# Patient Record
Sex: FEMALE | Race: WHITE | NOT HISPANIC OR LATINO | Employment: OTHER | URBAN - METROPOLITAN AREA
[De-identification: names, ages, dates, MRNs, and addresses within clinical notes are randomized per-mention and may not be internally consistent; named-entity substitution may affect disease eponyms.]

---

## 2017-01-09 RX ORDER — ROSUVASTATIN CALCIUM 20 MG/1
TABLET, COATED ORAL
Qty: 90 TABLET | Refills: 0 | Status: SHIPPED | OUTPATIENT
Start: 2017-01-09 | End: 2017-04-24

## 2017-01-14 DIAGNOSIS — Z12.39 BREAST CANCER SCREENING: ICD-10-CM

## 2017-03-20 ENCOUNTER — OFFICE VISIT (OUTPATIENT)
Dept: FAMILY MEDICINE CLINIC | Facility: CLINIC | Age: 72
End: 2017-03-20

## 2017-03-20 VITALS
DIASTOLIC BLOOD PRESSURE: 82 MMHG | OXYGEN SATURATION: 96 % | BODY MASS INDEX: 23.99 KG/M2 | HEIGHT: 65 IN | TEMPERATURE: 98.3 F | WEIGHT: 144 LBS | SYSTOLIC BLOOD PRESSURE: 138 MMHG | HEART RATE: 110 BPM

## 2017-03-20 DIAGNOSIS — N64.52 DISCHARGE FROM LEFT NIPPLE: Primary | ICD-10-CM

## 2017-03-20 PROCEDURE — 99213 OFFICE O/P EST LOW 20 MIN: CPT | Performed by: NURSE PRACTITIONER

## 2017-03-20 NOTE — PROGRESS NOTES
"Subjective   Gita Avelar is a 72 y.o. female who presents c/o blood draining from left breast x 3 days.   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 , as well as other biopsy. No breast cancer. Recent screening mammogram in January normal.   The following portions of the patient's history were reviewed and updated as appropriate: allergies, current medications, past family history, past medical history, past social history, past surgical history and problem list.    Review of Systems   Constitutional: Negative for activity change, chills, fever and unexpected weight change.   Skin:        Nipple retraction   Hematological: Negative for adenopathy.     Visit Vitals   • /82   • Pulse 110   • Temp 98.3 °F (36.8 °C) (Oral)   • Ht 65\" (165.1 cm)   • Wt 144 lb (65.3 kg)   • SpO2 96%   • BMI 23.96 kg/m2       Objective   Physical Exam   Pulmonary/Chest: She exhibits no mass and no tenderness. Left breast exhibits nipple discharge (serous). Left breast exhibits no inverted nipple, no mass, no skin change and no tenderness. Breasts are symmetrical.   Skin: Skin is warm and dry. No rash noted. No erythema. No pallor.   Psychiatric: She has a normal mood and affect. Her speech is normal and behavior is normal.     Assessment/Plan   Problems Addressed this Visit     None      Visit Diagnoses     Discharge from left nipple    -  Primary    Relevant Orders    US Breast Left Limited    Mammo diagnostic digital tomosynthesis left w CAD        Treatment pending results, consider breast surgery referral with history of papillomas.          "

## 2017-03-28 DIAGNOSIS — N64.52 DISCHARGE FROM LEFT NIPPLE: ICD-10-CM

## 2017-03-28 PROCEDURE — 76642 ULTRASOUND BREAST LIMITED: CPT | Performed by: NURSE PRACTITIONER

## 2017-03-28 PROCEDURE — 77061 BREAST TOMOSYNTHESIS UNI: CPT | Performed by: NURSE PRACTITIONER

## 2017-03-29 ENCOUNTER — TELEPHONE (OUTPATIENT)
Dept: FAMILY MEDICINE CLINIC | Facility: CLINIC | Age: 72
End: 2017-03-29

## 2017-03-29 DIAGNOSIS — D36.9 INTRADUCTAL PAPILLOMA: Primary | ICD-10-CM

## 2017-03-29 NOTE — TELEPHONE ENCOUNTER
----- Message from DARLENE Arriaga sent at 3/28/2017 11:32 AM EDT -----  Breast surgery referral for intraductal papilloma, can we set this up?

## 2017-04-04 ENCOUNTER — TELEPHONE (OUTPATIENT)
Dept: SURGERY | Facility: CLINIC | Age: 72
End: 2017-04-04

## 2017-04-04 NOTE — TELEPHONE ENCOUNTER
New patient appt to see Dr. Lieberman,   Possible bx, possible intraductal papilloma.   Scheduled 4/21/17 arrive 7;30am confirmed w/patient.     Pre-screened for possible bx-pt taking ASA and Aleve, will hold x 1 week prior to appt.   Patient taking Crestor, Vit C, Vit D3, Lexapro and Multivitamin. NKA.     lml

## 2017-04-06 ENCOUNTER — TELEPHONE (OUTPATIENT)
Dept: SURGERY | Facility: CLINIC | Age: 72
End: 2017-04-06

## 2017-04-06 NOTE — TELEPHONE ENCOUNTER
[facility], November 17, 2015: Bilateral screening mammogram.  Heterogenous a dense tissue.  Benign oval nodule upper outer right breast unchanged.  BI-RADS 2.

## 2017-04-10 RX ORDER — ESCITALOPRAM OXALATE 20 MG/1
TABLET ORAL
Qty: 90 TABLET | Refills: 0 | Status: SHIPPED | OUTPATIENT
Start: 2017-04-10 | End: 2017-04-24

## 2017-04-13 ENCOUNTER — PREP FOR SURGERY (OUTPATIENT)
Dept: SURGERY | Facility: CLINIC | Age: 72
End: 2017-04-13

## 2017-04-13 ENCOUNTER — OFFICE VISIT (OUTPATIENT)
Dept: SURGERY | Facility: CLINIC | Age: 72
End: 2017-04-13

## 2017-04-13 VITALS
DIASTOLIC BLOOD PRESSURE: 83 MMHG | SYSTOLIC BLOOD PRESSURE: 122 MMHG | TEMPERATURE: 97.9 F | HEART RATE: 80 BPM | OXYGEN SATURATION: 96 % | BODY MASS INDEX: 23.99 KG/M2 | HEIGHT: 65 IN | WEIGHT: 144 LBS

## 2017-04-13 DIAGNOSIS — Z80.0 FAMILY HISTORY OF MALIGNANT NEOPLASM OF PANCREAS: ICD-10-CM

## 2017-04-13 DIAGNOSIS — N63.0 LUMP OR MASS IN BREAST: ICD-10-CM

## 2017-04-13 DIAGNOSIS — R92.8 ABNORMAL ULTRASOUND OF BREAST: ICD-10-CM

## 2017-04-13 DIAGNOSIS — N64.52 NIPPLE DISCHARGE: Primary | ICD-10-CM

## 2017-04-13 PROCEDURE — 99203 OFFICE O/P NEW LOW 30 MIN: CPT | Performed by: SURGERY

## 2017-04-13 RX ORDER — SODIUM CHLORIDE, SODIUM LACTATE, POTASSIUM CHLORIDE, CALCIUM CHLORIDE 600; 310; 30; 20 MG/100ML; MG/100ML; MG/100ML; MG/100ML
100 INJECTION, SOLUTION INTRAVENOUS CONTINUOUS
Status: CANCELLED | OUTPATIENT
Start: 2017-04-13

## 2017-04-13 RX ORDER — CEFAZOLIN SODIUM 2 G/100ML
2 INJECTION, SOLUTION INTRAVENOUS ONCE
Status: CANCELLED | OUTPATIENT
Start: 2017-04-13 | End: 2017-04-13

## 2017-04-13 NOTE — PROGRESS NOTES
Chief Complaint: iGta Avelar is a 72 y.o. female who was seen in consultation at the request of DARLENE Arriaga  for {ARHCHIEFCOMPLAINT:21209}    History of Present Illness:  Patient presents with {ARHCHIEFCOMPLAINT:21209}. She noted no new masses, skin changes, nipple discharge, nipple changes prior to her most recent imaging.  Her most recent imaging includes the following: ***  She had a biopsy on the following day that showed: ***  She has not had a breast biopsy in the past.  She has her uterus and ovaries, is postmenopausal, and takes nor hormones.  Her family history includes the following: ***  She is here for evaluation.    Review of Systems:  Review of Systems - Oncology     Past Medical and Surgical History:  Breast Biopsy History:  {ARHPMHBREASTBIOPSY:21210}  Breast Cancer HIstory:  {ARHPMHBREAST CANCER:21211}  Breast Operations, and year:  ***  Obstetric/Gynecologic History:  Age menstrual periods began:***  {ARHPMHMENOPAUSALSTATUS:21212}   Number of pregnancies:***  Number of live births: ***  Number of abortions or miscarriages: ***  Age of delivery of first child: ***  {ARHPMHBREASTFEED:21213}  Length of time taking birth control pills:***  {ARHPMHHRT:21214}    ***  Past Surgical History:   Procedure Laterality Date   • BREAST BIOPSY     • HYSTERECTOMY         ***  Past Medical History:   Diagnosis Date   • Abnormal weight gain    • B12 deficiency    • Depression    • Diverticulitis of colon    • Elevated blood pressure    • History of Guillain-Luther syndrome    • History of subdural hematoma    • Hyperlipidemia    • Osteoporosis    • Postmenopausal atrophic vaginitis    • Vitamin D deficiency        Prior Hospitalizations, other than for surgery or childbirth, and year:  ***    ***  Social History     Social History   • Marital status:      Spouse name: N/A   • Number of children: N/A   • Years of education: N/A     Occupational History   • Not on file.     Social History Main  "Topics   • Smoking status: Current Every Day Smoker     Packs/day: 0.50     Years: 25.00     Types: Cigarettes     Start date: 1992   • Smokeless tobacco: Not on file   • Alcohol use Yes      Comment: occasionally    • Drug use: No   • Sexual activity: Not on file     Other Topics Concern   • Not on file     Social History Narrative     {ARHSOCHXMARITAL:21215}  {ARHSOCHXOCCUPATION:21217}  {ARHSOCHXCAFFEINE:21221}    ***  Family History:  Family History   Problem Relation Age of Onset   • Hypertension Mother    • Pancreatic cancer Mother    • Hypertension Father    • Deep vein thrombosis Father    • Hyperlipidemia Father    • Pancreatic cancer Sister    • Arthritis Sister    • Liver cancer Sister    • Rectal cancer Sister    • Colon cancer Sister    • Pancreatic cancer Brother    • Liver cancer Brother    • Prostate cancer Brother        Vital Signs:  /83 (BP Location: Left arm, Patient Position: Sitting, Cuff Size: Adult)  Pulse 80  Temp 97.9 °F (36.6 °C) (Temporal Artery )   Ht 65\" (165.1 cm)  Wt 144 lb (65.3 kg)  SpO2 96%  BMI 23.96 kg/m2     Medications:    Current Outpatient Prescriptions:   •  cholecalciferol (VITAMIN D3) 1000 UNITS tablet, Take 1,000 Units by mouth Daily., Disp: , Rfl:   •  escitalopram (LEXAPRO) 20 MG tablet, TAKE ONE TABLET BY MOUTH ONCE DAILY, Disp: 90 tablet, Rfl: 0  •  Multiple Vitamin (MULTIVITAMINS PO), Take 1 tablet by mouth daily., Disp: , Rfl:   •  naproxen sodium (ALEVE) 220 MG tablet, Take 220 mg by mouth every 12 (twelve) hours as needed., Disp: , Rfl:   •  Probiotic Product (PROBIOTIC DAILY PO), Take 1 tablet by mouth daily., Disp: , Rfl:   •  rosuvastatin (CRESTOR) 20 MG tablet, TAKE ONE TABLET BY MOUTH AT BEDTIME, Disp: 90 tablet, Rfl: 0  •  aspirin 81 MG tablet, Take 81 mg by mouth daily., Disp: , Rfl:      Allergies:  No Known Allergies    Physical Examination:  /83 (BP Location: Left arm, Patient Position: Sitting, Cuff Size: Adult)  Pulse 80  Temp 97.9 " "°F (36.6 °C) (Temporal Artery )   Ht 65\" (165.1 cm)  Wt 144 lb (65.3 kg)  SpO2 96%  BMI 23.96 kg/m2  General Appearance:  Patient is in no distress.  She is well kept and has an {ARHPEBUILD:72109} build.   Psychiatric:  Patient with appropriate mood and affect. Alert and oriented to self, time, and place.    Breast, RIGHT:  {ARHPEBREASTSIZE:46762} sized, symmetric with the contralateral side.  Breast skin is without erythema, edema, rashes.  There are no visible abnormalities upon inspection during the arm-raising maneuver or with hands on hips in the sitting position. There is no nipple retraction, discharge or nipple/areolar skin changes.There are no masses palpable in the sitting or supine positions.    Breast, LEFT:  {ARHPEBREASTSIZE:23438} sized, symmetric with the contralateral side.  Breast skin is without erythema, edema, rashes.  There are no visible abnormalities upon inspection during the arm-raising maneuver or with hands on hips in the sitting position. There is no nipple retraction, discharge or nipple/areolar skin changes.There are no masses palpable in the sitting or supine positions.    Lymphatic:  There is no axillary, cervical, infraclavicular, or supraclavicular adenopathy bilaterally.  Eyes:  Pupils are round and reactive to light.  Cardiovascular:  Heart rate and rhythm are regular.  Respiratory:  Lungs are clear bilaterally with no crackles or wheezes in any lung field.  Gastrointestinal:  Abdomen is soft, nondistended, and nontender. ***There are no scars from previous surgery.    Musculoskeletal:  Good strength in all 4 extremities.   {ARHPE range motion shoulder:30166}    Skin:  No new skin lesions or rashes on the skin excluding the breast (see breast exam above).        Imaging:  ***    Pathology:  ***    Procedures:      Assessment:  No diagnosis found.    Plan:  ***      Sarah Becerril MA        We have spent *** minutes in visit today, *** in face to face .      Next " Appointment:  No Follow-up on file.      EMR Dragon/transcription disclaimer:    Much of this encounter note is an electronic transcription/translocation of spoken language to printed text.  The electronic translation of spoken language may permit erroneous, or at times, nonsensical words or phrases to be inadvertently transcribed.  Although I have reviewed the note from such areas, some may still exist.

## 2017-04-13 NOTE — PROGRESS NOTES
Chief Complaint: Gita Avelar is a 72 y.o. female who was seen in consultation at the request of DARLENE Arriaga  for breast mass    History of Present Illness:  Patient presents with abnormal breast imaging and nipple discharge.     She noted in March spontaneous bloody nipple drainage from the LEFT breast on her shirt.    She noted no new masses, skin changes, other nipple changes prior to her most recent imaging.  Her most recent imaging includes the followin17 [facility] screening mammogram scattered fiber glandular densities.  BI-RADS 1.  3- [facility] diagnostic mammogram and ultrasound for new left bloody nipple discharge showed scattered fiber glandular densities.  Ultrasound showed a small apparently intraductal nodule 4 mm likely intraductal papilloma.  BI-RADS 4 surgical consultation is recommended for possible excision.    She has had 2 excisional biopsies on each breast for done in the .  She tells me that the excisions near the nipple in the past were also for her nipple drainage.  She has her ovaries, is postmenopausal, and takes no hormones.  Her family history includes the following: No daughters, 2 sisters, 5 maternal aunts, 6 paternal aunts.  No breast or ovarian cancer in her family.  She is here for evaluation.    Review of Systems:  Review of Systems   Gastrointestinal: Positive for constipation.   All other systems reviewed and are negative.       Past Medical and Surgical History:  Breast Biopsy History:  Patient has had the following breast biopsies: & , right and left breast biopsies x 4, all benign   Breast Cancer HIstory:  Patient does not have a past medical history of breast cancer.  Breast Operations, and year:  None   Obstetric/Gynecologic History:  Age menstrual periods began: 12 yrs old   Patient is postmenopausal due to removal of her uterus in the following year:    Number of pregnancies: 4  Number of live births:  3  Number of abortions or miscarriages: 1  Age of delivery of first child: 16  Patient did not breast feed.  Length of time taking birth control pills: 6 yrs   Patient has never taken hormone replacement  Patient has uterus removed in 1972, still has both ovaries.     Past Surgical History:   Procedure Laterality Date   • BREAST BIOPSY     • HYSTERECTOMY         Past Medical History:   Diagnosis Date   • Abnormal weight gain    • B12 deficiency    • Depression    • Diverticulitis of colon    • Elevated blood pressure    • History of Guillain-Durham syndrome    • History of subdural hematoma    • Hyperlipidemia    • Osteoporosis    • Postmenopausal atrophic vaginitis    • Vitamin D deficiency        Prior Hospitalizations, other than for surgery or childbirth, and year:  Diverticulitis & Subdural hematoma    Social History     Social History   • Marital status:      Spouse name: N/A   • Number of children: N/A   • Years of education: N/A     Occupational History   • Not on file.     Social History Main Topics   • Smoking status: Current Every Day Smoker     Packs/day: 0.50     Years: 25.00     Types: Cigarettes     Start date: 1992   • Smokeless tobacco: Not on file   • Alcohol use Yes      Comment: occasionally    • Drug use: No   • Sexual activity: Not on file     Other Topics Concern   • Not on file     Social History Narrative     Patient is .  Patient is retired.  Patient drinks 4 servings of caffeine per day.    Family History:  Family History   Problem Relation Age of Onset   • Hypertension Mother    • Pancreatic cancer Mother    • Hypertension Father    • Deep vein thrombosis Father    • Hyperlipidemia Father    • Pancreatic cancer Sister    • Arthritis Sister    • Liver cancer Sister    • Rectal cancer Sister    • Colon cancer Sister    • Pancreatic cancer Brother    • Liver cancer Brother    • Prostate cancer Brother        Vital Signs:  /83 (BP Location: Left arm, Patient Position:  "Sitting, Cuff Size: Adult)  Pulse 80  Temp 97.9 °F (36.6 °C) (Temporal Artery )   Ht 65\" (165.1 cm)  Wt 144 lb (65.3 kg)  SpO2 96%  BMI 23.96 kg/m2     Medications:    Current Outpatient Prescriptions:   •  cholecalciferol (VITAMIN D3) 1000 UNITS tablet, Take 1,000 Units by mouth Daily., Disp: , Rfl:   •  escitalopram (LEXAPRO) 20 MG tablet, TAKE ONE TABLET BY MOUTH ONCE DAILY, Disp: 90 tablet, Rfl: 0  •  Multiple Vitamin (MULTIVITAMINS PO), Take 1 tablet by mouth daily., Disp: , Rfl:   •  naproxen sodium (ALEVE) 220 MG tablet, Take 220 mg by mouth every 12 (twelve) hours as needed., Disp: , Rfl:   •  Probiotic Product (PROBIOTIC DAILY PO), Take 1 tablet by mouth daily., Disp: , Rfl:   •  rosuvastatin (CRESTOR) 20 MG tablet, TAKE ONE TABLET BY MOUTH AT BEDTIME, Disp: 90 tablet, Rfl: 0  •  aspirin 81 MG tablet, Take 81 mg by mouth daily., Disp: , Rfl:      Allergies:  No Known Allergies    Physical Examination:  /83 (BP Location: Left arm, Patient Position: Sitting, Cuff Size: Adult)  Pulse 80  Temp 97.9 °F (36.6 °C) (Temporal Artery )   Ht 65\" (165.1 cm)  Wt 144 lb (65.3 kg)  SpO2 96%  BMI 23.96 kg/m2  General Appearance:  Patient is in no distress.  She is well kept and has an average build.   Psychiatric:  Patient with appropriate mood and affect. Alert and oriented to self, time, and place.    Breast, RIGHT:  medium sized, symmetric with the contralateral side. There are well healed periareolar and upper outer quadrant lateral incisions on each breast from excisional biopsies in the 1970s.     Breast skin is without erythema, edema, rashes.  There are no visible abnormalities upon inspection during the arm-raising maneuver or with hands on hips in the sitting position. There is no nipple retraction, discharge or nipple/areolar skin changes.There are no  masses palpable in the sitting or supine positions.    Breast, LEFT:  medium sized, symmetric with the contralateral side.  There are well " healed periareolar and upper outer quadrant lateral incisions on each breast from excisional biopsies in the 1970s.  There is a palpable approximately 1.5 centimeter mass immediately adjacent to the skin at the left breast 9:30 to 10:00 retroareolar location just outside of the nipple papule.  There is clear nipple drainage from a single duct with much effort.  The patient is able to express this relatively easily by squeezing the nipple alone.  Breast skin is without erythema, edema, rashes.  There are no visible abnormalities upon inspection during the arm-raising maneuver or with hands on hips in the sitting position. There is no nipple retraction, or nipple/areolar skin changes.There are no other  masses palpable in the sitting or supine positions.    Lymphatic:  There is no axillary, cervical, infraclavicular, or supraclavicular adenopathy bilaterally.  Eyes:  Pupils are round and reactive to light.  Cardiovascular:  Heart rate and rhythm are regular.  Respiratory:  Lungs are clear bilaterally with no crackles or wheezes in any lung field.  Gastrointestinal:  Abdomen is soft, nondistended, and nontender.     Musculoskeletal:  Good strength in all 4 extremities.   There is good range of motion in both shoulders.    Skin:  No new skin lesions or rashes on the skin excluding the breast (see breast exam above).        Imagin/13/17 [facility] BILATERAL SCREENING MAMMO [facility]ON SUNG  Scattered fibroglandular tissues.  BIRADS: 1 Negative    3/27/17     [facility] LEFT MAMMO  [facility]  New left bloody nipple discharge.  Scattered fibroglandular densities.  Ultrasound subareolar right breast. Small apparent intraductal nodule measuring about 4 mm. Likely an intraductal papilloma.  BIRADS: 4    Diagnostic Ultrasound Breast, Targetted    Procedure: Diagnostic ultrasound LEFT breast.  Indication: preoperative identification for targetting  Description of procedure: Using a 10MHz  linear transducer, I scanned the breast at the area of interest.  Findings: At the area of palpable finding, LEFT breast 9:30-10:00 RA location, there is a hypoechoic mass measuring in the ARAD dimension 7x3.5mm and in the RAD dimension 1.75 x 0.5cm. The lesion is intimately involved with the undersurface of the skin.  Impression: Solid lesion possible papilloma, in very close proximity to the skin.    Plan:  excision, in operating room, as below    Procedures:      Assessment:   Diagnosis Plan   1. Nipple discharge  Case Request    lactated ringers infusion    ceFAZolin (ANCEF) 2 g in sodium chloride 0.9 % 100 mL IVPB    Case Request   2. Abnormal ultrasound of breast  Case Request    lactated ringers infusion    ceFAZolin (ANCEF) 2 g in sodium chloride 0.9 % 100 mL IVPB    Case Request   3. Lump or mass in breast  Case Request    lactated ringers infusion    ceFAZolin (ANCEF) 2 g in sodium chloride 0.9 % 100 mL IVPB    Case Request   4. Family history of malignant neoplasm of pancreas  Case Request    lactated ringers infusion    ceFAZolin (ANCEF) 2 g in sodium chloride 0.9 % 100 mL IVPB    Case Request   1-3  LEFT breast 9:30-10:00 retroareolar location, just outside of the nipple papule. 1.5 cm on exam and immediately beneath the skin surface. Single duct clear heme negative drainage since March 2017. On US a 4mm nodule, 7x17mm on my office US    4-   mother 80, sister 68, brother 72      Plan:  Mrs. Avelar and I discussed that for BI-RADS 4 lesions that we typically prefer to do an ultrasound-guided biopsy in the office.  However due to the proximity of the lesion to the skin and then need to remove the entirety of the lesion with the suspicion that this is a papilloma, I have recommended that we do a left breast excisional biopsy using a lacrimal duct probe and the intraoperative ultrasound.    I will plan to do a radial incision including the ellipse of skin overlying the palpable lesion and also the probe to  ensure we remove the cannulated duct.    We discussed the risks including bleeding, infection, alterations in nipple sensation.  She wished to proceed.    We will see her back for surgery.  Her next bilateral screening mammogram will be due January 14, 2018.    Note her family history of pancreas cancer.  We will discuss her possibly talking with a genetic counselor at her postoperative visit.      Mallory Lieberman MD        .      Next Appointment:  Return for surgery.      EMR Dragon/transcription disclaimer:    Much of this encounter note is an electronic transcription/translocation of spoken language to printed text.  The electronic translation of spoken language may permit erroneous, or at times, nonsensical words or phrases to be inadvertently transcribed.  Although I have reviewed the note from such areas, some may still exist.

## 2017-04-14 ENCOUNTER — TELEPHONE (OUTPATIENT)
Dept: SURGERY | Facility: CLINIC | Age: 72
End: 2017-04-14

## 2017-04-14 NOTE — TELEPHONE ENCOUNTER
[place] [facility] [place]    Left Breast Excisional Biopsy, using Lacrimal Duct Probe    Arrive      @ 5:45  Surgery  @ 7:00am    PAT  4-19-17 @ 11:30  Return to see Dr PATTERSON 5-10-17 @ arrive 12:45    Pt is aware of appointments  drew

## 2017-04-14 NOTE — TELEPHONE ENCOUNTER
Ms. Avelar rescheduled her surgery to    5-4-17 [facility]   Left Breast Excisional Biopsy Using Lacrimal Duct Probe (Scheduled with Garry)    Arrive       @ 6:00  Surgery   @ 8:00    PAT 4-24-17 @ 8:00  Return to see Dr PATTERSON 5-16-17 @ arrive 12:45    kdl    (this is an 8:00 start day for OR)

## 2017-04-24 ENCOUNTER — APPOINTMENT (OUTPATIENT)
Dept: PREADMISSION TESTING | Facility: HOSPITAL | Age: 72
End: 2017-04-24

## 2017-04-24 VITALS
DIASTOLIC BLOOD PRESSURE: 90 MMHG | TEMPERATURE: 98.2 F | SYSTOLIC BLOOD PRESSURE: 124 MMHG | WEIGHT: 145.5 LBS | HEART RATE: 82 BPM | HEIGHT: 64 IN | BODY MASS INDEX: 24.84 KG/M2 | OXYGEN SATURATION: 96 % | RESPIRATION RATE: 16 BRPM

## 2017-04-24 LAB
DEPRECATED RDW RBC AUTO: 45.1 FL (ref 37–54)
ERYTHROCYTE [DISTWIDTH] IN BLOOD BY AUTOMATED COUNT: 13 % (ref 11.7–13)
HCT VFR BLD AUTO: 43.1 % (ref 35.6–45.5)
HGB BLD-MCNC: 14.1 G/DL (ref 11.9–15.5)
MCH RBC QN AUTO: 31 PG (ref 26.9–32)
MCHC RBC AUTO-ENTMCNC: 32.7 G/DL (ref 32.4–36.3)
MCV RBC AUTO: 94.7 FL (ref 80.5–98.2)
PLATELET # BLD AUTO: 169 10*3/MM3 (ref 140–500)
PMV BLD AUTO: 10.1 FL (ref 6–12)
RBC # BLD AUTO: 4.55 10*6/MM3 (ref 3.9–5.2)
WBC NRBC COR # BLD: 6.16 10*3/MM3 (ref 4.5–10.7)

## 2017-04-24 PROCEDURE — 36415 COLL VENOUS BLD VENIPUNCTURE: CPT

## 2017-04-24 PROCEDURE — 93005 ELECTROCARDIOGRAM TRACING: CPT

## 2017-04-24 PROCEDURE — 93010 ELECTROCARDIOGRAM REPORT: CPT | Performed by: INTERNAL MEDICINE

## 2017-04-24 PROCEDURE — 85027 COMPLETE CBC AUTOMATED: CPT

## 2017-04-24 RX ORDER — ROSUVASTATIN CALCIUM 20 MG/1
20 TABLET, COATED ORAL DAILY
COMMUNITY
End: 2018-12-03 | Stop reason: HOSPADM

## 2017-04-24 RX ORDER — ESCITALOPRAM OXALATE 20 MG/1
20 TABLET ORAL DAILY
COMMUNITY
End: 2018-07-19 | Stop reason: ALTCHOICE

## 2017-04-24 NOTE — DISCHARGE INSTRUCTIONS
ARRIVAL TIME 05:45        General Instructions:  • Do not eat solid food after midnight the night before surgery.  • You may drink clear liquids day of surgery but must stop at least one hour before your hospital arrival time.  • It is beneficial for you to have a clear drink that contains carbohydrates the day of surgery.  We suggest a 20 ounce bottle of Gatorade or Powerade for non-diabetic patients or a 20 ounce bottle of G2 or Powerade Zero for diabetic patients. (Pediatric patients, are not advised to drink a 20 ounce carbohydrate drink)    Clear liquids are liquids you can see through.  Nothing red in color.     Plain water                               Sports drinks  Sodas                                   Gelatin (Jell-O)  Fruit juices without pulp such as white grape juice and apple juice  Popsicles that contain no fruit or yogurt  Tea or coffee (no cream or milk added)  Gatorade / Powerade  G2 / Powerade Zero    • Infants may have breast milk up to four hours before surgery.  • Infants drinking formula may drink formula up to six hours before surgery.   • Patients who avoid smoking, chewing tobacco and alcohol for 4 weeks prior to surgery have a reduced risk of post-operative complications.  Quit smoking as many days before surgery as you can.  • Do not smoke, use chewing tobacco or drink alcohol the day of surgery.   • If applicable bring your C-PAP/ BI-PAP machine.  • Bring any papers given to you in the doctor’s office.  • Wear clean comfortable clothes and socks.  • Do not wear contact lenses or make-up.  Bring a case for your glasses.   • Bring crutches or walker if applicable.  • Leave all other valuables and jewelry at home.  • The Pre-Admission Testing nurse will instruct you to bring medications if unable to obtain an accurate list in Pre-Admission Testing.            Preventing a Surgical Site Infection:  • For 2 to 3 days before surgery, avoid shaving with a razor because the razor can irritate  skin and make it easier to develop an infection.  • The night prior to surgery sleep in a clean bed with clean clothing.  Do not allow pets to sleep with you.  • Shower on the morning of surgery using a fresh bar of anti-bacterial soap (such as Dial) and clean washcloth.  Dry with a clean towel and dress in clean clothing.  • Ask your surgeon if you will be receiving antibiotics prior to surgery.  • Make sure you, your family, and all healthcare providers clean their hands with soap and water or an alcohol based hand  before caring for you or your wound.    Day of surgery:  Upon arrival, a Pre-op nurse and Anesthesiologist will review your health history, obtain vital signs, and answer questions you may have.  The only belongings needed at this time will be your home medications and if applicable your C-PAP/BI-PAP machine.  If you are staying overnight your family can leave the rest of your belongings in the car and bring them to your room later.  A Pre-op nurse will start an IV and you may receive medication in preparation for surgery, including something to help you relax.  Your family will be able to see you in the Pre-op area.  While you are in surgery your family should notify the waiting room  if they leave the waiting room area and provide a contact phone number.    Please be aware that surgery does come with discomfort.  We want to make every effort to control your discomfort so please discuss any uncontrolled symptoms with your nurse.   Your doctor will most likely have prescribed pain medications.      If you are going home after surgery you will receive individualized written care instructions before being discharged.  A responsible adult must drive you to and from the hospital on the day of your surgery and stay with you for 24 hours.    If you are staying overnight following surgery, you will be transported to your hospital room following the recovery period.  [facility] has all private rooms.    If you have any questions please call Pre-Admission Testing at [phone].  Deductibles and co-payments are collected on the day of service. Please be prepared to pay the required co-pay, deductible or deposit on the day of service as defined by your plan.

## 2017-04-25 ENCOUNTER — TELEPHONE (OUTPATIENT)
Dept: SURGERY | Facility: CLINIC | Age: 72
End: 2017-04-25

## 2017-05-04 ENCOUNTER — ANESTHESIA (OUTPATIENT)
Dept: PERIOP | Facility: HOSPITAL | Age: 72
End: 2017-05-04

## 2017-05-04 ENCOUNTER — HOSPITAL ENCOUNTER (OUTPATIENT)
Facility: HOSPITAL | Age: 72
Setting detail: HOSPITAL OUTPATIENT SURGERY
Discharge: HOME | End: 2017-05-04
Attending: SURGERY | Admitting: SURGERY

## 2017-05-04 ENCOUNTER — ANESTHESIA EVENT (OUTPATIENT)
Dept: PERIOP | Facility: HOSPITAL | Age: 72
End: 2017-05-04

## 2017-05-04 VITALS
TEMPERATURE: 98.2 F | DIASTOLIC BLOOD PRESSURE: 96 MMHG | SYSTOLIC BLOOD PRESSURE: 174 MMHG | OXYGEN SATURATION: 96 % | RESPIRATION RATE: 16 BRPM | HEART RATE: 105 BPM

## 2017-05-04 DIAGNOSIS — N63.0 LUMP OR MASS IN BREAST: ICD-10-CM

## 2017-05-04 DIAGNOSIS — N64.52 NIPPLE DISCHARGE: ICD-10-CM

## 2017-05-04 DIAGNOSIS — R92.8 ABNORMAL ULTRASOUND OF BREAST: ICD-10-CM

## 2017-05-04 DIAGNOSIS — Z80.0 FAMILY HISTORY OF MALIGNANT NEOPLASM OF PANCREAS: ICD-10-CM

## 2017-05-04 PROCEDURE — 88307 TISSUE EXAM BY PATHOLOGIST: CPT | Performed by: SURGERY

## 2017-05-04 PROCEDURE — 76998 US GUIDE INTRAOP: CPT | Performed by: SURGERY

## 2017-05-04 PROCEDURE — 25010000002 MIDAZOLAM PER 1 MG: Performed by: ANESTHESIOLOGY

## 2017-05-04 PROCEDURE — 25010000002 PROPOFOL 10 MG/ML EMULSION: Performed by: NURSE ANESTHETIST, CERTIFIED REGISTERED

## 2017-05-04 PROCEDURE — 25010000003 CEFAZOLIN IN DEXTROSE 2-4 GM/100ML-% SOLUTION: Performed by: SURGERY

## 2017-05-04 PROCEDURE — 25010000002 FENTANYL CITRATE (PF) 100 MCG/2ML SOLUTION: Performed by: NURSE ANESTHETIST, CERTIFIED REGISTERED

## 2017-05-04 PROCEDURE — 19120 REMOVAL OF BREAST LESION: CPT | Performed by: SURGERY

## 2017-05-04 RX ORDER — PROPOFOL 10 MG/ML
VIAL (ML) INTRAVENOUS AS NEEDED
Status: DISCONTINUED | OUTPATIENT
Start: 2017-05-04 | End: 2017-05-04 | Stop reason: SURG

## 2017-05-04 RX ORDER — LIDOCAINE HYDROCHLORIDE 20 MG/ML
INJECTION, SOLUTION INFILTRATION; PERINEURAL AS NEEDED
Status: DISCONTINUED | OUTPATIENT
Start: 2017-05-04 | End: 2017-05-04 | Stop reason: SURG

## 2017-05-04 RX ORDER — PROMETHAZINE HYDROCHLORIDE 25 MG/ML
12.5 INJECTION, SOLUTION INTRAMUSCULAR; INTRAVENOUS ONCE AS NEEDED
Status: DISCONTINUED | OUTPATIENT
Start: 2017-05-04 | End: 2017-05-04 | Stop reason: HOSPADM

## 2017-05-04 RX ORDER — HYDROCODONE BITARTRATE AND ACETAMINOPHEN 7.5; 325 MG/1; MG/1
1 TABLET ORAL ONCE AS NEEDED
Status: COMPLETED | OUTPATIENT
Start: 2017-05-04 | End: 2017-05-04

## 2017-05-04 RX ORDER — MIDAZOLAM HYDROCHLORIDE 1 MG/ML
1 INJECTION INTRAMUSCULAR; INTRAVENOUS
Status: DISCONTINUED | OUTPATIENT
Start: 2017-05-04 | End: 2017-05-04 | Stop reason: HOSPADM

## 2017-05-04 RX ORDER — DIPHENHYDRAMINE HYDROCHLORIDE 50 MG/ML
12.5 INJECTION INTRAMUSCULAR; INTRAVENOUS
Status: DISCONTINUED | OUTPATIENT
Start: 2017-05-04 | End: 2017-05-04 | Stop reason: HOSPADM

## 2017-05-04 RX ORDER — ONDANSETRON 2 MG/ML
4 INJECTION INTRAMUSCULAR; INTRAVENOUS ONCE AS NEEDED
Status: DISCONTINUED | OUTPATIENT
Start: 2017-05-04 | End: 2017-05-04 | Stop reason: HOSPADM

## 2017-05-04 RX ORDER — HYDRALAZINE HYDROCHLORIDE 20 MG/ML
5 INJECTION INTRAMUSCULAR; INTRAVENOUS
Status: DISCONTINUED | OUTPATIENT
Start: 2017-05-04 | End: 2017-05-04 | Stop reason: HOSPADM

## 2017-05-04 RX ORDER — FAMOTIDINE 10 MG/ML
20 INJECTION, SOLUTION INTRAVENOUS ONCE
Status: COMPLETED | OUTPATIENT
Start: 2017-05-04 | End: 2017-05-04

## 2017-05-04 RX ORDER — LABETALOL HYDROCHLORIDE 5 MG/ML
5 INJECTION, SOLUTION INTRAVENOUS
Status: DISCONTINUED | OUTPATIENT
Start: 2017-05-04 | End: 2017-05-04 | Stop reason: HOSPADM

## 2017-05-04 RX ORDER — MIDAZOLAM HYDROCHLORIDE 1 MG/ML
2 INJECTION INTRAMUSCULAR; INTRAVENOUS
Status: DISCONTINUED | OUTPATIENT
Start: 2017-05-04 | End: 2017-05-04 | Stop reason: HOSPADM

## 2017-05-04 RX ORDER — SODIUM CHLORIDE, SODIUM LACTATE, POTASSIUM CHLORIDE, CALCIUM CHLORIDE 600; 310; 30; 20 MG/100ML; MG/100ML; MG/100ML; MG/100ML
9 INJECTION, SOLUTION INTRAVENOUS CONTINUOUS
Status: DISCONTINUED | OUTPATIENT
Start: 2017-05-04 | End: 2017-05-04 | Stop reason: HOSPADM

## 2017-05-04 RX ORDER — PROMETHAZINE HYDROCHLORIDE 25 MG/1
25 TABLET ORAL ONCE AS NEEDED
Status: DISCONTINUED | OUTPATIENT
Start: 2017-05-04 | End: 2017-05-04 | Stop reason: HOSPADM

## 2017-05-04 RX ORDER — OXYCODONE AND ACETAMINOPHEN 7.5; 325 MG/1; MG/1
1 TABLET ORAL ONCE AS NEEDED
Status: DISCONTINUED | OUTPATIENT
Start: 2017-05-04 | End: 2017-05-04 | Stop reason: HOSPADM

## 2017-05-04 RX ORDER — NALOXONE HCL 0.4 MG/ML
0.2 VIAL (ML) INJECTION AS NEEDED
Status: DISCONTINUED | OUTPATIENT
Start: 2017-05-04 | End: 2017-05-04 | Stop reason: HOSPADM

## 2017-05-04 RX ORDER — PROMETHAZINE HYDROCHLORIDE 25 MG/1
12.5 TABLET ORAL ONCE AS NEEDED
Status: DISCONTINUED | OUTPATIENT
Start: 2017-05-04 | End: 2017-05-04 | Stop reason: HOSPADM

## 2017-05-04 RX ORDER — SODIUM CHLORIDE, SODIUM LACTATE, POTASSIUM CHLORIDE, CALCIUM CHLORIDE 600; 310; 30; 20 MG/100ML; MG/100ML; MG/100ML; MG/100ML
100 INJECTION, SOLUTION INTRAVENOUS CONTINUOUS
Status: DISCONTINUED | OUTPATIENT
Start: 2017-05-04 | End: 2017-05-04 | Stop reason: HOSPADM

## 2017-05-04 RX ORDER — FLUMAZENIL 0.1 MG/ML
0.2 INJECTION INTRAVENOUS AS NEEDED
Status: DISCONTINUED | OUTPATIENT
Start: 2017-05-04 | End: 2017-05-04 | Stop reason: HOSPADM

## 2017-05-04 RX ORDER — FENTANYL CITRATE 50 UG/ML
50 INJECTION, SOLUTION INTRAMUSCULAR; INTRAVENOUS
Status: DISCONTINUED | OUTPATIENT
Start: 2017-05-04 | End: 2017-05-04 | Stop reason: HOSPADM

## 2017-05-04 RX ORDER — HYDROMORPHONE HYDROCHLORIDE 1 MG/ML
0.5 INJECTION, SOLUTION INTRAMUSCULAR; INTRAVENOUS; SUBCUTANEOUS
Status: DISCONTINUED | OUTPATIENT
Start: 2017-05-04 | End: 2017-05-04 | Stop reason: HOSPADM

## 2017-05-04 RX ORDER — PROMETHAZINE HYDROCHLORIDE 25 MG/1
25 SUPPOSITORY RECTAL ONCE AS NEEDED
Status: DISCONTINUED | OUTPATIENT
Start: 2017-05-04 | End: 2017-05-04 | Stop reason: HOSPADM

## 2017-05-04 RX ORDER — FENTANYL CITRATE 50 UG/ML
INJECTION, SOLUTION INTRAMUSCULAR; INTRAVENOUS AS NEEDED
Status: DISCONTINUED | OUTPATIENT
Start: 2017-05-04 | End: 2017-05-04 | Stop reason: SURG

## 2017-05-04 RX ORDER — CEFAZOLIN SODIUM 2 G/100ML
2 INJECTION, SOLUTION INTRAVENOUS ONCE
Status: COMPLETED | OUTPATIENT
Start: 2017-05-04 | End: 2017-05-04

## 2017-05-04 RX ORDER — SODIUM CHLORIDE 0.9 % (FLUSH) 0.9 %
1-10 SYRINGE (ML) INJECTION AS NEEDED
Status: DISCONTINUED | OUTPATIENT
Start: 2017-05-04 | End: 2017-05-04 | Stop reason: HOSPADM

## 2017-05-04 RX ADMIN — FENTANYL CITRATE 50 MCG: 50 INJECTION INTRAMUSCULAR; INTRAVENOUS at 08:00

## 2017-05-04 RX ADMIN — FAMOTIDINE 20 MG: 10 INJECTION, SOLUTION INTRAVENOUS at 07:16

## 2017-05-04 RX ADMIN — PROPOFOL 200 MG: 10 INJECTION, EMULSION INTRAVENOUS at 08:00

## 2017-05-04 RX ADMIN — HYDROCODONE BITARTRATE AND ACETAMINOPHEN 1 TABLET: 7.5; 325 TABLET ORAL at 09:11

## 2017-05-04 RX ADMIN — LIDOCAINE HYDROCHLORIDE 100 MG: 20 INJECTION, SOLUTION INFILTRATION; PERINEURAL at 08:00

## 2017-05-04 RX ADMIN — SODIUM CHLORIDE, POTASSIUM CHLORIDE, SODIUM LACTATE AND CALCIUM CHLORIDE 100 ML/HR: 600; 310; 30; 20 INJECTION, SOLUTION INTRAVENOUS at 07:16

## 2017-05-04 RX ADMIN — EPHEDRINE SULFATE 5 MG: 50 INJECTION INTRAMUSCULAR; INTRAVENOUS; SUBCUTANEOUS at 08:05

## 2017-05-04 RX ADMIN — CEFAZOLIN SODIUM 2 G: 2 INJECTION, SOLUTION INTRAVENOUS at 07:59

## 2017-05-04 RX ADMIN — MIDAZOLAM 2 MG: 1 INJECTION INTRAMUSCULAR; INTRAVENOUS at 07:16

## 2017-05-05 LAB
CYTO UR: NORMAL
LAB AP CASE REPORT: NORMAL
LAB AP CLINICAL INFORMATION: NORMAL
Lab: NORMAL
PATH REPORT.FINAL DX SPEC: NORMAL
PATH REPORT.GROSS SPEC: NORMAL

## 2017-05-08 ENCOUNTER — TELEPHONE (OUTPATIENT)
Dept: SURGERY | Facility: CLINIC | Age: 72
End: 2017-05-08

## 2017-05-16 ENCOUNTER — OFFICE VISIT (OUTPATIENT)
Dept: SURGERY | Facility: CLINIC | Age: 72
End: 2017-05-16

## 2017-05-16 VITALS
HEIGHT: 65 IN | TEMPERATURE: 98 F | OXYGEN SATURATION: 97 % | WEIGHT: 142.8 LBS | DIASTOLIC BLOOD PRESSURE: 78 MMHG | SYSTOLIC BLOOD PRESSURE: 138 MMHG | BODY MASS INDEX: 23.79 KG/M2 | HEART RATE: 92 BPM

## 2017-05-16 DIAGNOSIS — D24.2 PAPILLOMA OF BREAST, LEFT: Primary | ICD-10-CM

## 2017-05-16 DIAGNOSIS — Z12.31 ENCOUNTER FOR SCREENING MAMMOGRAM FOR MALIGNANT NEOPLASM OF BREAST: ICD-10-CM

## 2017-05-16 DIAGNOSIS — D24.2 PAPILLOMA OF LEFT BREAST: ICD-10-CM

## 2017-05-16 DIAGNOSIS — Z80.0 FAMILY HISTORY OF MALIGNANT NEOPLASM OF PANCREAS: ICD-10-CM

## 2017-05-16 PROCEDURE — 99024 POSTOP FOLLOW-UP VISIT: CPT | Performed by: SURGERY

## 2017-05-16 RX ORDER — HYDROCODONE BITARTRATE AND ACETAMINOPHEN 5; 325 MG/1; MG/1
1 TABLET ORAL EVERY 4 HOURS PRN
COMMUNITY
Start: 2017-05-04 | End: 2018-04-12

## 2017-05-16 RX ORDER — ONDANSETRON 4 MG/1
4 TABLET, FILM COATED ORAL EVERY 8 HOURS PRN
COMMUNITY
Start: 2017-05-04 | End: 2018-04-12 | Stop reason: HOSPADM

## 2017-06-12 RX ORDER — ROSUVASTATIN CALCIUM 20 MG/1
TABLET, COATED ORAL
Qty: 90 TABLET | Refills: 0 | Status: SHIPPED | OUTPATIENT
Start: 2017-06-12 | End: 2017-12-10 | Stop reason: SDUPTHER

## 2017-06-22 ENCOUNTER — OFFICE VISIT (OUTPATIENT)
Dept: FAMILY MEDICINE CLINIC | Facility: CLINIC | Age: 72
End: 2017-06-22

## 2017-06-22 VITALS
HEIGHT: 65 IN | BODY MASS INDEX: 23.82 KG/M2 | OXYGEN SATURATION: 94 % | WEIGHT: 143 LBS | HEART RATE: 80 BPM | TEMPERATURE: 97.9 F | DIASTOLIC BLOOD PRESSURE: 68 MMHG | SYSTOLIC BLOOD PRESSURE: 114 MMHG

## 2017-06-22 DIAGNOSIS — R10.9 ABDOMINAL PAIN, UNSPECIFIED LOCATION: ICD-10-CM

## 2017-06-22 DIAGNOSIS — K57.32 DIVERTICULITIS OF LARGE INTESTINE WITHOUT PERFORATION OR ABSCESS WITHOUT BLEEDING: Primary | ICD-10-CM

## 2017-06-22 PROCEDURE — 85027 COMPLETE CBC AUTOMATED: CPT | Performed by: NURSE PRACTITIONER

## 2017-06-22 PROCEDURE — 99213 OFFICE O/P EST LOW 20 MIN: CPT | Performed by: NURSE PRACTITIONER

## 2017-06-22 RX ORDER — METRONIDAZOLE 500 MG/1
500 TABLET ORAL 3 TIMES DAILY
Qty: 30 TABLET | Refills: 0 | Status: SHIPPED | OUTPATIENT
Start: 2017-06-22 | End: 2018-08-06

## 2017-06-22 RX ORDER — LEVOFLOXACIN 500 MG/1
500 TABLET, FILM COATED ORAL DAILY
Qty: 5 TABLET | Refills: 0 | Status: SHIPPED | OUTPATIENT
Start: 2017-06-22 | End: 2017-11-27

## 2017-06-22 NOTE — PROGRESS NOTES
"Subjective   Gita Avelar is a 72 y.o. female. Here today for complaint of abdominal pain. Onset about 2 days ago with some nausea. No vomiting or diarrhea. She thinks she may have eaten some lettuce in a taco from [employer] and didn't tolerate it well. She has had no fever, or vomiting but some nausea. The pain is located in the lower abdomen on both sides. Its a 3/10 most of the time. It's aggravated by movement. She had a normal BM on Monday but none since but has not eaten much either.     Abdominal Pain   This is a new problem. The current episode started in the past 7 days. The onset quality is sudden. The problem occurs constantly. The pain is located in the RLQ and LUQ. The pain is at a severity of 3/10. Associated symptoms include nausea. Pertinent negatives include no constipation, diarrhea or vomiting. The pain is aggravated by movement.        The following portions of the patient's history were reviewed and updated as appropriate: allergies, current medications, past medical history, past social history, past surgical history and problem list.    Review of Systems   Constitutional: Negative.    Respiratory: Negative.    Cardiovascular: Negative.    Gastrointestinal: Positive for abdominal pain and nausea. Negative for constipation, diarrhea and vomiting.       Objective   Physical Exam   Constitutional: Vital signs are normal. She appears well-developed and well-nourished. She is cooperative.   Cardiovascular: Normal rate, regular rhythm and normal heart sounds.    Pulmonary/Chest: Effort normal and breath sounds normal.   Abdominal: Soft. Bowel sounds are normal. There is tenderness.       Neurological: She is alert.   Nursing note and vitals reviewed.    Vitals:    06/22/17 0958   BP: 114/68   Pulse: 80   Temp: 97.9 °F (36.6 °C)   TempSrc: Oral   SpO2: 94%   Weight: 143 lb (64.9 kg)   Height: 65\" (165.1 cm)     CBC with WBC 6.5, h/h 14/44, plt 191    Assessment/Plan   Diagnoses and all orders for " this visit:    Abdominal pain, unspecified location  -     POC CBC    Diverticulitis of large intestine without perforation or abscess without bleeding  -     metroNIDAZOLE (FLAGYL) 500 MG tablet; Take 1 tablet by mouth 3 (Three) Times a Day.  -     levoFLOXacin (LEVAQUIN) 500 MG tablet; Take 1 tablet by mouth Daily.      Continue fluids.  Medication as directed  Advised ED if not improving, starts running fever, no BM, persistent n/v.

## 2017-07-28 ENCOUNTER — TELEPHONE (OUTPATIENT)
Dept: SURGERY | Facility: CLINIC | Age: 72
End: 2017-07-28

## 2017-07-28 NOTE — TELEPHONE ENCOUNTER
Left msg to inform pt of appts:  [facility] 1-15-18 10:00 Mammo  Dr. Lieberman 1-22-18 11:45 arrival

## 2017-08-22 RX ORDER — ESCITALOPRAM OXALATE 20 MG/1
TABLET ORAL
Qty: 90 TABLET | Refills: 0 | Status: SHIPPED | OUTPATIENT
Start: 2017-08-22 | End: 2018-02-09 | Stop reason: SDUPTHER

## 2017-11-27 ENCOUNTER — OFFICE VISIT (OUTPATIENT)
Dept: FAMILY MEDICINE CLINIC | Facility: CLINIC | Age: 72
End: 2017-11-27

## 2017-11-27 VITALS
HEIGHT: 65 IN | HEART RATE: 90 BPM | BODY MASS INDEX: 24.49 KG/M2 | WEIGHT: 147 LBS | TEMPERATURE: 98 F | DIASTOLIC BLOOD PRESSURE: 76 MMHG | SYSTOLIC BLOOD PRESSURE: 136 MMHG | OXYGEN SATURATION: 93 %

## 2017-11-27 DIAGNOSIS — N39.0 URINARY TRACT INFECTION WITHOUT HEMATURIA, SITE UNSPECIFIED: ICD-10-CM

## 2017-11-27 DIAGNOSIS — R06.2 WHEEZING: Primary | ICD-10-CM

## 2017-11-27 DIAGNOSIS — R30.0 DYSURIA: ICD-10-CM

## 2017-11-27 DIAGNOSIS — J40 WHEEZY BRONCHITIS: ICD-10-CM

## 2017-11-27 LAB
BILIRUB BLD-MCNC: NEGATIVE MG/DL
CLARITY, POC: CLEAR
COLOR UR: YELLOW
GLUCOSE UR STRIP-MCNC: NEGATIVE MG/DL
KETONES UR QL: NEGATIVE
LEUKOCYTE EST, POC: ABNORMAL
NITRITE UR-MCNC: NEGATIVE MG/ML
PH UR: 6.5 [PH] (ref 5–8)
PROT UR STRIP-MCNC: ABNORMAL MG/DL
RBC # UR STRIP: ABNORMAL /UL
SP GR UR: 1.02 (ref 1–1.03)
UROBILINOGEN UR QL: NORMAL

## 2017-11-27 PROCEDURE — 71020 XR CHEST PA AND LATERAL: CPT | Performed by: NURSE PRACTITIONER

## 2017-11-27 PROCEDURE — 94640 AIRWAY INHALATION TREATMENT: CPT | Performed by: NURSE PRACTITIONER

## 2017-11-27 PROCEDURE — 99213 OFFICE O/P EST LOW 20 MIN: CPT | Performed by: NURSE PRACTITIONER

## 2017-11-27 PROCEDURE — 81003 URINALYSIS AUTO W/O SCOPE: CPT | Performed by: NURSE PRACTITIONER

## 2017-11-27 RX ORDER — ALBUTEROL SULFATE 90 UG/1
2 AEROSOL, METERED RESPIRATORY (INHALATION) EVERY 4 HOURS PRN
Qty: 18 G | Refills: 0 | Status: SHIPPED | OUTPATIENT
Start: 2017-11-27 | End: 2021-08-23

## 2017-11-27 RX ORDER — LEVOFLOXACIN 500 MG/1
500 TABLET, FILM COATED ORAL DAILY
Qty: 7 TABLET | Refills: 0 | Status: SHIPPED | OUTPATIENT
Start: 2017-11-27 | End: 2018-04-12

## 2017-11-27 RX ORDER — ALBUTEROL SULFATE 1.25 MG/3ML
2.5 SOLUTION RESPIRATORY (INHALATION) ONCE
Status: COMPLETED | OUTPATIENT
Start: 2017-11-27 | End: 2017-11-27

## 2017-11-27 RX ADMIN — ALBUTEROL SULFATE 2.5 MG: 1.25 SOLUTION RESPIRATORY (INHALATION) at 11:55

## 2017-11-27 NOTE — PROGRESS NOTES
Subjective   Gita Avelar is a 72 y.o. female. She states she aspirated a piece of food last Friday and thinks this went into her lung. States she coughed and coughed until she got the food came out but still feels like it is stuck in her right lung. C/o right sided throat pain, SOB, coughing, chest pain, wheezing. Cough is non productive. Took Tylenol but not really helping much.   She is also complaining of some dysuria and bladder pain that started about 2 weeks ago. She thinks she might have a UTI. She denies frequency, just the dysuria.    Wheezing    This is a new problem. The current episode started in the past 7 days. The problem occurs constantly. The problem has been unchanged. Associated symptoms include coughing and shortness of breath. Pertinent negatives include no chills or fever. Sore throat: right sided. Nothing aggravates the symptoms. She has tried nothing for the symptoms. The treatment provided no relief.   Cough   This is a new problem. The current episode started in the past 7 days. The problem has been unchanged. The problem occurs constantly. The cough is non-productive. Associated symptoms include shortness of breath and wheezing. Pertinent negatives include no chills or fever. Sore throat: right sided. Nothing aggravates the symptoms. She has tried rest for the symptoms. The treatment provided no relief.   Difficulty Urinating   This is a new problem. The current episode started 1 to 4 weeks ago. The problem occurs intermittently. The problem has been gradually worsening. Associated symptoms include coughing and fatigue. Pertinent negatives include no chills or fever. Sore throat: right sided. Nothing aggravates the symptoms. She has tried nothing for the symptoms.        The following portions of the patient's history were reviewed and updated as appropriate: allergies, current medications, past medical history, past social history, past surgical history and problem list.    Review of  "Systems   Constitutional: Positive for fatigue. Negative for chills and fever.   HENT: Sore throat: right sided.    Respiratory: Positive for cough, shortness of breath and wheezing.    Genitourinary: Positive for difficulty urinating and dysuria. Negative for flank pain, frequency and urgency.   Psychiatric/Behavioral: Negative.        Objective   Physical Exam   Constitutional: She is oriented to person, place, and time. Vital signs are normal. She appears well-developed and well-nourished. She is cooperative.   HENT:   Nose: Mucosal edema and rhinorrhea present.   Mouth/Throat: Uvula is midline and mucous membranes are normal. Posterior oropharyngeal erythema present.   Cardiovascular: Normal rate, regular rhythm and normal heart sounds.    Pulmonary/Chest: She has wheezes in the right upper field, the right middle field and the left upper field. She has rhonchi in the right upper field.   Abdominal: Soft. Normal appearance, normal aorta and bowel sounds are normal.   Neurological: She is alert and oriented to person, place, and time.   Psychiatric: She has a normal mood and affect. Her speech is normal and behavior is normal. Thought content normal. Cognition and memory are normal.   Nursing note and vitals reviewed.    Vitals:    11/27/17 1059   BP: 136/76   Pulse: 90   Temp: 98 °F (36.7 °C)   TempSrc: Oral   SpO2: 93%   Weight: 147 lb (66.7 kg)   Height: 65\" (165.1 cm)     Xray of chests ordered for possible aspiration and reviewed by me as suspicious for right sided pneumonia. No previous films available for comparison. Will await radiology opinion.     Assessment/Plan   Diagnoses and all orders for this visit:    Wheezing  -     XR Chest PA & Lateral  -     albuterol (PROVENTIL) nebulizer solution 0.042% 1.25 mg/3mL; Take 6 mL by nebulization 1 (One) Time.    Dysuria  -     POC Urinalysis Dipstick, Automated    Wheezy bronchitis  -     levoFLOXacin (LEVAQUIN) 500 MG tablet; Take 1 tablet by mouth " Daily.    Urinary tract infection without hematuria, site unspecified  -     levoFLOXacin (LEVAQUIN) 500 MG tablet; Take 1 tablet by mouth Daily.    Bronchitis, possible pneumonia. Will go ahead and treat as she is still wheezing although afebrile.   Use albuterol inhaler for the wheezing every 6 hours for next 2 days.  RTC for followup and recheck on Thurs or Fri. To ER if starts running fever or shortness of breath gets worse.    UTI: Will treat with the Levaquin as using that to cover for possible pneumonia

## 2017-12-11 RX ORDER — ROSUVASTATIN CALCIUM 20 MG/1
TABLET, COATED ORAL
Qty: 90 TABLET | Refills: 0 | Status: SHIPPED | OUTPATIENT
Start: 2017-12-11 | End: 2018-04-12 | Stop reason: SDUPTHER

## 2018-01-17 ENCOUNTER — TELEPHONE (OUTPATIENT)
Dept: SURGERY | Facility: CLINIC | Age: 73
End: 2018-01-17

## 2018-01-17 NOTE — TELEPHONE ENCOUNTER
[facility] 2/15/2018 bilateral screening mammogram: Heterogenous a dense tissue in each breast greatest in the anterior two thirds.  Circumscribed nodule upper outer right breast is stable.  BI-RADS 2.

## 2018-01-22 ENCOUNTER — TELEPHONE (OUTPATIENT)
Dept: SURGERY | Facility: CLINIC | Age: 73
End: 2018-01-22

## 2018-01-22 NOTE — TELEPHONE ENCOUNTER
Ms. Avelar called canceled her appt this morning,  She is sick,  Rescheduled to 2/19/18 at 12:45    kdl

## 2018-02-09 RX ORDER — ESCITALOPRAM OXALATE 20 MG/1
TABLET ORAL
Qty: 90 TABLET | Refills: 0 | Status: SHIPPED | OUTPATIENT
Start: 2018-02-09 | End: 2018-04-12 | Stop reason: SDUPTHER

## 2018-02-19 ENCOUNTER — OFFICE VISIT (OUTPATIENT)
Dept: SURGERY | Facility: CLINIC | Age: 73
End: 2018-02-19

## 2018-02-19 VITALS
BODY MASS INDEX: 24.49 KG/M2 | HEIGHT: 65 IN | WEIGHT: 147 LBS | SYSTOLIC BLOOD PRESSURE: 150 MMHG | HEART RATE: 79 BPM | DIASTOLIC BLOOD PRESSURE: 84 MMHG | OXYGEN SATURATION: 95 %

## 2018-02-19 DIAGNOSIS — Z80.0 FAMILY HISTORY OF MALIGNANT NEOPLASM OF PANCREAS: ICD-10-CM

## 2018-02-19 DIAGNOSIS — D24.2 PAPILLOMA OF LEFT BREAST: Primary | ICD-10-CM

## 2018-02-19 PROCEDURE — 99212 OFFICE O/P EST SF 10 MIN: CPT | Performed by: SURGERY

## 2018-02-19 NOTE — PROGRESS NOTES
Chief Complaint: Gita Avelar is a 73 y.o. female who was seen in consultation at the request of DARLENE Arriaga  for breast mass and a followup visit    History of Present Illness:  Patient presents with abnormal breast imaging and nipple discharge.     She noted in March spontaneous bloody nipple drainage from the LEFT breast on her shirt.    She noted no new masses, skin changes, other nipple changes prior to her most recent imaging.  Her most recent imaging includes the followin17 [facility] screening mammogram scattered fiber glandular densities.  BI-RADS 1.  3- [facility] - left diagnostic mammogram and ultrasound for new left bloody nipple discharge showed scattered fiber glandular densities.  Ultrasound showed a small apparently intraductal nodule 4 mm likely intraductal papilloma.  BI-RADS 4 surgical consultation is recommended for possible excision.    She has had 2 excisional biopsies on each breast for done in the 1970s.  She tells me that the excisions near the nipple in the past were also for her nipple drainage.  She has her ovaries, is postmenopausal, and takes no hormones.  Her family history includes the following: No daughters, 2 sisters, 5 maternal aunts, 6 paternal aunts.  No breast or ovarian cancer in her family.  She is here for evaluation.      Pathology from 17 excisional biopsy returne d as intraductal papilloma without atypia.    SHe denies any redness warmth or drainage from the incision.  She denies any nipple discharge since surgery.    Interval history:  In the interim,  Gita Avelar  has done well.  She has noted no changes in her breast exam. No new masses, skin changes, nipple changes, nipple discharge either breast.   She denies headache, bone pain, belly pain, cough, changes in vision or gait.  Her most recent imaging includes the following:  [facility] 2/15/2018 bilateral screening mammogram: Heterogenous a dense tissue in each  breast greatest in the anterior two thirds.  Circumscribed nodule upper outer right breast is stable.  BI-RADS 2.    Review of Systems:  Review of Systems   Constitutional: Positive for unexpected weight change (5 lb wt gain).   Gastrointestinal: Positive for constipation.   All other systems reviewed and are negative.       Past Medical and Surgical History:  Breast Biopsy History:  Patient has had the following breast biopsies:1970 & 1990s, right and left breast biopsies x 4, all benign   Breast Cancer HIstory:  Patient does not have a past medical history of breast cancer.  Breast Operations, and year:  None   Obstetric/Gynecologic History:  Age menstrual periods began: 12 yrs old   Patient is postmenopausal due to removal of her uterus in the following year: 1972   Number of pregnancies: 4  Number of live births: 3  Number of abortions or miscarriages: 1  Age of delivery of first child: 16  Patient did not breast feed.  Length of time taking birth control pills: 6 yrs   Patient has never taken hormone replacement  Patient has uterus removed in 1972, still has both ovaries.     Past Surgical History:   Procedure Laterality Date   • BREAST BIOPSY     • BREAST BIOPSY Left 5/4/2017    Procedure: left breast excisional biopsy using a lacrimal duct probe and the intraoperative ultrasound. ;  Surgeon: Mallory Lieberman MD;  [place] [facility] [place] [facility] [place]  Service:    • HYSTERECTOMY         Past Medical History:   Diagnosis Date   • Arthritis     HANDS   • B12 deficiency    • Bladder prolapse, female, acquired    • Depression    • Diverticulitis of colon    • History of Guillain-Melbourne syndrome    • History of subdural hematoma    • Hyperlipidemia    • Macular degeneration of left eye    • Osteoporosis    • Stress incontinence    • Vitamin D deficiency        Prior Hospitalizations, other than for surgery or childbirth, and year:  Diverticulitis & Subdural hematoma    Social History     Social History   • Marital status:  "     Spouse name: N/A   • Number of children: N/A   • Years of education: N/A     Occupational History   • Not on file.     Social History Main Topics   • Smoking status: Former Smoker     Packs/day: 0.50     Years: 25.00     Types: Cigarettes     Start date: 1992     Quit date: 10/27/2017   • Smokeless tobacco: Never Used   • Alcohol use Yes      Comment: occasionally    • Drug use: No   • Sexual activity: Not on file     Other Topics Concern   • Not on file     Social History Narrative     Patient is .  Patient is retired.  Patient drinks 4 servings of caffeine per day.    Family History:  Family History   Problem Relation Age of Onset   • Hypertension Mother    • Pancreatic cancer Mother    • Hypertension Father    • Deep vein thrombosis Father    • Hyperlipidemia Father    • Pancreatic cancer Sister    • Arthritis Sister    • Liver cancer Sister    • Rectal cancer Sister    • Colon cancer Sister    • Pancreatic cancer Brother    • Liver cancer Brother    • Prostate cancer Brother        Vital Signs:  /84  Pulse 79  Ht 165.1 cm (65\")  Wt 66.7 kg (147 lb)  SpO2 95%  BMI 24.46 kg/m2     Medications:    Current Outpatient Prescriptions:   •  albuterol (PROVENTIL HFA;VENTOLIN HFA) 108 (90 Base) MCG/ACT inhaler, Inhale 2 puffs Every 4 (Four) Hours As Needed for Wheezing., Disp: 18 g, Rfl: 0  •  aspirin 81 MG tablet, Take 81 mg by mouth Daily., Disp: , Rfl:   •  cholecalciferol (VITAMIN D3) 1000 UNITS tablet, Take 1,000 Units by mouth Daily., Disp: , Rfl:   •  escitalopram (LEXAPRO) 20 MG tablet, Take 20 mg by mouth Daily., Disp: , Rfl:   •  escitalopram (LEXAPRO) 20 MG tablet, TAKE ONE TABLET BY MOUTH ONCE DAILY, Disp: 90 tablet, Rfl: 0  •  HYDROcodone-acetaminophen (NORCO) 5-325 MG per tablet, Take 1 tablet by mouth Every 4 (Four) Hours As Needed., Disp: , Rfl:   •  levoFLOXacin (LEVAQUIN) 500 MG tablet, Take 1 tablet by mouth Daily., Disp: 7 tablet, Rfl: 0  •  metroNIDAZOLE (FLAGYL) 500 MG " "tablet, Take 1 tablet by mouth 3 (Three) Times a Day., Disp: 30 tablet, Rfl: 0  •  ondansetron (ZOFRAN) 4 MG tablet, Take 4 mg by mouth Every 8 (Eight) Hours As Needed., Disp: , Rfl:   •  Probiotic Product (PROBIOTIC DAILY PO), Take 1 tablet by mouth Daily., Disp: , Rfl:   •  rosuvastatin (CRESTOR) 20 MG tablet, Take 20 mg by mouth Daily., Disp: , Rfl:   •  rosuvastatin (CRESTOR) 20 MG tablet, TAKE ONE TABLET BY MOUTH AT BEDTIME, Disp: 90 tablet, Rfl: 0     Allergies:  No Known Allergies    Physical Examination:  /84  Pulse 79  Ht 165.1 cm (65\")  Wt 66.7 kg (147 lb)  SpO2 95%  BMI 24.46 kg/m2  General Appearance:  Patient is in no distress.  She is well kept and has an average build.   Psychiatric:  Patient with appropriate mood and affect. Alert and oriented to self, time, and place.    Breast, RIGHT:  medium sized, symmetric with the contralateral side. There are well healed periareolar and upper outer quadrant lateral incisions on each breast from excisional biopsies in the 1970s.     Breast skin is without erythema, edema, rashes.  There are no visible abnormalities upon inspection during the arm-raising maneuver or with hands on hips in the sitting position. There is no nipple retraction, discharge or nipple/areolar skin changes.There are no  masses palpable in the sitting or supine positions.    Breast, LEFT:  medium sized, symmetric with the contralateral side.  There are well healed periareolar and upper outer quadrant lateral incisions on each breast from excisional biopsies in the 1970s.  There is a faint radial incision LEFT medial areola from her 2017 excision papilloma. Breast skin is without erythema, edema, rashes.  There are no visible abnormalities upon inspection during the arm-raising maneuver or with hands on hips in the sitting position. There is no nipple retraction, or nipple/areolar skin changes.There are no other  masses palpable in the sitting or supine " positions.    Lymphatic:  There is no axillary, cervical, infraclavicular, or supraclavicular adenopathy bilaterally.  Eyes:  Pupils are round and reactive to light.  Cardiovascular:  Heart rate and rhythm are regular.  Respiratory:  Lungs are clear bilaterally with no crackles or wheezes in any lung field.  Gastrointestinal:  Abdomen is soft, nondistended, and nontender.     Musculoskeletal:  Good strength in all 4 extremities.   There is good range of motion in both shoulders.    Skin:  No new skin lesions or rashes on the skin excluding the breast (see breast exam above).        Imagin/13/17 [facility] BILATERAL SCREENING MAMMO JOON SUNG  Scattered fibroglandular tissues.  BIRADS: 1 Negative    3/27/17     [facility] LEFT MAMMO  PINEDAPATRICIOJOON  New left bloody nipple discharge.  Scattered fibroglandular densities.  Ultrasound subareolar right breast. Small apparent intraductal nodule measuring about 4 mm. Likely an intraductal papilloma.  BIRADS: 4    [facility] 2/15/2018 bilateral screening mammogram: Heterogenous a dense tissue in each breast greatest in the anterior two thirds.  Circumscribed nodule upper outer right breast is stable.  BI-RADS 2.    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   FOCAL DUCTAL HYPERPLASIA OF THE USUAL TYPE.   FIBROCYSTIC CHANGES WITH DUCT DILATATION AND STASIS, MICROCYST FORMATION, AND  APOCRINE METAPLASIA.   SMALL ARTERIES WITH MEDIAL CALCIFIC SCLEROSIS.   SURGICAL MARGINS FREE OF ATYPIA AND PAPILLOMA.   NO EVIDENCE OF MALIGNANCY.         TDJ/hmf IHC/a/CMK       Procedures:      Assessment:   Diagnosis Plan   1. Papilloma of left breast     2. Family history of malignant neoplasm of pancreas          1-  LEFT breast 9:30-10:00 retroareolar location, just  outside of the nipple papule. 1.5 cm on exam and immediately beneath the skin surface. Single duct clear heme negative drainage since March 2017. On US a 4mm nodule, 7x17mm on my office US    5-4-17 excisional biopsy (palp and US visible) - intraductal papilloma without atypia    2-   mother 80, sister 68, brother 72      Plan:  Mrs. Avelar is doing well At her interval follow-up visit from excision of papilloma left breast.  Her examination and her follow-up mammogram are in good order.  At this juncture she can return to once a year routine annual screening mammography.  I encouraged her to continue her self breast exam in addition to a once year clinical breast exam.  Her next regular mammogram will be due January 16, 2019 at [facility].  I have not given her a routine follow-up in our office but asked her to call us in the future with any concerns about imaging her examination the be happy see her back.         Note her family history of pancreas cancer.  She and I discussed the option of talking with genetics counseling about this and she was not interested in that at this time but will let us know if she were to change her mind.    Mallory Lieberman MD        .      Next Appointment:  Return for any future concerns.      EMR Dragon/transcription disclaimer:    Much of this encounter note is an electronic transcription/translocation of spoken language to printed text.  The electronic translation of spoken language may permit erroneous, or at times, nonsensical words or phrases to be inadvertently transcribed.  Although I have reviewed the note from such areas, some may still exist.

## 2018-04-12 ENCOUNTER — OFFICE VISIT (OUTPATIENT)
Dept: FAMILY MEDICINE CLINIC | Facility: CLINIC | Age: 73
End: 2018-04-12

## 2018-04-12 ENCOUNTER — TELEPHONE (OUTPATIENT)
Dept: FAMILY MEDICINE CLINIC | Facility: CLINIC | Age: 73
End: 2018-04-12

## 2018-04-12 VITALS
HEIGHT: 65 IN | OXYGEN SATURATION: 96 % | TEMPERATURE: 98.9 F | WEIGHT: 147 LBS | HEART RATE: 84 BPM | SYSTOLIC BLOOD PRESSURE: 132 MMHG | DIASTOLIC BLOOD PRESSURE: 74 MMHG | BODY MASS INDEX: 24.49 KG/M2

## 2018-04-12 DIAGNOSIS — K57.92 DIVERTICULITIS OF INTESTINE WITHOUT PERFORATION OR ABSCESS WITHOUT BLEEDING, UNSPECIFIED PART OF INTESTINAL TRACT: Primary | ICD-10-CM

## 2018-04-12 PROCEDURE — 85027 COMPLETE CBC AUTOMATED: CPT | Performed by: NURSE PRACTITIONER

## 2018-04-12 PROCEDURE — 99213 OFFICE O/P EST LOW 20 MIN: CPT | Performed by: NURSE PRACTITIONER

## 2018-04-12 PROCEDURE — 36415 COLL VENOUS BLD VENIPUNCTURE: CPT | Performed by: NURSE PRACTITIONER

## 2018-04-12 RX ORDER — LEVOFLOXACIN 500 MG/1
500 TABLET, FILM COATED ORAL DAILY
Qty: 7 TABLET | Refills: 0 | Status: SHIPPED | OUTPATIENT
Start: 2018-04-12 | End: 2018-08-06

## 2018-04-12 NOTE — TELEPHONE ENCOUNTER
Patient called c/o LLQ pain since this morning. Had fever and chills last night. She has h/o diverticulitis and wants an antibiotic called in today. She does not think she can drive for appt.     [phone]

## 2018-04-12 NOTE — PROGRESS NOTES
Subjective   Gita Avelar is a 73 y.o. female who presents to us today with diverticulitis. She has had multiple episodes of the same and pain today feels the same as before. She ate pimento cheese last week and thinks that made her diverticulitis act up again. She was constipated and took some stuff and finally went and then the abdominal pain started. Did have temp today to 100 but has since resolved. No nausea or vomiting.  She has flagyl at home already, needs antibiotic.    Abdominal Pain   This is a new problem. The current episode started yesterday. The onset quality is sudden. The problem occurs constantly. The problem has been gradually worsening. The pain is located in the LLQ. The pain is at a severity of 5/10. The quality of the pain is sharp. The abdominal pain does not radiate. Associated symptoms include constipation, a fever, nausea and vomiting. The pain is aggravated by movement. The pain is relieved by nothing. She has tried nothing for the symptoms.        The following portions of the patient's history were reviewed and updated as appropriate: allergies, current medications, past family history, past medical history, past social history, past surgical history and problem list.    Review of Systems   Constitutional: Positive for fever.   Respiratory: Negative.    Cardiovascular: Negative.    Gastrointestinal: Positive for abdominal pain, constipation, nausea and vomiting.       Objective   Physical Exam   Constitutional: Vital signs are normal. She appears well-developed and well-nourished. She is cooperative.   Cardiovascular: Normal rate and regular rhythm.    Pulmonary/Chest: Effort normal and breath sounds normal.   Abdominal: Soft. Normal appearance and bowel sounds are normal. There is tenderness in the right lower quadrant and left lower quadrant.   Neurological: She is alert.   Nursing note and vitals reviewed.    Vitals:    04/12/18 1429   BP: 132/74   BP Location: Right arm   Patient  "Position: Sitting   Cuff Size: Adult   Pulse: 84   Temp: 98.9 °F (37.2 °C)   TempSrc: Oral   SpO2: 96%   Weight: 66.7 kg (147 lb)   Height: 165.1 cm (65\")     WBC 7.7,   H/H 14.3/43.1    Assessment/Plan   Gita was seen today for diverticulitis.    Diagnoses and all orders for this visit:    Diverticulitis of intestine without perforation or abscess without bleeding, unspecified part of intestinal tract  -     levoFLOXacin (LEVAQUIN) 500 MG tablet; Take 1 tablet by mouth Daily.  -     POC CBC    Advised if fever returns, abdominal pain increased, vomiting, she should go to the ED.  She understands             "

## 2018-07-19 ENCOUNTER — OFFICE VISIT (OUTPATIENT)
Dept: FAMILY MEDICINE CLINIC | Facility: CLINIC | Age: 73
End: 2018-07-19

## 2018-07-19 VITALS
WEIGHT: 150 LBS | HEIGHT: 65 IN | DIASTOLIC BLOOD PRESSURE: 82 MMHG | TEMPERATURE: 98.2 F | OXYGEN SATURATION: 96 % | HEART RATE: 82 BPM | BODY MASS INDEX: 24.99 KG/M2 | SYSTOLIC BLOOD PRESSURE: 126 MMHG

## 2018-07-19 DIAGNOSIS — R53.83 FATIGUE, UNSPECIFIED TYPE: ICD-10-CM

## 2018-07-19 DIAGNOSIS — R53.1 WEAKNESS: ICD-10-CM

## 2018-07-19 DIAGNOSIS — R30.0 DYSURIA: ICD-10-CM

## 2018-07-19 DIAGNOSIS — E55.9 VITAMIN D DEFICIENCY: ICD-10-CM

## 2018-07-19 DIAGNOSIS — H65.92 OME (OTITIS MEDIA WITH EFFUSION), LEFT: ICD-10-CM

## 2018-07-19 DIAGNOSIS — E78.2 MIXED HYPERLIPIDEMIA: ICD-10-CM

## 2018-07-19 DIAGNOSIS — F33.2 SEVERE EPISODE OF RECURRENT MAJOR DEPRESSIVE DISORDER, WITHOUT PSYCHOTIC FEATURES (HCC): Primary | ICD-10-CM

## 2018-07-19 PROBLEM — F32.9 MAJOR DEPRESSIVE DISORDER: Status: ACTIVE | Noted: 2018-07-19

## 2018-07-19 LAB
BILIRUB BLD-MCNC: NEGATIVE MG/DL
CLARITY, POC: CLEAR
COLOR UR: YELLOW
GLUCOSE UR STRIP-MCNC: NEGATIVE MG/DL
KETONES UR QL: NEGATIVE
LEUKOCYTE EST, POC: ABNORMAL
NITRITE UR-MCNC: NEGATIVE MG/ML
PH UR: 5.5 [PH] (ref 5–8)
PROT UR STRIP-MCNC: NEGATIVE MG/DL
RBC # UR STRIP: ABNORMAL /UL
SP GR UR: 1.02 (ref 1–1.03)
UROBILINOGEN UR QL: NORMAL

## 2018-07-19 PROCEDURE — 81003 URINALYSIS AUTO W/O SCOPE: CPT | Performed by: NURSE PRACTITIONER

## 2018-07-19 PROCEDURE — 99214 OFFICE O/P EST MOD 30 MIN: CPT | Performed by: NURSE PRACTITIONER

## 2018-07-19 RX ORDER — CEPHALEXIN 500 MG/1
500 CAPSULE ORAL 3 TIMES DAILY
Qty: 30 CAPSULE | Refills: 0 | Status: SHIPPED | OUTPATIENT
Start: 2018-07-19 | End: 2018-08-06

## 2018-07-19 RX ORDER — ESCITALOPRAM OXALATE 5 MG/1
5 TABLET ORAL DAILY
Qty: 14 TABLET | Refills: 0 | Status: SHIPPED | OUTPATIENT
Start: 2018-07-19 | End: 2018-12-03 | Stop reason: ALTCHOICE

## 2018-07-19 NOTE — PROGRESS NOTES
Subjective   Gita Avelar is a 73 y.o. female. Has numerous complaints today:  Ear pressure in her L ear. Leg weakness, thinks has UTI and her depression medication is not working.   Pressure in the left ear which started about one week ago. Some pain, mostly feels like pressure. No fever or chills. She feels like there is fluid in the ear and sloshes around when she moves head around.  UTI: Last week she had a sharp pain in the left flank, now urine is cloudy and has an odor to it. Chills last few days. No dysuria, frequency. Some urgency. She wonders if has UTI.   Depression: Does not feel well. Appetite is ok, but eats poorly. On lexapro for long time but doesn't think it's working for her any more. First started on Sinequan a long long time ago, then was changed to Paxil, which didn't really work. She has no real reason for unhappiness. She worries a lot about her money and outliving what she has saved. Thinks her medication needs to be changed.   Having some weakness in both of her legs. This started sometime last year but she can't really pinpoint a cause for this. Has hx of back problems and has been trying to exercise but this aggravates her back issues.     Depression   Visit Type: follow-up  Patient presents with the following symptoms: depressed mood, excessive worry, insomnia and nervousness/anxiety.  Patient is not experiencing: suicidal ideas and suicidal planning.  Frequency of symptoms: constantly   Severity: moderate   Sleep per night: 4 hours  Sleep quality: poor  Nighttime awakenings: none  Compliance with medications:  %        Urinary Tract Infection    This is a new problem. The current episode started in the past 7 days. The problem occurs intermittently. The problem has been unchanged. The quality of the pain is described as burning. There has been no fever. She is not sexually active. There is no history of pyelonephritis. Associated symptoms include chills, flank pain (left) and  urgency. Pertinent negatives include no hematuria. She has tried nothing for the symptoms.   Earache    There is pain in the left ear. This is a new problem. The current episode started in the past 7 days. The problem occurs constantly. The problem has been gradually worsening. There has been no fever. Associated symptoms comments: Fluid sloshing in ear. She has tried nothing for the symptoms. The treatment provided no relief.        The following portions of the patient's history were reviewed and updated as appropriate: allergies, current medications, past family history, past medical history, past social history, past surgical history and problem list.    Review of Systems   Constitutional: Positive for chills and fatigue.   HENT: Positive for ear pain (left). Negative for congestion.    Respiratory: Negative.    Cardiovascular: Negative.    Genitourinary: Positive for flank pain (left) and urgency. Negative for hematuria.   Psychiatric/Behavioral: Positive for dysphoric mood, sleep disturbance and depressed mood. Negative for suicidal ideas. The patient is nervous/anxious and has insomnia.        Objective   Physical Exam   Constitutional: She is oriented to person, place, and time. Vital signs are normal. She appears well-developed and well-nourished. She is cooperative.   HENT:   Left Ear: Hearing normal. Tympanic membrane is injected. A middle ear effusion is present.   Nose: Nose normal.   Cardiovascular: Normal rate, regular rhythm and normal heart sounds.    Pulmonary/Chest: Effort normal and breath sounds normal.   Abdominal: Soft. Normal appearance and bowel sounds are normal. There is CVA tenderness (left).   Neurological: She is alert and oriented to person, place, and time.   Psychiatric: She has a normal mood and affect. Her behavior is normal. Judgment and thought content normal.   Nursing note and vitals reviewed.    Vitals:    07/19/18 1332   BP: 126/82   Pulse: 82   Temp: 98.2 °F (36.8 °C)  "  TempSrc: Oral   SpO2: 96%   Weight: 68 kg (150 lb)   Height: 165.1 cm (65\")       Assessment/Plan   Diagnoses and all orders for this visit:    Severe episode of recurrent major depressive disorder, 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)  -     sertraline (ZOLOFT) 50 MG tablet; Take 1 tablet by mouth Daily.  -     escitalopram (LEXAPRO) 5 MG tablet; Take 1 tablet by mouth Daily.    Fatigue, unspecified type  -     TSH  -     Magnesium    OME (otitis media with effusion), left  -     cephalexin (KEFLEX) 500 MG capsule; Take 1 capsule by mouth 3 (Three) Times a Day.    Weakness  -     TSH  -     Magnesium  -     Vitamin D 25 Hydroxy  -     Vitamin B12    Dysuria  -     POC Urinalysis Dipstick, Automated  -     Urine Culture - Urine, Urine, Clean Catch    Mixed hyperlipidemia  -     Comprehensive Metabolic Panel  -     Lipid Panel    Vitamin D deficiency   -     Vitamin D 25 Hydroxy    Fatigue/Weakness: Check labs. Has been on Crestor? Affecting her legs??  Possibly thyroid.    UTI: Urine with some bacteria. Will culture.    OM: Treat with Keflex so can cover for UTI as well.     Depression: Change antidepressant. Wean Lexapro by taking 10 mg nightly x 2 weeks, then Lexapro 5 mg daily x 2 weeks, then d/c.  Start Zoloft 50 mg daily today. Will likely need to titrate up. Follow up in one month for recheck.            "

## 2018-07-20 LAB
25(OH)D3+25(OH)D2 SERPL-MCNC: 34.1 NG/ML (ref 30–100)
ALBUMIN SERPL-MCNC: 4 G/DL (ref 3.5–4.8)
ALBUMIN/GLOB SERPL: 1.5 {RATIO} (ref 1.2–2.2)
ALP SERPL-CCNC: 100 IU/L (ref 39–117)
ALT SERPL-CCNC: 7 IU/L (ref 0–32)
AST SERPL-CCNC: 19 IU/L (ref 0–40)
BILIRUB SERPL-MCNC: 0.6 MG/DL (ref 0–1.2)
BUN SERPL-MCNC: 17 MG/DL (ref 8–27)
BUN/CREAT SERPL: 18 (ref 12–28)
CALCIUM SERPL-MCNC: 9.3 MG/DL (ref 8.7–10.3)
CHLORIDE SERPL-SCNC: 103 MMOL/L (ref 96–106)
CHOLEST SERPL-MCNC: 229 MG/DL (ref 100–199)
CO2 SERPL-SCNC: 24 MMOL/L (ref 20–29)
CREAT SERPL-MCNC: 0.93 MG/DL (ref 0.57–1)
GLOBULIN SER CALC-MCNC: 2.6 G/DL (ref 1.5–4.5)
GLUCOSE SERPL-MCNC: 88 MG/DL (ref 65–99)
HDLC SERPL-MCNC: 56 MG/DL
LDLC SERPL CALC-MCNC: 152 MG/DL (ref 0–99)
MAGNESIUM SERPL-MCNC: 2.1 MG/DL (ref 1.6–2.3)
POTASSIUM SERPL-SCNC: 4 MMOL/L (ref 3.5–5.2)
PROT SERPL-MCNC: 6.6 G/DL (ref 6–8.5)
SODIUM SERPL-SCNC: 141 MMOL/L (ref 134–144)
TRIGL SERPL-MCNC: 105 MG/DL (ref 0–149)
TSH SERPL DL<=0.005 MIU/L-ACNC: 3.28 UIU/ML (ref 0.45–4.5)
VIT B12 SERPL-MCNC: 482 PG/ML (ref 232–1245)
VLDLC SERPL CALC-MCNC: 21 MG/DL (ref 5–40)

## 2018-07-21 LAB
BACTERIA UR CULT: NORMAL
BACTERIA UR CULT: NORMAL

## 2018-07-25 ENCOUNTER — TELEPHONE (OUTPATIENT)
Dept: FAMILY MEDICINE CLINIC | Facility: CLINIC | Age: 73
End: 2018-07-25

## 2018-07-25 NOTE — TELEPHONE ENCOUNTER
----- Message from DARLENE Espinosa sent at 7/20/2018 10:50 AM EDT -----  Call the patient on the lab results. Call the patient on the lab results. Thyroid, B12, Vitamin D level and magnesium were all normal range. Kidney function and liver function look fine too. Cholesterol levels are about the same, still a little bit elevated. Continue medication. Follow up on the depression like we discussed. Thanks

## 2018-08-06 ENCOUNTER — OFFICE VISIT (OUTPATIENT)
Dept: FAMILY MEDICINE CLINIC | Facility: CLINIC | Age: 73
End: 2018-08-06

## 2018-08-06 VITALS
OXYGEN SATURATION: 96 % | SYSTOLIC BLOOD PRESSURE: 118 MMHG | TEMPERATURE: 98 F | DIASTOLIC BLOOD PRESSURE: 78 MMHG | WEIGHT: 148 LBS | HEART RATE: 65 BPM | HEIGHT: 65 IN | BODY MASS INDEX: 24.66 KG/M2

## 2018-08-06 DIAGNOSIS — H65.192 ACUTE MIDDLE EAR EFFUSION, LEFT: Primary | ICD-10-CM

## 2018-08-06 DIAGNOSIS — F33.2 SEVERE EPISODE OF RECURRENT MAJOR DEPRESSIVE DISORDER, WITHOUT PSYCHOTIC FEATURES (HCC): ICD-10-CM

## 2018-08-06 DIAGNOSIS — H93.12 TINNITUS OF LEFT EAR: ICD-10-CM

## 2018-08-06 PROCEDURE — 99213 OFFICE O/P EST LOW 20 MIN: CPT | Performed by: NURSE PRACTITIONER

## 2018-08-06 RX ORDER — CETIRIZINE HYDROCHLORIDE 10 MG/1
10 TABLET ORAL DAILY
Qty: 30 TABLET | Refills: 2 | Status: SHIPPED | OUTPATIENT
Start: 2018-08-06 | End: 2018-12-03 | Stop reason: HOSPADM

## 2018-08-06 NOTE — PROGRESS NOTES
Subjective   Gita Avelar is a 73 y.o. female who presents to us today with bilateral ringing in ears but has been doing this for a long time.  Patient stated her left ear has a clicking sound in it. Started about month ago, but started again yesterday.   She was supposed to take the Zoloft starting last month but didn't start yet. Was worried it might make her too sleep so weaned off the Lexapro already and will start the Zoloft today.     Depression   Visit Type: follow-up  Patient presents with the following symptoms: depressed mood and excessive worry.  Frequency of symptoms: constantly   Severity: moderate     Earache    There is pain in the left ear. This is a recurrent problem. The current episode started 1 to 4 weeks ago. The problem has been unchanged. There has been no fever. Associated symptoms include ear discharge. She has tried nothing for the symptoms. The treatment provided no relief.        The following portions of the patient's history were reviewed and updated as appropriate: allergies, current medications, past family history, past medical history, past social history, past surgical history and problem list.    Review of Systems   Constitutional: Negative.    HENT: Positive for ear discharge, ear pain and tinnitus.    Respiratory: Negative.    Cardiovascular: Negative.    Psychiatric/Behavioral: Positive for depressed mood.       Objective   Physical Exam   Constitutional: Vital signs are normal. She appears well-developed and well-nourished. She is cooperative.   HENT:   Right Ear: Hearing and tympanic membrane normal.   Left Ear: Hearing normal. No drainage. Tympanic membrane is not erythematous and not bulging. A middle ear effusion (cloudy) is present.   Cardiovascular: Normal rate, regular rhythm and normal heart sounds.    Pulmonary/Chest: Effort normal and breath sounds normal.   Neurological: She is alert.   Psychiatric: She has a normal mood and affect. Her speech is normal and  "behavior is normal. Judgment and thought content normal. Cognition and memory are normal.   Nursing note and vitals reviewed.    Vitals:    08/06/18 1323   BP: 118/78   BP Location: Right arm   Patient Position: Sitting   Cuff Size: Adult   Pulse: 65   Temp: 98 °F (36.7 °C)   TempSrc: Oral   SpO2: 96%   Weight: 67.1 kg (148 lb)   Height: 165.1 cm (65\")       Assessment/Plan   Gita was seen today for depression and ear problem.    Diagnoses and all orders for this visit:    Acute middle ear effusion, left  -     cetirizine (zyrTEC) 10 MG tablet; Take 1 tablet by mouth Daily.    Tinnitus of left ear    Severe episode of recurrent major depressive disorder, 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)    Start the Zoloft. Follow up in one month    Effusion left ear: Antihistamine daily. If worsens rtc for recheck.             "

## 2018-09-21 RX ORDER — ROSUVASTATIN CALCIUM 20 MG/1
TABLET, COATED ORAL
Qty: 90 TABLET | Refills: 0 | Status: SHIPPED | OUTPATIENT
Start: 2018-09-21 | End: 2018-12-03 | Stop reason: HOSPADM

## 2018-12-03 ENCOUNTER — OFFICE VISIT (OUTPATIENT)
Dept: FAMILY MEDICINE CLINIC | Facility: CLINIC | Age: 73
End: 2018-12-03

## 2018-12-03 VITALS
HEART RATE: 73 BPM | BODY MASS INDEX: 24.99 KG/M2 | HEIGHT: 65 IN | OXYGEN SATURATION: 97 % | SYSTOLIC BLOOD PRESSURE: 130 MMHG | WEIGHT: 150 LBS | DIASTOLIC BLOOD PRESSURE: 72 MMHG

## 2018-12-03 DIAGNOSIS — M81.0 AGE-RELATED OSTEOPOROSIS WITHOUT CURRENT PATHOLOGICAL FRACTURE: ICD-10-CM

## 2018-12-03 DIAGNOSIS — F33.2 SEVERE EPISODE OF RECURRENT MAJOR DEPRESSIVE DISORDER, WITHOUT PSYCHOTIC FEATURES (HCC): ICD-10-CM

## 2018-12-03 DIAGNOSIS — R03.0 BLOOD PRESSURE ELEVATED WITHOUT HISTORY OF HTN: Primary | ICD-10-CM

## 2018-12-03 PROBLEM — F32.9 MAJOR DEPRESSIVE DISORDER: Status: RESOLVED | Noted: 2018-07-19 | Resolved: 2018-12-03

## 2018-12-03 PROCEDURE — G0438 PPPS, INITIAL VISIT: HCPCS | Performed by: NURSE PRACTITIONER

## 2018-12-03 PROCEDURE — 99214 OFFICE O/P EST MOD 30 MIN: CPT | Performed by: NURSE PRACTITIONER

## 2018-12-03 RX ORDER — LISINOPRIL 10 MG/1
10 TABLET ORAL DAILY
Qty: 30 TABLET | Refills: 0 | Status: SHIPPED | OUTPATIENT
Start: 2018-12-03 | End: 2019-01-09

## 2018-12-03 RX ORDER — ESCITALOPRAM OXALATE 5 MG/1
5 TABLET ORAL DAILY
Qty: 14 TABLET | Refills: 0 | Status: SHIPPED | OUTPATIENT
Start: 2018-12-03 | End: 2019-01-09 | Stop reason: SDUPTHER

## 2018-12-03 NOTE — PROGRESS NOTES
"Subjective   Gita Avelar is a 73 y.o. female who presents to us today for her blood pressure and medicare wellness exam.     History of Present Illness   Feeling dizzy weak and nauseous. Has to sit down and passes in 5-10 minutes. BP has been more elevated at home 190/100. Was elevated when had cataract surgery last month.  Could not tolerate 50mg zoloft, as was sleeping all the time, so just taking 25 mg.   Does not feel was helpful for depression at this dose. lexapro was working better than zoloft in the past.   Constipation constant x 5 yrs, colonoscopy was normal. Does eat a high sodium diet with prepared [employer] food.  Now off crestor secondary to joint aches, which have resolved off crestor.   Stopped smoking last year.   The following portions of the patient's history were reviewed and updated as appropriate: allergies, current medications, past family history, past medical history, past social history, past surgical history and problem list.    Review of Systems   Constitutional: Positive for activity change and fatigue. Negative for chills, fever, unexpected weight gain and unexpected weight loss.   HENT: Negative.    Respiratory: Negative.    Cardiovascular: Positive for leg swelling (mild R ankle swelling). Negative for chest pain.   Gastrointestinal: Positive for abdominal pain and constipation (constant, laxatives every other day. ).   Musculoskeletal: Negative.    Skin: Negative.    Allergic/Immunologic: Negative.    Neurological: Positive for dizziness and light-headedness.   Hematological: Negative.    Psychiatric/Behavioral: Positive for sleep disturbance (10-2am) and depressed mood. Negative for self-injury, suicidal ideas and stress.     /72 (BP Location: Left arm, Patient Position: Sitting, Cuff Size: Adult)   Pulse 73   Ht 165.1 cm (65\")   Wt 68 kg (150 lb)   SpO2 97%   BMI 24.96 kg/m²     Objective   Physical Exam   Constitutional: She appears well-developed and " well-nourished.   HENT:   Right Ear: Tympanic membrane normal.   Left Ear: Tympanic membrane normal.   Nose: Nose normal.   Mouth/Throat: Mucous membranes are normal. Posterior oropharyngeal erythema present.   Neck: Normal range of motion. Neck supple. No thyromegaly present.   Cardiovascular: Normal rate, regular rhythm and normal heart sounds.   Pulmonary/Chest: Effort normal and breath sounds normal.   Lymphadenopathy:     She has no cervical adenopathy.   Skin: Skin is warm and dry.   Psychiatric: Her speech is normal and behavior is normal. Judgment and thought content normal. She exhibits a depressed mood.   Nursing note and vitals reviewed.    Assessment/Plan   Problems Addressed this Visit        Musculoskeletal and Integument    OP (osteoporosis)       Other    Blood pressure elevated without history of HTN - Primary      Other Visit Diagnoses     Severe episode of recurrent major depressive disorder, 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)        Relevant Medications    escitalopram (LEXAPRO) 5 MG tablet        Insomnia and depression--If changing back to lexapro doesn't fix sleep maintenance, recommend follow up 1 month.   HTN--with orthostasis, consider start low dose lisinopril and follow up 1 month.   Osteoporosis--recommend update Dexa, declines update today  HLD--consider updating labs, fasting 1 month.

## 2018-12-03 NOTE — PROGRESS NOTES
QUICK REFERENCE INFORMATION:  The ABCs of the Annual Wellness Visit    Initial Medicare Wellness Visit    HEALTH RISK ASSESSMENT    1945    Recent Hospitalizations:  No hospitalization(s) within the last year..        Current Medical Providers:  Patient Care Team:  Xiomara Batres APRN as PCP - General (Family Medicine)        Smoking Status:  Social History     Tobacco Use   Smoking Status Former Smoker   • Packs/day: 0.50   • Years: 25.00   • Pack years: 12.50   • Types: Cigarettes   • Start date:    • Last attempt to quit: 10/27/2017   • Years since quittin.1   Smokeless Tobacco Never Used       Alcohol Consumption:  Social History     Substance and Sexual Activity   Alcohol Use Yes    Comment: occasionally        Depression Screen:   PHQ-2/PHQ-9 Depression Screening 12/3/2018   Little interest or pleasure in doing things 0   Feeling down, depressed, or hopeless 1   Trouble falling or staying asleep, or sleeping too much 3   Feeling tired or having little energy 3   Poor appetite or overeating 3   Feeling bad about yourself - or that you are a failure or have let yourself or your family down 0   Trouble concentrating on things, such as reading the newspaper or watching television 3   Moving or speaking so slowly that other people could have noticed. Or the opposite - being so fidgety or restless that you have been moving around a lot more than usual 0   Thoughts that you would be better off dead, or of hurting yourself in some way 0   Total Score 13   If you checked off any problems, how difficult have these problems made it for you to do your work, take care of things at home, or get along with other people? Not difficult at all       Health Habits and Functional and Cognitive Screening:  Functional & Cognitive Status 12/3/2018   Do you have difficulty preparing food and eating? No   Do you have difficulty bathing yourself, getting dressed or grooming yourself? No   Do you have difficulty using  the toilet? No   Do you have difficulty moving around from place to place? No   Do you have trouble with steps or getting out of a bed or a chair? No   In the past year have you fallen or experienced a near fall? Yes   Current Diet Well Balanced Diet   Dental Exam Not up to date   Eye Exam Up to date   Exercise (times per week) 0 times per week   Current Exercise Activities Include None   Do you need help using the phone?  No   Are you deaf or do you have serious difficulty hearing?  No   Do you need help with transportation? No   Do you need help shopping? No   Do you need help preparing meals?  No   Do you need help with housework?  No   Do you need help with laundry? No   Do you need help taking your medications? No   Do you need help managing money? No   Do you ever drive or ride in a car without wearing a seat belt? No   Have you felt unusual stress, anger or loneliness in the last month? No   Who do you live with? Alone   If you need help, do you have trouble finding someone available to you? No   Have you been bothered in the last four weeks by sexual problems? No   Do you have difficulty concentrating, remembering or making decisions? No           Does the patient have evidence of cognitive impairment? No    Asiprin use counseling: Taking ASA appropriately as indicated      Recent Lab Results:    Visual Acuity:  No exam data present    Age-appropriate Screening Schedule:  Refer to the list below for future screening recommendations based on patient's age, sex and/or medical conditions. Orders for these recommended tests are listed in the plan section. The patient has been provided with a written plan.    Health Maintenance   Topic Date Due   • TDAP/TD VACCINES (1 - Tdap) 01/13/1964   • ZOSTER VACCINE (1 of 2) 01/13/1995   • DXA SCAN  07/07/2017   • PNEUMOCOCCAL VACCINES (65+ LOW/MEDIUM RISK) (2 of 2 - PPSV23) 03/20/2018   • LIPID PANEL  07/19/2019   • MAMMOGRAM  01/15/2020   • COLONOSCOPY  02/05/2026   •  INFLUENZA VACCINE  Addressed        Subjective   History of Present Illness    Gita Avelar is a 73 y.o. female who presents for an Annual Wellness Visit.    The following portions of the patient's history were reviewed and updated as appropriate: allergies, current medications, past family history, past medical history, past social history, past surgical history and problem list.    Outpatient Medications Prior to Visit   Medication Sig Dispense Refill   • aspirin 81 MG tablet Take 81 mg by mouth Daily.     • cholecalciferol (VITAMIN D3) 1000 UNITS tablet Take 1,000 Units by mouth Daily.     • sertraline (ZOLOFT) 50 MG tablet Take 1 tablet by mouth Daily. (Patient taking differently: Take 25 mg by mouth Daily.) 30 tablet 1   • albuterol (PROVENTIL HFA;VENTOLIN HFA) 108 (90 Base) MCG/ACT inhaler Inhale 2 puffs Every 4 (Four) Hours As Needed for Wheezing. 18 g 0   • cetirizine (zyrTEC) 10 MG tablet Take 1 tablet by mouth Daily. 30 tablet 2   • escitalopram (LEXAPRO) 5 MG tablet Take 1 tablet by mouth Daily. 14 tablet 0   • rosuvastatin (CRESTOR) 20 MG tablet Take 20 mg by mouth Daily.     • rosuvastatin (CRESTOR) 20 MG tablet TAKE ONE TABLET BY MOUTH AT BEDTIME 90 tablet 0     No facility-administered medications prior to visit.        Patient Active Problem List   Diagnosis   • Avitaminosis D   • B12 deficiency   • Near syncope   • Atrophic vaginitis   • Awareness of heartbeats   • OP (osteoporosis)   • HLD (hyperlipidemia)   • Fatigue   • Blood pressure elevated without history of HTN   • Blues   • Nipple discharge   • Abnormal ultrasound of breast   • Abnormal mammogram   • Lump or mass in breast   • Family history of malignant neoplasm of pancreas   • Major depressive disorder       Advance Care Planning:  has NO advance directive - information provided to the patient today    Identification of Risk Factors:  Risk factors include: depression.    Review of Systems    Compared to one year ago, the patient feels  "her physical health is worse.  Compared to one year ago, the patient feels her mental health is worse.    Objective     Physical Exam    Vitals:    12/03/18 1303   BP: 130/72   BP Location: Left arm   Patient Position: Sitting   Cuff Size: Adult   Pulse: 73   SpO2: 97%   Weight: 68 kg (150 lb)   Height: 165.1 cm (65\")       Patient's Body mass index is 24.96 kg/m². BMI is within normal parameters. No follow-up required.      Assessment/Plan   Patient Self-Management and Personalized Health Advice  The patient has been provided with information about: designing advance directives and preventive services including:   · due for dexa repeat, declines all vaccines including flu and pneumonia.    Visit Diagnoses:  No diagnosis found.    No orders of the defined types were placed in this encounter.      Outpatient Encounter Medications as of 12/3/2018   Medication Sig Dispense Refill   • aspirin 81 MG tablet Take 81 mg by mouth Daily.     • cholecalciferol (VITAMIN D3) 1000 UNITS tablet Take 1,000 Units by mouth Daily.     • sertraline (ZOLOFT) 50 MG tablet Take 1 tablet by mouth Daily. (Patient taking differently: Take 25 mg by mouth Daily.) 30 tablet 1   • albuterol (PROVENTIL HFA;VENTOLIN HFA) 108 (90 Base) MCG/ACT inhaler Inhale 2 puffs Every 4 (Four) Hours As Needed for Wheezing. 18 g 0   • [DISCONTINUED] cetirizine (zyrTEC) 10 MG tablet Take 1 tablet by mouth Daily. 30 tablet 2   • [DISCONTINUED] escitalopram (LEXAPRO) 5 MG tablet Take 1 tablet by mouth Daily. 14 tablet 0   • [DISCONTINUED] rosuvastatin (CRESTOR) 20 MG tablet Take 20 mg by mouth Daily.     • [DISCONTINUED] rosuvastatin (CRESTOR) 20 MG tablet TAKE ONE TABLET BY MOUTH AT BEDTIME 90 tablet 0     No facility-administered encounter medications on file as of 12/3/2018.        Reviewed use of high risk medication in the elderly: yes  Reviewed for potential of harmful drug interactions in the elderly: yes    Follow Up:  No Follow-up on file.     An After " Visit Summary and PPPS with all of these plans were given to the patient.

## 2019-01-09 ENCOUNTER — OFFICE VISIT (OUTPATIENT)
Dept: FAMILY MEDICINE CLINIC | Facility: CLINIC | Age: 74
End: 2019-01-09

## 2019-01-09 VITALS
BODY MASS INDEX: 25.16 KG/M2 | DIASTOLIC BLOOD PRESSURE: 78 MMHG | TEMPERATURE: 98.3 F | OXYGEN SATURATION: 97 % | HEART RATE: 82 BPM | HEIGHT: 65 IN | WEIGHT: 151 LBS | SYSTOLIC BLOOD PRESSURE: 128 MMHG

## 2019-01-09 DIAGNOSIS — E78.2 MIXED HYPERLIPIDEMIA: ICD-10-CM

## 2019-01-09 DIAGNOSIS — I10 ESSENTIAL HYPERTENSION: Primary | ICD-10-CM

## 2019-01-09 DIAGNOSIS — I65.23 ATHEROSCLEROSIS OF BOTH CAROTID ARTERIES: ICD-10-CM

## 2019-01-09 DIAGNOSIS — F33.2 SEVERE EPISODE OF RECURRENT MAJOR DEPRESSIVE DISORDER, WITHOUT PSYCHOTIC FEATURES (HCC): ICD-10-CM

## 2019-01-09 PROCEDURE — 99214 OFFICE O/P EST MOD 30 MIN: CPT | Performed by: NURSE PRACTITIONER

## 2019-01-09 RX ORDER — HYDROXYZINE HYDROCHLORIDE 25 MG/1
25 TABLET, FILM COATED ORAL 3 TIMES DAILY PRN
COMMUNITY
End: 2019-01-09 | Stop reason: SDUPTHER

## 2019-01-09 RX ORDER — HYDROXYZINE HYDROCHLORIDE 25 MG/1
25 TABLET, FILM COATED ORAL 3 TIMES DAILY PRN
Qty: 90 TABLET | Refills: 0 | Status: SHIPPED | OUTPATIENT
Start: 2019-01-09 | End: 2019-04-17 | Stop reason: SDUPTHER

## 2019-01-09 RX ORDER — LISINOPRIL 20 MG/1
TABLET ORAL
COMMUNITY
Start: 2018-12-13 | End: 2019-02-25

## 2019-01-09 RX ORDER — PRAVASTATIN SODIUM 40 MG
40 TABLET ORAL DAILY
Qty: 90 TABLET | Refills: 3 | Status: SHIPPED | OUTPATIENT
Start: 2019-01-09 | End: 2019-04-09

## 2019-01-09 RX ORDER — AMLODIPINE BESYLATE 10 MG/1
TABLET ORAL
COMMUNITY
Start: 2018-12-13 | End: 2019-01-09

## 2019-01-09 RX ORDER — ESCITALOPRAM OXALATE 10 MG/1
10 TABLET ORAL DAILY
Qty: 30 TABLET | Refills: 0 | Status: SHIPPED | OUTPATIENT
Start: 2019-01-09 | End: 2019-02-18 | Stop reason: SDUPTHER

## 2019-01-09 RX ORDER — CLOPIDOGREL BISULFATE 75 MG/1
TABLET ORAL
COMMUNITY
Start: 2018-12-13 | End: 2019-05-31

## 2019-02-18 DIAGNOSIS — F33.2 SEVERE EPISODE OF RECURRENT MAJOR DEPRESSIVE DISORDER, WITHOUT PSYCHOTIC FEATURES (HCC): ICD-10-CM

## 2019-02-18 RX ORDER — ESCITALOPRAM OXALATE 10 MG/1
TABLET ORAL
Qty: 30 TABLET | Refills: 0 | Status: SHIPPED | OUTPATIENT
Start: 2019-02-18 | End: 2019-03-29 | Stop reason: SDUPTHER

## 2019-02-25 ENCOUNTER — OFFICE VISIT (OUTPATIENT)
Dept: FAMILY MEDICINE CLINIC | Facility: CLINIC | Age: 74
End: 2019-02-25

## 2019-02-25 VITALS
HEIGHT: 65 IN | BODY MASS INDEX: 24.66 KG/M2 | DIASTOLIC BLOOD PRESSURE: 68 MMHG | WEIGHT: 148 LBS | SYSTOLIC BLOOD PRESSURE: 124 MMHG | OXYGEN SATURATION: 95 % | HEART RATE: 56 BPM | TEMPERATURE: 98.4 F

## 2019-02-25 DIAGNOSIS — F33.1 MODERATE EPISODE OF RECURRENT MAJOR DEPRESSIVE DISORDER (HCC): ICD-10-CM

## 2019-02-25 DIAGNOSIS — N39.0 ACUTE UTI: ICD-10-CM

## 2019-02-25 DIAGNOSIS — R10.30 LOWER ABDOMINAL PAIN: ICD-10-CM

## 2019-02-25 DIAGNOSIS — R30.0 DYSURIA: Primary | ICD-10-CM

## 2019-02-25 LAB
BILIRUB BLD-MCNC: NEGATIVE MG/DL
CLARITY, POC: CLEAR
COLOR UR: YELLOW
GLUCOSE UR STRIP-MCNC: NEGATIVE MG/DL
HCT VFR BLDA CALC: 40.4 % (ref 38–51)
HGB BLDA-MCNC: 13.5 G/DL (ref 12–17)
KETONES UR QL: NEGATIVE
LEUKOCYTE EST, POC: ABNORMAL
MCH, POC: 30.7
MCHC, POC: 33.3
MCV, POC: 91.9
NITRITE UR-MCNC: NEGATIVE MG/ML
PH UR: 7 [PH] (ref 5–8)
PLATELET # BLD AUTO: 258 10*3/MM3
PMV BLD: 6.9 FL
PROT UR STRIP-MCNC: NEGATIVE MG/DL
RBC # UR STRIP: ABNORMAL /UL
RBC, POC: 4.4
RDW, POC: 13.7
SP GR UR: 1.02 (ref 1–1.03)
UROBILINOGEN UR QL: NORMAL
WBC # BLD: 8.1 10*3/UL

## 2019-02-25 PROCEDURE — 81003 URINALYSIS AUTO W/O SCOPE: CPT | Performed by: NURSE PRACTITIONER

## 2019-02-25 PROCEDURE — 36415 COLL VENOUS BLD VENIPUNCTURE: CPT | Performed by: NURSE PRACTITIONER

## 2019-02-25 PROCEDURE — 85027 COMPLETE CBC AUTOMATED: CPT | Performed by: NURSE PRACTITIONER

## 2019-02-25 PROCEDURE — 99214 OFFICE O/P EST MOD 30 MIN: CPT | Performed by: NURSE PRACTITIONER

## 2019-02-25 RX ORDER — CIPROFLOXACIN 500 MG/1
500 TABLET, FILM COATED ORAL 2 TIMES DAILY
Qty: 14 TABLET | Refills: 0 | Status: SHIPPED | OUTPATIENT
Start: 2019-02-25 | End: 2019-05-31

## 2019-02-25 NOTE — PROGRESS NOTES
"Subjective   Gita Avelar is a 74 y.o. female who presents c/o abd pain, fever, fatigue.     History of Present Illness   Fever as high as 100.6 since Friday, has drank prune juice which caused diarrhea, thinks wiped out all the good bacteria. Had been pretty constipated before that, had gone 14 days without bowel movement. Always constipated. Still having abdominal pain. Hurts to walk. Very fatigued. Does get diverticulitis. No mucus in the stool, just thin watery stools. Has never had a CT of the Abdomen. Episodes typically resolve with antibiotics.   The following portions of the patient's history were reviewed and updated as appropriate: allergies, current medications, past family history, past medical history, past social history, past surgical history and problem list.    Review of Systems   Constitutional: Positive for fever.   Respiratory: Negative.    Cardiovascular: Negative.    Gastrointestinal: Positive for abdominal pain, constipation, diarrhea and nausea. Negative for blood in stool and vomiting.   Genitourinary: Positive for dysuria and frequency.   Psychiatric/Behavioral: Positive for depressed mood.     /68   Pulse 56   Temp 98.4 °F (36.9 °C) (Oral)   Ht 165.1 cm (65\")   Wt 67.1 kg (148 lb)   SpO2 95%   BMI 24.63 kg/m²     Objective   Physical Exam   Constitutional: She appears well-developed and well-nourished. No distress.   Cardiovascular: Normal rate and regular rhythm.   Pulmonary/Chest: Effort normal and breath sounds normal.   Abdominal: Soft. Bowel sounds are normal. She exhibits no distension. There is tenderness in the suprapubic area and left lower quadrant. There is no rebound, no guarding, no CVA tenderness and no tenderness at McBurney's point.   Psychiatric: Her speech is normal and behavior is normal. Judgment and thought content normal. Her mood appears not anxious. Cognition and memory are normal. She exhibits a depressed mood.   Nursing note and vitals " reviewed.    Assessment/Plan   Problems Addressed this Visit        Other    Moderate episode of recurrent major depressive disorder (CMS/[facility])      Other Visit Diagnoses     Dysuria    -  Primary    Relevant Orders    POC Urinalysis Dipstick, Automated (Completed)    POC CBC (Completed)    Urine Culture - Urine, Urine, Clean Catch    Lower abdominal pain        Relevant Orders    POC CBC (Completed)    Urine Culture - Urine, Urine, Clean Catch    Acute UTI        Relevant Medications    ciprofloxacin (CIPRO) 500 MG tablet        UTI--Exam and results most consistent with UTI, but with history of diverticulitis and fever, will Rx FQ, ER cautions given, for worsening symptoms. Encouraged to follow up for depression once acute symptoms are settling.     Results for orders placed or performed in visit on 02/25/19   POC Urinalysis Dipstick, Automated   Result Value Ref Range    Color Yellow Yellow, Straw, Dark Yellow, Monet    Clarity, UA Clear Clear    Specific Gravity  1.020 1.005 - 1.030    pH, Urine 7.0 5.0 - 8.0    Leukocytes Moderate (2+) (A) Negative    Nitrite, UA Negative Negative    Protein, POC Negative Negative mg/dL    Glucose, UA Negative Negative, 1000 mg/dL (3+) mg/dL    Ketones, UA Negative Negative    Urobilinogen, UA Normal Normal    Bilirubin Negative Negative    Blood, UA Trace (A) Negative   POC CBC   Result Value Ref Range    Hematocrit 40.4 38 - 51 %    Hemoglobin 13.5 12.0 - 17.0 g/dL    RBC 4.40     WBC 8.1     MCV 91.9     MCH 30.7     MCHC 33.3     RDW-CV 13.7     Platelets 258 10*3/mm3    MPV 6.9

## 2019-02-27 LAB
BACTERIA UR CULT: NORMAL
BACTERIA UR CULT: NORMAL

## 2019-03-04 ENCOUNTER — OFFICE VISIT (OUTPATIENT)
Dept: FAMILY MEDICINE CLINIC | Facility: CLINIC | Age: 74
End: 2019-03-04

## 2019-03-04 VITALS
HEART RATE: 100 BPM | TEMPERATURE: 97.9 F | WEIGHT: 150 LBS | DIASTOLIC BLOOD PRESSURE: 68 MMHG | SYSTOLIC BLOOD PRESSURE: 128 MMHG | BODY MASS INDEX: 24.99 KG/M2 | OXYGEN SATURATION: 93 % | HEIGHT: 65 IN

## 2019-03-04 DIAGNOSIS — K57.92 ACUTE DIVERTICULITIS: ICD-10-CM

## 2019-03-04 DIAGNOSIS — R53.82 CHRONIC FATIGUE: Primary | ICD-10-CM

## 2019-03-04 PROCEDURE — 99214 OFFICE O/P EST MOD 30 MIN: CPT | Performed by: NURSE PRACTITIONER

## 2019-03-04 RX ORDER — METRONIDAZOLE 500 MG/1
500 TABLET ORAL 3 TIMES DAILY
Qty: 30 TABLET | Refills: 0 | Status: SHIPPED | OUTPATIENT
Start: 2019-03-04 | End: 2019-07-06 | Stop reason: SDUPTHER

## 2019-03-04 NOTE — PROGRESS NOTES
Subjective     Gita Avelar is a 74 y.o. female who presents with   Chief Complaint   Patient presents with   • Diverticulitis       Diverticulitis   The current episode started in the past 7 days (last friday ). The problem occurs constantly. The problem has been gradually worsening. Associated symptoms include abdominal pain (radiating across lower abdomin), a change in bowel habit (constipation and rectum pain ), chills, a fever (100.0), urinary symptoms (not as much treated for a UTI last Monday ) and weakness. Pertinent negatives include no chest pain. Nothing aggravates the symptoms. She has tried nothing (prune juice and laxative ) for the symptoms. The treatment provided no relief.        Review of Systems   Constitutional: Positive for chills and fever (100.0).   Cardiovascular: Negative for chest pain.   Gastrointestinal: Positive for abdominal pain (radiating across lower abdomin) and change in bowel habit (constipation and rectum pain ).   Neurological: Positive for weakness.   All other systems reviewed and are negative.        The following portions of the patient's history were reviewed and updated as appropriate: allergies, current medications, past family history, past medical history, past social history, past surgical history and problem list.      Patient Active Problem List    Diagnosis Date Noted   • Atherosclerosis of both carotid arteries 01/09/2019   • Nipple discharge 04/13/2017   • Abnormal ultrasound of breast 04/13/2017   • Abnormal mammogram 04/13/2017   • Lump or mass in breast 04/13/2017   • Family history of malignant neoplasm of pancreas 04/13/2017   • Moderate episode of recurrent major depressive disorder (CMS/HCC) 11/29/2016   • Avitaminosis D 07/11/2016   • B12 deficiency 07/11/2016   • Near syncope 07/11/2016   • Atrophic vaginitis 07/11/2016   • Awareness of heartbeats 07/11/2016   • OP (osteoporosis) 07/11/2016   • HLD (hyperlipidemia) 07/11/2016   • Fatigue 07/11/2016  "  • Essential hypertension 07/11/2016       Current Outpatient Medications on File Prior to Visit   Medication Sig Dispense Refill   • albuterol (PROVENTIL HFA;VENTOLIN HFA) 108 (90 Base) MCG/ACT inhaler Inhale 2 puffs Every 4 (Four) Hours As Needed for Wheezing. 18 g 0   • aspirin 81 MG tablet Take 81 mg by mouth Daily.     • cholecalciferol (VITAMIN D3) 1000 UNITS tablet Take 1,000 Units by mouth Daily.     • ciprofloxacin (CIPRO) 500 MG tablet Take 1 tablet by mouth 2 (Two) Times a Day. 14 tablet 0   • clopidogrel (PLAVIX) 75 MG tablet      • escitalopram (LEXAPRO) 10 MG tablet TAKE 1 TABLET BY MOUTH ONCE DAILY 30 tablet 0   • hydrOXYzine (ATARAX) 25 MG tablet Take 1 tablet by mouth 3 (Three) Times a Day As Needed for Itching. 90 tablet 0   • pravastatin (PRAVACHOL) 40 MG tablet Take 1 tablet by mouth Daily for 90 days. 90 tablet 3     No current facility-administered medications on file prior to visit.        Objective     /68 (BP Location: Right arm, Patient Position: Sitting, Cuff Size: Adult)   Pulse 100   Temp 97.9 °F (36.6 °C) (Oral)   Ht 165.1 cm (65\")   Wt 68 kg (150 lb)   SpO2 93%   BMI 24.96 kg/m²     Physical Exam   Constitutional: She is oriented to person, place, and time. She appears well-developed and well-nourished.   Abdominal: Soft. Normal appearance. Bowel sounds are decreased. There is tenderness in the left lower quadrant.   Lymphadenopathy:     She has no cervical adenopathy.   Neurological: She is alert and oriented to person, place, and time.   Vitals reviewed.      Procedures    Assessment/Plan   Gita was seen today for diverticulitis.    Diagnoses and all orders for this visit:    Chronic fatigue        Discussion         No future appointments.      "

## 2019-03-29 DIAGNOSIS — F33.2 SEVERE EPISODE OF RECURRENT MAJOR DEPRESSIVE DISORDER, WITHOUT PSYCHOTIC FEATURES (HCC): ICD-10-CM

## 2019-03-29 RX ORDER — ESCITALOPRAM OXALATE 10 MG/1
TABLET ORAL
Qty: 30 TABLET | Refills: 2 | Status: SHIPPED | OUTPATIENT
Start: 2019-03-29 | End: 2019-07-10 | Stop reason: SDUPTHER

## 2019-04-17 DIAGNOSIS — F33.2 SEVERE EPISODE OF RECURRENT MAJOR DEPRESSIVE DISORDER, WITHOUT PSYCHOTIC FEATURES (HCC): ICD-10-CM

## 2019-04-17 RX ORDER — HYDROXYZINE HYDROCHLORIDE 25 MG/1
TABLET, FILM COATED ORAL
Qty: 90 TABLET | Refills: 0 | Status: SHIPPED | OUTPATIENT
Start: 2019-04-17 | End: 2020-01-22

## 2019-04-26 RX ORDER — LISINOPRIL 10 MG/1
TABLET ORAL
Qty: 30 TABLET | Refills: 0 | OUTPATIENT
Start: 2019-04-26

## 2019-04-29 ENCOUNTER — CLINICAL SUPPORT (OUTPATIENT)
Dept: FAMILY MEDICINE CLINIC | Facility: CLINIC | Age: 74
End: 2019-04-29

## 2019-04-29 VITALS
DIASTOLIC BLOOD PRESSURE: 68 MMHG | HEART RATE: 94 BPM | WEIGHT: 150 LBS | OXYGEN SATURATION: 92 % | HEIGHT: 65 IN | SYSTOLIC BLOOD PRESSURE: 108 MMHG | BODY MASS INDEX: 24.99 KG/M2

## 2019-04-29 RX ORDER — LISINOPRIL 10 MG/1
10 TABLET ORAL DAILY
Qty: 30 TABLET | Refills: 3 | Status: SHIPPED | OUTPATIENT
Start: 2019-04-29 | End: 2019-08-06 | Stop reason: SDUPTHER

## 2019-05-24 DIAGNOSIS — I10 ESSENTIAL HYPERTENSION: ICD-10-CM

## 2019-05-24 DIAGNOSIS — E55.9 VITAMIN D DEFICIENCY: ICD-10-CM

## 2019-05-24 DIAGNOSIS — E78.2 MIXED HYPERLIPIDEMIA: ICD-10-CM

## 2019-05-24 DIAGNOSIS — R53.82 CHRONIC FATIGUE: Primary | ICD-10-CM

## 2019-05-25 LAB
25(OH)D3+25(OH)D2 SERPL-MCNC: 80.8 NG/ML (ref 30–100)
ALBUMIN SERPL-MCNC: 3.7 G/DL (ref 3.5–5.2)
ALBUMIN/GLOB SERPL: 1.2 G/DL
ALP SERPL-CCNC: 102 U/L (ref 39–117)
ALT SERPL-CCNC: 13 U/L (ref 1–33)
AST SERPL-CCNC: 19 U/L (ref 1–32)
BASOPHILS # BLD AUTO: 0.05 10*3/MM3 (ref 0–0.2)
BASOPHILS NFR BLD AUTO: 1.1 % (ref 0–1.5)
BILIRUB SERPL-MCNC: 0.5 MG/DL (ref 0.2–1.2)
BUN SERPL-MCNC: 13 MG/DL (ref 8–23)
BUN/CREAT SERPL: 17.8 (ref 7–25)
CALCIUM SERPL-MCNC: 10 MG/DL (ref 8.6–10.5)
CHLORIDE SERPL-SCNC: 106 MMOL/L (ref 98–107)
CHOLEST SERPL-MCNC: 208 MG/DL (ref 0–200)
CO2 SERPL-SCNC: 27 MMOL/L (ref 22–29)
CREAT SERPL-MCNC: 0.73 MG/DL (ref 0.57–1)
EOSINOPHIL # BLD AUTO: 0.22 10*3/MM3 (ref 0–0.4)
EOSINOPHIL NFR BLD AUTO: 5 % (ref 0.3–6.2)
ERYTHROCYTE [DISTWIDTH] IN BLOOD BY AUTOMATED COUNT: 13.9 % (ref 12.3–15.4)
GLOBULIN SER CALC-MCNC: 3 GM/DL
GLUCOSE SERPL-MCNC: 93 MG/DL (ref 65–99)
HCT VFR BLD AUTO: 46 % (ref 34–46.6)
HDLC SERPL-MCNC: 48 MG/DL (ref 40–60)
HGB BLD-MCNC: 14.2 G/DL (ref 12–15.9)
IMM GRANULOCYTES # BLD AUTO: 0.01 10*3/MM3 (ref 0–0.05)
IMM GRANULOCYTES NFR BLD AUTO: 0.2 % (ref 0–0.5)
LDLC SERPL CALC-MCNC: 129 MG/DL (ref 0–100)
LYMPHOCYTES # BLD AUTO: 1.86 10*3/MM3 (ref 0.7–3.1)
LYMPHOCYTES NFR BLD AUTO: 42.1 % (ref 19.6–45.3)
MCH RBC QN AUTO: 29.2 PG (ref 26.6–33)
MCHC RBC AUTO-ENTMCNC: 30.9 G/DL (ref 31.5–35.7)
MCV RBC AUTO: 94.5 FL (ref 79–97)
MONOCYTES # BLD AUTO: 0.45 10*3/MM3 (ref 0.1–0.9)
MONOCYTES NFR BLD AUTO: 10.2 % (ref 5–12)
NEUTROPHILS # BLD AUTO: 1.83 10*3/MM3 (ref 1.7–7)
NEUTROPHILS NFR BLD AUTO: 41.4 % (ref 42.7–76)
NRBC BLD AUTO-RTO: 0 /100 WBC (ref 0–0.2)
PLATELET # BLD AUTO: 247 10*3/MM3 (ref 140–450)
POTASSIUM SERPL-SCNC: 4 MMOL/L (ref 3.5–5.2)
PROT SERPL-MCNC: 6.7 G/DL (ref 6–8.5)
RBC # BLD AUTO: 4.87 10*6/MM3 (ref 3.77–5.28)
SODIUM SERPL-SCNC: 144 MMOL/L (ref 136–145)
TRIGL SERPL-MCNC: 156 MG/DL (ref 0–150)
TSH SERPL DL<=0.005 MIU/L-ACNC: 4.26 MIU/ML (ref 0.27–4.2)
VIT B12 SERPL-MCNC: 1459 PG/ML (ref 211–946)
VLDLC SERPL CALC-MCNC: 31.2 MG/DL
WBC # BLD AUTO: 4.42 10*3/MM3 (ref 3.4–10.8)

## 2019-05-26 NOTE — PROGRESS NOTES
Please call the patient regarding her abnormal result. Cholesterol is elevated and thyroid is abnormal, follow up in the office

## 2019-05-31 ENCOUNTER — OFFICE VISIT (OUTPATIENT)
Dept: FAMILY MEDICINE CLINIC | Facility: CLINIC | Age: 74
End: 2019-05-31

## 2019-05-31 VITALS
TEMPERATURE: 98.8 F | DIASTOLIC BLOOD PRESSURE: 64 MMHG | WEIGHT: 151 LBS | OXYGEN SATURATION: 99 % | BODY MASS INDEX: 25.16 KG/M2 | SYSTOLIC BLOOD PRESSURE: 118 MMHG | HEIGHT: 65 IN | HEART RATE: 85 BPM

## 2019-05-31 DIAGNOSIS — I10 ESSENTIAL HYPERTENSION: ICD-10-CM

## 2019-05-31 DIAGNOSIS — S30.0XXA CONTUSION OF SACRUM, INITIAL ENCOUNTER: ICD-10-CM

## 2019-05-31 DIAGNOSIS — E78.2 MIXED HYPERLIPIDEMIA: Primary | ICD-10-CM

## 2019-05-31 PROCEDURE — 99214 OFFICE O/P EST MOD 30 MIN: CPT | Performed by: NURSE PRACTITIONER

## 2019-05-31 RX ORDER — PRAVASTATIN SODIUM 40 MG
40 TABLET ORAL DAILY
Qty: 90 TABLET | Refills: 3 | Status: SHIPPED | OUTPATIENT
Start: 2019-05-31 | End: 2019-08-29

## 2019-05-31 RX ORDER — CHOLECALCIFEROL (VITAMIN D3) 25 MCG
TABLET,CHEWABLE ORAL
COMMUNITY
End: 2021-08-23

## 2019-05-31 NOTE — PROGRESS NOTES
Subjective   Gita Avelar is a 74 y.o. female who presents for a follow up on hyperlipidemia, hypertension. Had a fall on Monday.     Hyperlipidemia   This is a chronic problem. The current episode started more than 1 year ago. The problem is resistant. Recent lipid tests were reviewed and are high. Factors aggravating her hyperlipidemia include smoking. Associated symptoms include leg pain. Pertinent negatives include no chest pain, focal sensory loss or shortness of breath. Current antihyperlipidemic treatment includes statins (tolerating pravastatin better than crestor). The current treatment provides mild improvement of lipids. Compliance problems include medication side effects.  Risk factors for coronary artery disease include hypertension and post-menopausal.   Hypertension   This is a chronic problem. The current episode started more than 1 year ago. The problem is unchanged. The problem is controlled. Pertinent negatives include no chest pain or shortness of breath. There are no associated agents to hypertension. Risk factors for coronary artery disease include dyslipidemia, post-menopausal state and smoking/tobacco exposure. Past treatments include ACE inhibitors. Current antihypertension treatment includes ACE inhibitors. The current treatment provides significant improvement. There is no history of angina or CAD/MI.      Fell secondary to twisted feet under her, not dizzy or anything. Landed on buttocks and is sore.   The following portions of the patient's history were reviewed and updated as appropriate: allergies, current medications, past family history, past medical history, past social history, past surgical history and problem list.    Review of Systems   Respiratory: Negative for shortness of breath.    Cardiovascular: Negative for chest pain.   Musculoskeletal: Positive for back pain. Negative for gait problem.   Skin: Negative.      /64   Pulse 85   Temp 98.8 °F (37.1 °C) (Oral)    "Ht 165.1 cm (65\")   Wt 68.5 kg (151 lb)   SpO2 99%   BMI 25.13 kg/m²     Objective   Physical Exam   Constitutional: She appears well-developed and well-nourished.   Neck: Normal range of motion. Neck supple. No thyromegaly present.   Cardiovascular: Normal rate, regular rhythm and normal heart sounds.   Pulmonary/Chest: Effort normal and breath sounds normal.   Musculoskeletal:        Lumbar back: She exhibits pain. She exhibits no tenderness, no bony tenderness and no spasm.        Back:    Lymphadenopathy:     She has no cervical adenopathy.   Skin: Skin is warm and dry.   Psychiatric: She has a normal mood and affect. Her behavior is normal. Judgment and thought content normal.   Nursing note and vitals reviewed.    Assessment/Plan   Problems Addressed this Visit        Cardiovascular and Mediastinum    HLD (hyperlipidemia) - Primary    Relevant Medications    pravastatin (PRAVACHOL) 40 MG tablet    Essential hypertension      Other Visit Diagnoses     Contusion of sacrum, initial encounter            HLD--continue pravastatin as tolerated better than crestor  HTN--continue ACE  Sacral contusion--Alternate ice and heat. FU if not settling in 2 weeks.          "

## 2019-07-08 RX ORDER — METRONIDAZOLE 500 MG/1
TABLET ORAL
Qty: 30 TABLET | Refills: 0 | Status: SHIPPED | OUTPATIENT
Start: 2019-07-08 | End: 2020-01-22

## 2019-07-10 DIAGNOSIS — F33.2 SEVERE EPISODE OF RECURRENT MAJOR DEPRESSIVE DISORDER, WITHOUT PSYCHOTIC FEATURES (HCC): ICD-10-CM

## 2019-07-10 RX ORDER — ESCITALOPRAM OXALATE 10 MG/1
TABLET ORAL
Qty: 30 TABLET | Refills: 2 | Status: SHIPPED | OUTPATIENT
Start: 2019-07-10 | End: 2019-11-23 | Stop reason: SDUPTHER

## 2019-08-06 RX ORDER — LISINOPRIL 10 MG/1
10 TABLET ORAL DAILY
Qty: 30 TABLET | Refills: 3 | Status: SHIPPED | OUTPATIENT
Start: 2019-08-06 | End: 2019-12-26 | Stop reason: DRUGHIGH

## 2019-11-14 ENCOUNTER — TELEPHONE (OUTPATIENT)
Dept: FAMILY MEDICINE CLINIC | Facility: CLINIC | Age: 74
End: 2019-11-14

## 2019-11-14 NOTE — TELEPHONE ENCOUNTER
Increase lexapro to 20mg daily #30 0rf, will need to make hospital follow up to certify home health

## 2019-11-14 NOTE — TELEPHONE ENCOUNTER
Patient says she broke hip 9/30/19. She has been in rehab for a while but is now home. She currently has home health through [facility] and is doing PT. She is very anxious about falling and  nurse says this is slowing down her progress. She advised pt to contact PCP and ask for a medication to help with this anxiety. She is home bound and not able to come in.

## 2019-11-23 DIAGNOSIS — F33.2 SEVERE EPISODE OF RECURRENT MAJOR DEPRESSIVE DISORDER, WITHOUT PSYCHOTIC FEATURES (HCC): ICD-10-CM

## 2019-11-25 RX ORDER — ESCITALOPRAM OXALATE 10 MG/1
TABLET ORAL
Qty: 90 TABLET | Refills: 0 | Status: SHIPPED | OUTPATIENT
Start: 2019-11-25 | End: 2020-01-22 | Stop reason: SDUPTHER

## 2019-12-26 ENCOUNTER — OFFICE VISIT (OUTPATIENT)
Dept: FAMILY MEDICINE CLINIC | Facility: CLINIC | Age: 74
End: 2019-12-26

## 2019-12-26 VITALS
HEART RATE: 98 BPM | OXYGEN SATURATION: 98 % | BODY MASS INDEX: 23.66 KG/M2 | HEIGHT: 65 IN | TEMPERATURE: 98.4 F | SYSTOLIC BLOOD PRESSURE: 144 MMHG | DIASTOLIC BLOOD PRESSURE: 86 MMHG | WEIGHT: 142 LBS

## 2019-12-26 DIAGNOSIS — I10 ESSENTIAL HYPERTENSION: Primary | ICD-10-CM

## 2019-12-26 PROCEDURE — 99214 OFFICE O/P EST MOD 30 MIN: CPT | Performed by: NURSE PRACTITIONER

## 2019-12-26 RX ORDER — FLUTICASONE PROPIONATE 50 MCG
2 SPRAY, SUSPENSION (ML) NASAL DAILY
Qty: 9.9 ML | Refills: 0 | Status: SHIPPED | OUTPATIENT
Start: 2019-12-26 | End: 2020-01-22

## 2019-12-26 RX ORDER — PRAVASTATIN SODIUM 40 MG
40 TABLET ORAL DAILY
COMMUNITY
End: 2020-01-07 | Stop reason: SDUPTHER

## 2019-12-26 RX ORDER — CELECOXIB 200 MG/1
200 CAPSULE ORAL DAILY
COMMUNITY
End: 2021-08-23

## 2019-12-26 RX ORDER — LISINOPRIL 20 MG/1
20 TABLET ORAL DAILY
Qty: 30 TABLET | Refills: 1 | Status: SHIPPED | OUTPATIENT
Start: 2019-12-26 | End: 2020-01-22 | Stop reason: SDUPTHER

## 2019-12-26 NOTE — PROGRESS NOTES
Subjective     Gita Avelar is a 74 y.o. female who presents with   Chief Complaint   Patient presents with   • Hypertension     blood pressure has been elevated at home       BP: uncontrolled at this time yesterday /83 this am blood pressure waxing and waning occasional reading of 150/90 161/94 153/74 BP all over the place denies any visual changes vertigo lightheadedness or shortness of breath. Has some pain in the hip at times   Broke Hip in September rehab for one month.    Hypertension   This is a chronic problem. The current episode started more than 1 year ago. The problem has been waxing and waning since onset. The problem is uncontrolled. Associated symptoms include headaches. Pertinent negatives include no blurred vision, chest pain, malaise/fatigue, orthopnea, palpitations, PND or shortness of breath. There are no associated agents to hypertension. The current treatment provides no improvement.        Review of Systems   Constitutional: Negative.  Negative for activity change, fatigue, fever, malaise/fatigue and unexpected weight change.   Eyes: Negative.  Negative for blurred vision and visual disturbance.   Respiratory: Negative.  Negative for shortness of breath.    Cardiovascular: Negative.  Negative for chest pain, palpitations, orthopnea and PND.   Gastrointestinal: Negative.  Negative for constipation and diarrhea.   Endocrine: Negative.    Genitourinary: Negative for difficulty urinating and frequency.   Musculoskeletal: Negative.    Neurological: Positive for headaches. Negative for weakness.   Hematological: Negative.    Psychiatric/Behavioral: Negative.  Negative for dysphoric mood.   All other systems reviewed and are negative.        The following portions of the patient's history were reviewed and updated as appropriate: allergies, current medications, past family history, past medical history, past social history, past surgical history and problem list.      Patient Active Problem  "List    Diagnosis Date Noted   • Atherosclerosis of both carotid arteries 01/09/2019   • Nipple discharge 04/13/2017   • Abnormal ultrasound of breast 04/13/2017   • Abnormal mammogram 04/13/2017   • Lump or mass in breast 04/13/2017   • Family history of malignant neoplasm of pancreas 04/13/2017   • Moderate episode of recurrent major depressive disorder (CMS/HCC) 11/29/2016   • Avitaminosis D 07/11/2016   • B12 deficiency 07/11/2016   • Near syncope 07/11/2016   • Atrophic vaginitis 07/11/2016   • Awareness of heartbeats 07/11/2016   • OP (osteoporosis) 07/11/2016   • HLD (hyperlipidemia) 07/11/2016   • Fatigue 07/11/2016   • Essential hypertension 07/11/2016       Current Outpatient Medications on File Prior to Visit   Medication Sig Dispense Refill   • albuterol (PROVENTIL HFA;VENTOLIN HFA) 108 (90 Base) MCG/ACT inhaler Inhale 2 puffs Every 4 (Four) Hours As Needed for Wheezing. 18 g 0   • aspirin 81 MG tablet Take 81 mg by mouth Daily.     • celecoxib (CeleBREX) 200 MG capsule Take 200 mg by mouth Daily.     • cholecalciferol (VITAMIN D3) 1000 UNITS tablet Take 1,000 Units by mouth Daily.     • Cyanocobalamin (B-12) 1000 MCG capsule Take  by mouth.     • escitalopram (LEXAPRO) 10 MG tablet TAKE 1 TABLET BY MOUTH ONCE DAILY 90 tablet 0   • Multiple Vitamins-Minerals (PRESERVISION AREDS PO) Take  by mouth.     • pravastatin (PRAVACHOL) 40 MG tablet Take 40 mg by mouth Daily.     • hydrOXYzine (ATARAX) 25 MG tablet TAKE 1 TABLET BY MOUTH THREE TIMES DAILY AS NEEDED FOR  ITCHING 90 tablet 0   • metroNIDAZOLE (FLAGYL) 500 MG tablet TAKE 1 TABLET BY MOUTH THREE TIMES DAILY FOR 10 DAYS 30 tablet 0     No current facility-administered medications on file prior to visit.        Objective     /86   Pulse 98   Temp 98.4 °F (36.9 °C) (Oral)   Ht 165.1 cm (65\")   Wt 64.4 kg (142 lb)   SpO2 98%   BMI 23.63 kg/m²     Physical Exam   Constitutional: She appears well-developed and well-nourished.   Eyes: Pupils are " equal, round, and reactive to light. Conjunctivae are normal.   Neck: Normal range of motion.   Cardiovascular: Normal rate, regular rhythm and normal heart sounds.   Pulmonary/Chest: Effort normal and breath sounds normal. No respiratory distress.   Abdominal: Bowel sounds are normal. She exhibits no distension and no mass. There is no tenderness. There is no rebound and no guarding.   Skin: Skin is warm and dry.   Psychiatric: She has a normal mood and affect.       Procedures    Assessment/Plan   Gita was seen today for hypertension.    Diagnoses and all orders for this visit:    Essential hypertension  -     lisinopril (PRINIVIL,ZESTRIL) 20 MG tablet; Take 1 tablet by mouth Daily.    Other orders  -     fluticasone (FLONASE) 50 MCG/ACT nasal spray; 2 sprays into the nostril(s) as directed by provider Daily for 30 days. Administer 2 sprays in each nostril for each dose.        Discussion  HTN : BP BID for the next 2 wks and then follow up        No future appointments.

## 2020-01-07 RX ORDER — PRAVASTATIN SODIUM 40 MG
40 TABLET ORAL DAILY
Qty: 90 TABLET | Refills: 0 | Status: SHIPPED | OUTPATIENT
Start: 2020-01-07 | End: 2021-09-23

## 2020-01-22 ENCOUNTER — OFFICE VISIT (OUTPATIENT)
Dept: FAMILY MEDICINE CLINIC | Facility: CLINIC | Age: 75
End: 2020-01-22

## 2020-01-22 VITALS
HEIGHT: 65 IN | SYSTOLIC BLOOD PRESSURE: 138 MMHG | BODY MASS INDEX: 23.49 KG/M2 | OXYGEN SATURATION: 98 % | DIASTOLIC BLOOD PRESSURE: 80 MMHG | WEIGHT: 141 LBS | HEART RATE: 80 BPM | TEMPERATURE: 97.9 F

## 2020-01-22 DIAGNOSIS — F33.1 MODERATE EPISODE OF RECURRENT MAJOR DEPRESSIVE DISORDER (HCC): ICD-10-CM

## 2020-01-22 DIAGNOSIS — F33.2 SEVERE EPISODE OF RECURRENT MAJOR DEPRESSIVE DISORDER, WITHOUT PSYCHOTIC FEATURES (HCC): ICD-10-CM

## 2020-01-22 DIAGNOSIS — I10 ESSENTIAL HYPERTENSION: Primary | ICD-10-CM

## 2020-01-22 DIAGNOSIS — M80.00XS AGE-RELATED OSTEOPOROSIS WITH CURRENT PATHOLOGICAL FRACTURE, SEQUELA: ICD-10-CM

## 2020-01-22 DIAGNOSIS — K21.9 GASTROESOPHAGEAL REFLUX DISEASE WITHOUT ESOPHAGITIS: ICD-10-CM

## 2020-01-22 PROCEDURE — 99214 OFFICE O/P EST MOD 30 MIN: CPT | Performed by: NURSE PRACTITIONER

## 2020-01-22 RX ORDER — LISINOPRIL 30 MG/1
30 TABLET ORAL DAILY
Qty: 90 TABLET | Refills: 1 | Status: SHIPPED | OUTPATIENT
Start: 2020-01-22 | End: 2021-08-23

## 2020-01-22 RX ORDER — PANTOPRAZOLE SODIUM 40 MG/1
40 TABLET, DELAYED RELEASE ORAL DAILY
Qty: 30 TABLET | Refills: 0 | Status: SHIPPED | OUTPATIENT
Start: 2020-01-22 | End: 2021-08-23

## 2020-01-22 RX ORDER — ESCITALOPRAM OXALATE 20 MG/1
20 TABLET ORAL DAILY
Qty: 90 TABLET | Refills: 1 | Status: SHIPPED | OUTPATIENT
Start: 2020-01-22 | End: 2021-09-22

## 2020-01-22 NOTE — PROGRESS NOTES
"Subjective   Gita Avelar is a 75 y.o. female who presents for a follow up on hypertension. Blood pressure is still elevated.     History of Present Illness   Saw Erica on 12/26 with reports of high blood pressure since September in the hospital and metoprolol was added in the hospital. But would stand up and feel like bottom out. Was sent home with metoprolol but quit taking about 1 month ago and was running high since.140--180/  Recently broke hip, broke and then fell. Still some pain, but getting around better. Taking tylenol for the pain  Doing much better with depression and anxiety and depression  Acid reflux since hospitalization, throwing up at times. Has tried prilosec and ranitidine to manage, but still severe. No abdominal pain beyond constipation.  Has had recent bone density--to discuss with ortho next month.  The following portions of the patient's history were reviewed and updated as appropriate: allergies, current medications, past family history, past medical history, past social history, past surgical history and problem list.    Review of Systems   Constitutional: Negative for activity change, appetite change, fatigue, unexpected weight gain and unexpected weight loss.   Respiratory: Negative.  Negative for shortness of breath.    Cardiovascular: Negative.  Negative for chest pain, palpitations and leg swelling.   Gastrointestinal: Positive for nausea, vomiting, GERD and indigestion. Negative for abdominal pain.   Musculoskeletal: Positive for arthralgias and gait problem.   Skin: Negative.    Neurological: Positive for dizziness. Negative for syncope and headache.   Hematological: Negative.    Psychiatric/Behavioral: Positive for stress. Negative for decreased concentration, sleep disturbance, suicidal ideas and depressed mood. The patient is nervous/anxious.      /80   Pulse 80   Temp 97.9 °F (36.6 °C) (Oral)   Ht 165.1 cm (65\")   Wt 64 kg (141 lb)   SpO2 98%   BMI 23.46 " kg/m²     Objective   Physical Exam   Constitutional: She appears well-developed and well-nourished.   HENT:   Right Ear: Tympanic membrane normal.   Left Ear: A middle ear effusion is present.   Nose: Rhinorrhea present. No mucosal edema. Right sinus exhibits no maxillary sinus tenderness and no frontal sinus tenderness. Left sinus exhibits no maxillary sinus tenderness and no frontal sinus tenderness.   Neck: Normal range of motion. Neck supple. No thyromegaly present.   Cardiovascular: Normal rate, regular rhythm and normal heart sounds.   Pulmonary/Chest: Effort normal and breath sounds normal.   Abdominal: Soft. Bowel sounds are normal. She exhibits no distension and no mass. There is no tenderness. There is no rebound and no guarding.   Musculoskeletal: She exhibits no edema or tenderness.   Lymphadenopathy:     She has no cervical adenopathy.   Skin: Skin is warm and dry.   Psychiatric: She has a normal mood and affect. Her behavior is normal. Judgment and thought content normal.   Nursing note and vitals reviewed.    Assessment/Plan   Problems Addressed this Visit        Cardiovascular and Mediastinum    Essential hypertension - Primary    Relevant Medications    lisinopril (PRINIVIL,ZESTRIL) 30 MG tablet    Other Relevant Orders    Comprehensive Metabolic Panel (Completed)       Musculoskeletal and Integument    OP (osteoporosis)       Other    Moderate episode of recurrent major depressive disorder (CMS/HCC)    Relevant Medications    escitalopram (LEXAPRO) 20 MG tablet      Other Visit Diagnoses     Severe episode of recurrent major depressive disorder, 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)        Relevant Medications    escitalopram (LEXAPRO) 20 MG tablet    Gastroesophageal reflux disease without esophagitis        Relevant Medications    pantoprazole (PROTONIX) 40 MG EC tablet        Discussed PPI hard on bones, ok for short term only. Will start protonix. Reviewed last bone density and was osteoporotic  [id number]. Would be candidate for   HTN--to control update labs and continue meds  Osteoprosis--last Dexa at 2015 with -2.7 at femoral neck  Depression--continue lexapro--feeling isolated with injury offered changes, but declines  GERD--rotate to protonix, as insufficient control on omeprazole in the past. GI referral if not settling 1 month

## 2020-01-23 LAB
ALBUMIN SERPL-MCNC: 4 G/DL (ref 3.7–4.7)
ALBUMIN/GLOB SERPL: 1.3 {RATIO} (ref 1.2–2.2)
ALP SERPL-CCNC: 107 IU/L (ref 39–117)
ALT SERPL-CCNC: 7 IU/L (ref 0–32)
AST SERPL-CCNC: 20 IU/L (ref 0–40)
BILIRUB SERPL-MCNC: 0.4 MG/DL (ref 0–1.2)
BUN SERPL-MCNC: 17 MG/DL (ref 8–27)
BUN/CREAT SERPL: 21 (ref 12–28)
CALCIUM SERPL-MCNC: 9.6 MG/DL (ref 8.7–10.3)
CHLORIDE SERPL-SCNC: 102 MMOL/L (ref 96–106)
CO2 SERPL-SCNC: 20 MMOL/L (ref 20–29)
CREAT SERPL-MCNC: 0.8 MG/DL (ref 0.57–1)
GLOBULIN SER CALC-MCNC: 3.1 G/DL (ref 1.5–4.5)
GLUCOSE SERPL-MCNC: 89 MG/DL (ref 65–99)
POTASSIUM SERPL-SCNC: 4.6 MMOL/L (ref 3.5–5.2)
PROT SERPL-MCNC: 7.1 G/DL (ref 6–8.5)
SODIUM SERPL-SCNC: 143 MMOL/L (ref 134–144)

## 2020-04-09 ENCOUNTER — TELEPHONE (OUTPATIENT)
Dept: FAMILY MEDICINE CLINIC | Facility: CLINIC | Age: 75
End: 2020-04-09

## 2020-04-09 RX ORDER — CEPHALEXIN 500 MG/1
500 CAPSULE ORAL 2 TIMES DAILY
Qty: 10 CAPSULE | Refills: 0 | Status: SHIPPED | OUTPATIENT
Start: 2020-04-09 | End: 2021-08-23

## 2020-04-09 NOTE — TELEPHONE ENCOUNTER
Ok for cephalexin 500mg po BID #10 0rf, FU U/A and culture if symptoms not improving, if any fever or back pain increasing, would seek care.

## 2020-04-20 ENCOUNTER — TELEPHONE (OUTPATIENT)
Dept: FAMILY MEDICINE CLINIC | Facility: CLINIC | Age: 75
End: 2020-04-20

## 2020-06-29 RX ORDER — CEPHALEXIN 500 MG/1
CAPSULE ORAL
Qty: 10 CAPSULE | Refills: 0 | OUTPATIENT
Start: 2020-06-29

## 2020-07-02 ENCOUNTER — TELEPHONE (OUTPATIENT)
Dept: FAMILY MEDICINE CLINIC | Facility: CLINIC | Age: 75
End: 2020-07-02

## 2020-07-02 DIAGNOSIS — E55.9 VITAMIN D DEFICIENCY: Primary | ICD-10-CM

## 2020-07-02 DIAGNOSIS — N39.0 ACUTE UTI: ICD-10-CM

## 2020-07-02 DIAGNOSIS — I10 ESSENTIAL HYPERTENSION: ICD-10-CM

## 2020-07-02 DIAGNOSIS — R53.82 CHRONIC FATIGUE: ICD-10-CM

## 2020-07-02 DIAGNOSIS — R53.83 FATIGUE, UNSPECIFIED TYPE: ICD-10-CM

## 2020-07-02 RX ORDER — CEPHALEXIN 500 MG/1
500 CAPSULE ORAL 2 TIMES DAILY
Qty: 14 CAPSULE | Refills: 0 | Status: SHIPPED | OUTPATIENT
Start: 2020-07-02 | End: 2021-08-23

## 2021-08-20 ENCOUNTER — TELEPHONE (OUTPATIENT)
Dept: FAMILY MEDICINE CLINIC | Facility: CLINIC | Age: 76
End: 2021-08-20

## 2021-08-20 NOTE — TELEPHONE ENCOUNTER
Caller: Gita Avelar    Relationship to patient: Self    Best call back number: [phone]    Patient is needing: PATIENT STATES SHE HAS A UTI AND WOULD LIKE TO BE SEEN Monday 8-23-21 (DOES NOT HAVE TRANSPORTATION UNTIL THEN).     PLEASE ADVISE

## 2021-08-23 ENCOUNTER — OFFICE VISIT (OUTPATIENT)
Dept: FAMILY MEDICINE CLINIC | Facility: CLINIC | Age: 76
End: 2021-08-23

## 2021-08-23 VITALS
OXYGEN SATURATION: 98 % | TEMPERATURE: 97.5 F | DIASTOLIC BLOOD PRESSURE: 82 MMHG | WEIGHT: 139 LBS | HEIGHT: 65 IN | BODY MASS INDEX: 23.16 KG/M2 | HEART RATE: 74 BPM | SYSTOLIC BLOOD PRESSURE: 128 MMHG

## 2021-08-23 DIAGNOSIS — Z13.220 LIPID SCREENING: ICD-10-CM

## 2021-08-23 DIAGNOSIS — M80.00XS AGE-RELATED OSTEOPOROSIS WITH CURRENT PATHOLOGICAL FRACTURE, SEQUELA: ICD-10-CM

## 2021-08-23 DIAGNOSIS — E55.9 VITAMIN D DEFICIENCY: ICD-10-CM

## 2021-08-23 DIAGNOSIS — I10 ESSENTIAL HYPERTENSION: ICD-10-CM

## 2021-08-23 DIAGNOSIS — R53.82 CHRONIC FATIGUE: ICD-10-CM

## 2021-08-23 DIAGNOSIS — N30.00 ACUTE CYSTITIS WITHOUT HEMATURIA: Primary | ICD-10-CM

## 2021-08-23 LAB
BILIRUB BLD-MCNC: ABNORMAL MG/DL
CLARITY, POC: CLEAR
COLOR UR: YELLOW
GLUCOSE UR STRIP-MCNC: NEGATIVE MG/DL
KETONES UR QL: NEGATIVE
LEUKOCYTE EST, POC: ABNORMAL
NITRITE UR-MCNC: NEGATIVE MG/ML
PH UR: 5.5 [PH] (ref 5–8)
PROT UR STRIP-MCNC: ABNORMAL MG/DL
RBC # UR STRIP: NEGATIVE /UL
SP GR UR: 1.03 (ref 1–1.03)
UROBILINOGEN UR QL: NORMAL

## 2021-08-23 PROCEDURE — 99214 OFFICE O/P EST MOD 30 MIN: CPT | Performed by: NURSE PRACTITIONER

## 2021-08-23 PROCEDURE — 81003 URINALYSIS AUTO W/O SCOPE: CPT | Performed by: NURSE PRACTITIONER

## 2021-08-23 RX ORDER — DULOXETIN HYDROCHLORIDE 20 MG/1
20 CAPSULE, DELAYED RELEASE ORAL DAILY
COMMUNITY
End: 2021-08-23

## 2021-08-23 RX ORDER — CARVEDILOL 6.25 MG/1
6.25 TABLET ORAL 2 TIMES DAILY WITH MEALS
COMMUNITY
End: 2021-10-22 | Stop reason: SDUPTHER

## 2021-08-23 RX ORDER — FLUCONAZOLE 150 MG/1
150 TABLET ORAL ONCE
Qty: 2 TABLET | Refills: 0 | Status: SHIPPED | OUTPATIENT
Start: 2021-08-23 | End: 2021-08-23

## 2021-08-23 RX ORDER — AMLODIPINE BESYLATE 2.5 MG/1
2.5 TABLET ORAL DAILY
COMMUNITY
End: 2021-09-27

## 2021-08-23 RX ORDER — CEPHALEXIN 500 MG/1
500 CAPSULE ORAL 2 TIMES DAILY
Qty: 10 CAPSULE | Refills: 0 | Status: SHIPPED | OUTPATIENT
Start: 2021-08-23 | End: 2021-09-22

## 2021-08-23 RX ORDER — LISINOPRIL 40 MG/1
40 TABLET ORAL DAILY
COMMUNITY

## 2021-08-23 NOTE — PROGRESS NOTES
"Subjective   Gita Avelar is a 76 y.o. female who presents c/o dysuria x 1 week. Had moved away for 1 year but is now back home and moving into assisted living.     History of Present Illness   10 days symptoms. Moving back from [place]  Anxiety is increased secondary to daughter in law. Did not work out.   Sticky urethral discharge.   Had retinal detachment in October with surgery. No longer driving.  Breaking hip 3 yrs ago, really affected her well being. Did not feel cared for by family.  Would like to eliminate some medications  The following portions of the patient's history were reviewed and updated as appropriate: allergies, current medications, past family history, past medical history, past social history, past surgical history and problem list.    Review of Systems   Constitutional: Negative for activity change, chills and fever.   Eyes: Positive for visual disturbance.   Respiratory: Negative.    Cardiovascular: Negative.    Gastrointestinal: Negative for abdominal distention, abdominal pain and vomiting.   Endocrine: Negative.    Genitourinary: Positive for dysuria, frequency and hematuria. Negative for difficulty urinating, flank pain, genital sores and urgency.   Musculoskeletal: Positive for arthralgias and neck pain.   Skin: Negative.    Neurological: Negative.    Psychiatric/Behavioral: Positive for dysphoric mood and sleep disturbance. Negative for self-injury and suicidal ideas. The patient is nervous/anxious.      /82   Pulse 74   Temp 97.5 °F (36.4 °C) (Infrared)   Ht 165.1 cm (65\")   Wt 63 kg (139 lb)   SpO2 98%   BMI 23.13 kg/m²     Objective   Physical Exam  Vitals and nursing note reviewed.   Constitutional:       Appearance: She is not toxic-appearing.   HENT:      Right Ear: Tympanic membrane normal.      Left Ear: Tympanic membrane normal.   Cardiovascular:      Rate and Rhythm: Normal rate and regular rhythm.      Pulses: Normal pulses.      Heart sounds: Normal heart sounds. "   Pulmonary:      Effort: Pulmonary effort is normal.      Breath sounds: Normal breath sounds.   Abdominal:      Tenderness: There is no right CVA tenderness or left CVA tenderness.   Musculoskeletal:         General: No swelling.      Cervical back: No rigidity.   Skin:     General: Skin is warm and dry.   Neurological:      General: No focal deficit present.      Mental Status: She is alert.   Psychiatric:         Attention and Perception: Attention and perception normal.         Mood and Affect: Mood and affect normal.         Speech: Speech normal.         Behavior: Behavior normal. Behavior is cooperative.         Thought Content: Thought content normal.         Cognition and Memory: Cognition normal.       Assessment/Plan   Problems Addressed this Visit        Cardiac and Vasculature    Essential hypertension    Relevant Medications    amLODIPine (NORVASC) 2.5 MG tablet    carvedilol (COREG) 6.25 MG tablet    lisinopril (PRINIVIL,ZESTRIL) 40 MG tablet    Other Relevant Orders    CBC (No Diff)    Comprehensive Metabolic Panel       Musculoskeletal and Injuries    OP (osteoporosis)       Symptoms and Signs    Fatigue    Relevant Orders    Vitamin B12    TSH      Other Visit Diagnoses     Acute cystitis without hematuria    -  Primary    Relevant Medications    cephalexin (Keflex) 500 MG capsule    Other Relevant Orders    POCT urinalysis dipstick, automated (Completed)    Urine Culture - Urine, Urine, Clean Catch    Vitamin D deficiency        Relevant Orders    Vitamin D 25 Hydroxy    Lipid screening        Relevant Orders    Lipid Panel      Diagnoses       Codes Comments    Acute cystitis without hematuria    -  Primary ICD-10-CM: N30.00  ICD-9-CM: 595.0     Vitamin D deficiency     ICD-10-CM: E55.9  ICD-9-CM: 268.9     Chronic fatigue     ICD-10-CM: R53.82  ICD-9-CM: 780.79     Essential hypertension     ICD-10-CM: I10  ICD-9-CM: 401.9     Age-related osteoporosis with current pathological fracture, sequela      ICD-10-CM: M80.00XS  ICD-9-CM: 905.5, 733.01     Lipid screening     ICD-10-CM: Z13.220  ICD-9-CM: V77.91         Will try stopping amlodipine and cymbalta and get BP check and labs in 1 month. Treat UTI with keflex. Recommend bone density secondary to Fx history    Results for orders placed or performed in visit on 08/23/21   POCT urinalysis dipstick, automated    Specimen: Urine   Result Value Ref Range    Color Yellow Yellow, Straw, Dark Yellow, Monet    Clarity, UA Clear Clear    Specific Gravity  1.030 1.005 - 1.030    pH, Urine 5.5 5.0 - 8.0    Leukocytes Trace (A) Negative    Nitrite, UA Negative Negative    Protein, POC 30 mg/dL (A) Negative mg/dL    Glucose, UA Negative Negative, 1000 mg/dL (3+) mg/dL    Ketones, UA Negative Negative    Urobilinogen, UA Normal Normal    Bilirubin Small (1+) (A) Negative    Blood, UA Negative Negative

## 2021-08-25 LAB
BACTERIA UR CULT: NORMAL
BACTERIA UR CULT: NORMAL

## 2021-09-22 ENCOUNTER — OFFICE VISIT (OUTPATIENT)
Dept: FAMILY MEDICINE CLINIC | Facility: CLINIC | Age: 76
End: 2021-09-22

## 2021-09-22 VITALS
BODY MASS INDEX: 23.16 KG/M2 | TEMPERATURE: 96.9 F | DIASTOLIC BLOOD PRESSURE: 74 MMHG | OXYGEN SATURATION: 99 % | SYSTOLIC BLOOD PRESSURE: 122 MMHG | HEIGHT: 65 IN | WEIGHT: 139 LBS | HEART RATE: 70 BPM

## 2021-09-22 DIAGNOSIS — F33.2 SEVERE EPISODE OF RECURRENT MAJOR DEPRESSIVE DISORDER, WITHOUT PSYCHOTIC FEATURES (HCC): ICD-10-CM

## 2021-09-22 DIAGNOSIS — Z12.31 BREAST CANCER SCREENING BY MAMMOGRAM: ICD-10-CM

## 2021-09-22 DIAGNOSIS — M80.00XS AGE-RELATED OSTEOPOROSIS WITH CURRENT PATHOLOGICAL FRACTURE, SEQUELA: ICD-10-CM

## 2021-09-22 DIAGNOSIS — R07.2 PRECORDIAL PAIN: Primary | ICD-10-CM

## 2021-09-22 DIAGNOSIS — I10 ESSENTIAL HYPERTENSION: ICD-10-CM

## 2021-09-22 DIAGNOSIS — E55.9 VITAMIN D DEFICIENCY: ICD-10-CM

## 2021-09-22 DIAGNOSIS — Z11.59 NEED FOR HEPATITIS C SCREENING TEST: ICD-10-CM

## 2021-09-22 DIAGNOSIS — M81.0 AGE-RELATED OSTEOPOROSIS WITHOUT CURRENT PATHOLOGICAL FRACTURE: ICD-10-CM

## 2021-09-22 DIAGNOSIS — R53.82 CHRONIC FATIGUE: ICD-10-CM

## 2021-09-22 DIAGNOSIS — E78.5 HYPERLIPIDEMIA LDL GOAL <70: ICD-10-CM

## 2021-09-22 PROCEDURE — 93000 ELECTROCARDIOGRAM COMPLETE: CPT | Performed by: NURSE PRACTITIONER

## 2021-09-22 PROCEDURE — 99215 OFFICE O/P EST HI 40 MIN: CPT | Performed by: NURSE PRACTITIONER

## 2021-09-22 RX ORDER — ESCITALOPRAM OXALATE 5 MG/1
5 TABLET ORAL DAILY
Qty: 90 TABLET | Refills: 0 | Status: SHIPPED | OUTPATIENT
Start: 2021-09-22 | End: 2021-10-22 | Stop reason: SDUPTHER

## 2021-09-22 RX ORDER — DULOXETIN HYDROCHLORIDE 30 MG/1
30 CAPSULE, DELAYED RELEASE ORAL DAILY
Qty: 7 CAPSULE | Refills: 0 | Status: SHIPPED | OUTPATIENT
Start: 2021-09-22 | End: 2021-10-22

## 2021-09-22 RX ORDER — DULOXETIN HYDROCHLORIDE 60 MG/1
60 CAPSULE, DELAYED RELEASE ORAL DAILY
Qty: 30 CAPSULE | Refills: 0 | Status: SHIPPED | OUTPATIENT
Start: 2021-09-22 | End: 2021-10-22

## 2021-09-22 NOTE — PROGRESS NOTES
"Subjective   Gita Avelar is a 76 y.o. female who presents c/o feeling short of breath. Took milk of magnesia last week for constipation, had a bowel movement and since then has had severe gas pains and shortness of breath. Had moved to [place] but is back in [place] as of last month in [facility].     History of Present Illness   Frail appearing 76 yr old seen last month. We stopped her cymbalta and amlodipine in an attempt to simplify medication regimen. She reports MOM did not help pain. Reports is depressed. Not going to hurt self, but would like to lay down and die.  Still having chest pain. Initially radiated to both jaws. She called the ambulance and they checked her out and told her she was not having a heart attack. She reports a safe relationship with the son here in [place].   Not being tested for COVID at assisted living. No cough or fever. She refuses vaccination    The following portions of the patient's history were reviewed and updated as appropriate: allergies, current medications, past family history, past medical history, past social history, past surgical history and problem list.    Review of Systems   Constitutional: Positive for activity change and fatigue.   HENT: Negative.    Eyes: Negative.    Respiratory: Positive for shortness of breath.    Cardiovascular: Positive for chest pain. Negative for leg swelling.   Gastrointestinal: Positive for constipation.        Gas pain not relieved with MOM    Endocrine: Negative.    Musculoskeletal: Positive for arthralgias and gait problem.   Skin: Positive for color change (sallow).   Allergic/Immunologic: Negative.    Neurological: Positive for weakness.   Hematological: Negative.    Psychiatric/Behavioral: Positive for behavioral problems, dysphoric mood, sleep disturbance and suicidal ideas. Negative for self-injury. The patient is nervous/anxious.      /74   Pulse 70   Temp 96.9 °F (36.1 °C) (Infrared)   Ht 165.1 cm (65\")   Wt 63 kg (139 " lb)   SpO2 99%   BMI 23.13 kg/m²     Objective   Physical Exam  Vitals and nursing note reviewed.   Constitutional:       Appearance: She is ill-appearing. She is not diaphoretic.   HENT:      Head: Normocephalic.   Skin:     General: Skin is warm and dry.      Comments: anastasia   Psychiatric:         Attention and Perception: Attention normal.         Mood and Affect: Mood is anxious and depressed.         Speech: Speech normal.         Behavior: Behavior is agitated. Behavior is cooperative.         Thought Content: Thought content includes suicidal ideation. Thought content does not include suicidal plan.         Cognition and Memory: Cognition normal.         Judgment: Judgment is inappropriate.       Assessment/Plan   Problems Addressed this Visit        Cardiac and Vasculature    Essential hypertension    Relevant Orders    CBC (No Diff) (Completed)    Comprehensive Metabolic Panel (Completed)    Lipid Panel (Completed)       Musculoskeletal and Injuries    OP (osteoporosis)    Relevant Orders    DEXA Bone Density Axial    DEXA Bone Density Axial       Symptoms and Signs    Fatigue    Relevant Orders    TSH (Completed)    Vitamin B12 (Completed)      Other Visit Diagnoses     Precordial pain    -  Primary    Relevant Orders    ECG 12 Lead    Vitamin D deficiency        Relevant Orders    Vitamin D 25 Hydroxy (Completed)    Hyperlipidemia LDL goal <70        Breast cancer screening by mammogram        Relevant Orders    Mammo Screening Bilateral With CAD    Need for hepatitis C screening test        Relevant Orders    Hepatitis C Antibody (Completed)    Severe episode of recurrent major depressive disorder, 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)        Relevant Medications    DULoxetine (CYMBALTA) 30 MG capsule    DULoxetine (CYMBALTA) 60 MG capsule    escitalopram (Lexapro) 5 MG tablet      Diagnoses       Codes Comments    Precordial pain    -  Primary ICD-10-CM: R07.2  ICD-9-CM: 786.51     Vitamin D  deficiency     ICD-10-CM: E55.9  ICD-9-CM: 268.9     Chronic fatigue     ICD-10-CM: R53.82  ICD-9-CM: 780.79     Essential hypertension     ICD-10-CM: I10  ICD-9-CM: 401.9     Age-related osteoporosis with current pathological fracture, sequela     ICD-10-CM: M80.00XS  ICD-9-CM: 905.5, 733.01     Hyperlipidemia LDL goal <70     ICD-10-CM: E78.5  ICD-9-CM: 272.4     Breast cancer screening by mammogram     ICD-10-CM: Z12.31  ICD-9-CM: V76.12     Need for hepatitis C screening test     ICD-10-CM: Z11.59  ICD-9-CM: V73.89     Severe episode of recurrent major depressive disorder, 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 Continue ASA and statin  Vit D deficiency--continue replacement as osteoporosis  HTN--continue BB ok off amlodipine  Osteoporosis--update monitoring and continue prolia  HLD--goal LDL <70 secondary to carotid stenosis  Update breast cancer screening  Screen Hep C--population recommendation  MDD--add back cymbalta and continue low dose SSRI. Consider counseling as severe. Warning signs for immediate evaluation discussed.     FU 1-2 months        ECG 12 Lead    Date/Time: 9/22/2021 1:12 PM  Performed by: Xiomara Batres APRN  Authorized by: Xiomara Batres APRN   Comparison: compared with previous ECG from 11/29/2016  Similar to previous ECG  Rhythm: sinus rhythm  Rate: normal  Conduction: left anterior fascicular block  ST Segments: ST segments normal  T Waves: T waves normal  Other findings: U wave    Clinical impression: abnormal EKG        This document is intended for medical expert use only. Reading of this document by patients and/or patient's family without participating medical staff guidance may result in misinterpretation and unintended morbidity.  Any  interpretation of such data is the responsibility of the patient and/or family member responsible for the patient in concert with their primary or specialist providers, not to be left for sources of online searches such as [employer], [employer] or similar queries. Relying on these approaches to knowledge may result in misinterpretation, misguided goals of care and even death should patients or family members try recommendations outside of the realm of professional medical care in a supervised way.    Please allow 3-5 business days for recommendations based on new results    Go to the ER for any possible lifethreatening symptoms such as chest pain or shortness of air.     I personally spent 62 minutes reviewing the chart before the visit, time with the patient, and time documenting the visit.

## 2021-09-23 LAB
25(OH)D3+25(OH)D2 SERPL-MCNC: 32.6 NG/ML (ref 30–100)
ALBUMIN SERPL-MCNC: 3.6 G/DL (ref 3.7–4.7)
ALBUMIN/GLOB SERPL: 1.2 {RATIO} (ref 1.2–2.2)
ALP SERPL-CCNC: 101 IU/L (ref 44–121)
ALT SERPL-CCNC: 4 IU/L (ref 0–32)
AST SERPL-CCNC: 10 IU/L (ref 0–40)
BILIRUB SERPL-MCNC: 0.5 MG/DL (ref 0–1.2)
BUN SERPL-MCNC: 16 MG/DL (ref 8–27)
BUN/CREAT SERPL: 21 (ref 12–28)
CALCIUM SERPL-MCNC: 9.1 MG/DL (ref 8.7–10.3)
CHLORIDE SERPL-SCNC: 100 MMOL/L (ref 96–106)
CHOLEST SERPL-MCNC: 222 MG/DL (ref 100–199)
CO2 SERPL-SCNC: 24 MMOL/L (ref 20–29)
CREAT SERPL-MCNC: 0.76 MG/DL (ref 0.57–1)
ERYTHROCYTE [DISTWIDTH] IN BLOOD BY AUTOMATED COUNT: 11.9 % (ref 11.7–15.4)
GLOBULIN SER CALC-MCNC: 3.1 G/DL (ref 1.5–4.5)
GLUCOSE SERPL-MCNC: 95 MG/DL (ref 65–99)
HCT VFR BLD AUTO: 37.5 % (ref 34–46.6)
HCV AB S/CO SERPL IA: <0.1 S/CO RATIO (ref 0–0.9)
HDLC SERPL-MCNC: 39 MG/DL
HGB BLD-MCNC: 12.3 G/DL (ref 11.1–15.9)
LDLC SERPL CALC-MCNC: 165 MG/DL (ref 0–99)
MCH RBC QN AUTO: 30.1 PG (ref 26.6–33)
MCHC RBC AUTO-ENTMCNC: 32.8 G/DL (ref 31.5–35.7)
MCV RBC AUTO: 92 FL (ref 79–97)
PLATELET # BLD AUTO: 380 X10E3/UL (ref 150–450)
POTASSIUM SERPL-SCNC: 4 MMOL/L (ref 3.5–5.2)
PROT SERPL-MCNC: 6.7 G/DL (ref 6–8.5)
RBC # BLD AUTO: 4.09 X10E6/UL (ref 3.77–5.28)
SODIUM SERPL-SCNC: 138 MMOL/L (ref 134–144)
TRIGL SERPL-MCNC: 100 MG/DL (ref 0–149)
TSH SERPL DL<=0.005 MIU/L-ACNC: 1.94 UIU/ML (ref 0.45–4.5)
VIT B12 SERPL-MCNC: 727 PG/ML (ref 232–1245)
VLDLC SERPL CALC-MCNC: 18 MG/DL (ref 5–40)
WBC # BLD AUTO: 7.6 X10E3/UL (ref 3.4–10.8)

## 2021-09-23 RX ORDER — ROSUVASTATIN CALCIUM 20 MG/1
20 TABLET, COATED ORAL NIGHTLY
Qty: 90 TABLET | Refills: 1 | Status: SHIPPED | OUTPATIENT
Start: 2021-09-23 | End: 2023-03-01 | Stop reason: SDUPTHER

## 2021-09-23 NOTE — PROGRESS NOTES
Labs ok except cholesterol. Stop pravastatin and start crestor 20 mg nightly #90 1rf FU 1 month regarding depression

## 2021-10-19 ENCOUNTER — OFFICE VISIT (OUTPATIENT)
Dept: FAMILY MEDICINE CLINIC | Facility: CLINIC | Age: 76
End: 2021-10-19

## 2021-10-19 VITALS
BODY MASS INDEX: 22.66 KG/M2 | OXYGEN SATURATION: 97 % | HEART RATE: 71 BPM | SYSTOLIC BLOOD PRESSURE: 158 MMHG | WEIGHT: 136 LBS | HEIGHT: 65 IN | TEMPERATURE: 96.9 F | DIASTOLIC BLOOD PRESSURE: 98 MMHG

## 2021-10-19 DIAGNOSIS — G89.29 CHRONIC BILATERAL LOW BACK PAIN WITHOUT SCIATICA: ICD-10-CM

## 2021-10-19 DIAGNOSIS — R26.81 UNSTABLE GAIT: ICD-10-CM

## 2021-10-19 DIAGNOSIS — H81.11 BENIGN PAROXYSMAL POSITIONAL VERTIGO OF RIGHT EAR: Primary | ICD-10-CM

## 2021-10-19 DIAGNOSIS — M81.0 AGE-RELATED OSTEOPOROSIS WITHOUT CURRENT PATHOLOGICAL FRACTURE: ICD-10-CM

## 2021-10-19 DIAGNOSIS — M54.50 CHRONIC BILATERAL LOW BACK PAIN WITHOUT SCIATICA: ICD-10-CM

## 2021-10-19 PROCEDURE — 99214 OFFICE O/P EST MOD 30 MIN: CPT | Performed by: NURSE PRACTITIONER

## 2021-10-19 NOTE — PROGRESS NOTES
"Subjective   Gita Avelar is a 76 y.o. female who presents c/o dizziness that started 5 days ago. Worse when laying down, feels off balance when sitting up.     History of Present Illness   Dizziness only occurs when turns head to right or rolls on R side. Ear does not hurt. No recent viral illness. Lives in assisted living and son brought her today. Not eating much currently as scared will fall in the dining room. Symptom onset less than 1 week ago. Has chronic severe low back pain and uses walker with wheels. Can stand for only 3 minutes without pain  The following portions of the patient's history were reviewed and updated as appropriate: allergies, current medications, past family history, past medical history, past social history, past surgical history and problem list.    Review of Systems   Constitutional: Positive for appetite change. Negative for activity change and unexpected weight change.   HENT: Negative.    Eyes: Negative.  Negative for visual disturbance.   Respiratory: Negative.    Cardiovascular: Negative.  Negative for chest pain and palpitations.   Gastrointestinal: Negative.  Negative for constipation, diarrhea, nausea and vomiting.   Endocrine: Negative.    Genitourinary: Negative for difficulty urinating and frequency.   Musculoskeletal: Positive for arthralgias and back pain.   Neurological: Positive for dizziness. Negative for seizures, speech difficulty, weakness and headaches.   Hematological: Negative.    Psychiatric/Behavioral: Positive for dysphoric mood. The patient is nervous/anxious.      /98   Pulse 71   Temp 96.9 °F (36.1 °C) (Infrared)   Ht 165.1 cm (65\")   Wt 61.7 kg (136 lb)   SpO2 97%   BMI 22.63 kg/m²     Objective   Physical Exam  Vitals and nursing note reviewed.   Constitutional:       General: She is not in acute distress.     Appearance: She is well-developed. She is ill-appearing. She is not toxic-appearing or diaphoretic.   HENT:      Head: Normocephalic " and atraumatic.      Right Ear: Tympanic membrane normal.      Left Ear: Tympanic membrane normal.   Eyes:      Extraocular Movements:      Right eye: Nystagmus present.      Left eye: Nystagmus present.   Neck:      Thyroid: No thyromegaly.   Cardiovascular:      Rate and Rhythm: Normal rate and regular rhythm.      Pulses:           Carotid pulses are 2+ on the right side and 2+ on the left side.     Heart sounds: Normal heart sounds.   Pulmonary:      Effort: Pulmonary effort is normal.      Breath sounds: Normal breath sounds.   Musculoskeletal:      Cervical back: Normal range of motion and neck supple.   Lymphadenopathy:      Cervical: No cervical adenopathy.   Neurological:      Cranial Nerves: Cranial nerves are intact.      Comments: Symptoms reproduced with lying to sitting with +nystagmus, takes about 2 minutes to settle   Psychiatric:         Mood and Affect: Mood is depressed.         Behavior: Behavior normal.         Thought Content: Thought content normal.         Judgment: Judgment normal.       Assessment/Plan   Problems Addressed this Visit        Musculoskeletal and Injuries    OP (osteoporosis)      Other Visit Diagnoses     Benign paroxysmal positional vertigo of right ear    -  Primary    Chronic bilateral low back pain without sciatica        Unstable gait          Diagnoses       Codes Comments    Benign paroxysmal positional vertigo of right ear    -  Primary ICD-10-CM: H81.11  ICD-9-CM: 386.11     Chronic bilateral low back pain without sciatica     ICD-10-CM: M54.50, G89.29  ICD-9-CM: 724.2, 338.29     Unstable gait     ICD-10-CM: R26.81  ICD-9-CM: 781.2     Age-related osteoporosis without current pathological fracture     ICD-10-CM: M81.0  ICD-9-CM: 733.01         BPPV discussed maneuvers to settle, will add home health PT  Low back pain--work on unstable gait--with home health  Osteoporosis--high falls risk with BPPV--necessary for walker use an PT    This document is intended for  medical expert use only. Reading of this document by patients and/or patient's family without participating medical staff guidance may result in misinterpretation and unintended morbidity.  Any interpretation of such data is the responsibility of the patient and/or family member responsible for the patient in concert with their primary or specialist providers, not to be left for sources of online searches such as [employer], [employer] or similar queries. Relying on these approaches to knowledge may result in misinterpretation, misguided goals of care and even death should patients or family members try recommendations outside of the realm of professional medical care in a supervised way.    Please allow 3-5 business days for recommendations based on new results    Go to the ER for any possible lifethreatening symptoms such as chest pain or shortness of air.      I personally spent 32 minutes reviewing the chart before the visit, time with the patient, and time documenting the visit.           retired and also does maintenance work

## 2021-10-20 DIAGNOSIS — F33.2 SEVERE EPISODE OF RECURRENT MAJOR DEPRESSIVE DISORDER, WITHOUT PSYCHOTIC FEATURES (HCC): ICD-10-CM

## 2021-10-20 DIAGNOSIS — H81.11 BENIGN PAROXYSMAL POSITIONAL VERTIGO OF RIGHT EAR: Primary | ICD-10-CM

## 2021-10-20 RX ORDER — MECLIZINE HYDROCHLORIDE 25 MG/1
25 TABLET ORAL 3 TIMES DAILY PRN
Qty: 21 TABLET | Refills: 0 | Status: SHIPPED | OUTPATIENT
Start: 2021-10-20 | End: 2023-02-09 | Stop reason: HOSPADM

## 2021-10-20 RX ORDER — HYDROXYZINE HYDROCHLORIDE 25 MG/1
TABLET, FILM COATED ORAL
Qty: 90 TABLET | Refills: 0 | OUTPATIENT
Start: 2021-10-20

## 2021-10-22 ENCOUNTER — TELEPHONE (OUTPATIENT)
Dept: FAMILY MEDICINE CLINIC | Facility: CLINIC | Age: 76
End: 2021-10-22

## 2021-10-22 DIAGNOSIS — F33.2 SEVERE EPISODE OF RECURRENT MAJOR DEPRESSIVE DISORDER, WITHOUT PSYCHOTIC FEATURES (HCC): ICD-10-CM

## 2021-10-22 RX ORDER — ESCITALOPRAM OXALATE 5 MG/1
5 TABLET ORAL DAILY
Qty: 90 TABLET | Refills: 0 | Status: SHIPPED | OUTPATIENT
Start: 2021-10-22 | End: 2022-01-17

## 2021-10-22 RX ORDER — CARVEDILOL 6.25 MG/1
6.25 TABLET ORAL 2 TIMES DAILY WITH MEALS
Qty: 180 TABLET | Refills: 0 | Status: SHIPPED | OUTPATIENT
Start: 2021-10-22 | End: 2022-09-09 | Stop reason: SDUPTHER

## 2021-10-22 NOTE — TELEPHONE ENCOUNTER
How much duloxetine--if 30 mg /day, just change to lexapro 5 mg daily, will send with coreg. Take 1/2 dose meclizine if needed

## 2021-10-22 NOTE — TELEPHONE ENCOUNTER
Caller: rodney     Relationship: [facility]  HOME HEALTH     Best call back number: [phone]     What is the best time to reach you:  ANY     Who are you requesting to speak with (clinical staff, provider,  specific staff member): N/A     Do you know the name of the person who called: N/A     What was the call regarding: RECEIVED A REFFERAL FOR HOME HEALTH THE PATIENT IS SUPPOSED TO TAKE LEXAPRO AND carvedilol (COREG) 6.25 MG tablet  AND NOT TAKING THAT AND WAS FLAGGED FOR DEPRESSION DUE TO HER ANSWER ON A QUESTIONNAIRES .     Do you require a callback:  N/A

## 2021-10-22 NOTE — TELEPHONE ENCOUNTER
A nurse says patient handed her a bottle of duloxetine and said that is what she had been taking. She had no coreg or lexapro in sight. Her blood pressure today was 141/90. She mentioned that she took 1 dose of meclizine and it made her very lethargic. Should she continue this?

## 2022-01-17 RX ORDER — DULOXETIN HYDROCHLORIDE 60 MG/1
60 CAPSULE, DELAYED RELEASE ORAL DAILY
Qty: 30 CAPSULE | Refills: 0 | Status: SHIPPED | OUTPATIENT
Start: 2022-01-17 | End: 2022-03-08 | Stop reason: SDUPTHER

## 2022-01-28 ENCOUNTER — TELEPHONE (OUTPATIENT)
Dept: FAMILY MEDICINE CLINIC | Facility: CLINIC | Age: 77
End: 2022-01-28

## 2022-03-08 RX ORDER — DULOXETIN HYDROCHLORIDE 60 MG/1
60 CAPSULE, DELAYED RELEASE ORAL DAILY
Qty: 90 CAPSULE | Refills: 1 | Status: SHIPPED | OUTPATIENT
Start: 2022-03-08 | End: 2022-11-28 | Stop reason: SDUPTHER

## 2022-03-08 NOTE — TELEPHONE ENCOUNTER
Caller: Gita Avelar    Relationship: Self    Best call back number: [phone]    Requested Prescriptions:   Requested Prescriptions     Pending Prescriptions Disp Refills   • DULoxetine (CYMBALTA) 60 MG capsule 30 capsule 0     Sig: Take 1 capsule by mouth Daily. Needs         Pharmacy where request should be sent: [facility] [address] [place] [id number] [facility] - [phone] [facility] [phone] FX     Additional details provided by patient: PATIENT ALSO STATES HAS A UTI AND WOULD LIKE MEDICATION CALLED OUT FOR HER IF POSSIBLE     Does the patient have less than a 3 day supply:  [x] Yes  [] No    Destini Leblanc, [facility] Rep   03/08/22 13:10 EST

## 2022-06-01 DIAGNOSIS — N39.0 URINARY TRACT INFECTION WITHOUT HEMATURIA, SITE UNSPECIFIED: Primary | ICD-10-CM

## 2022-06-03 LAB
APPEARANCE UR: ABNORMAL
BACTERIA #/AREA URNS HPF: ABNORMAL /[HPF]
BACTERIA UR CULT: NORMAL
BACTERIA UR CULT: NORMAL
BILIRUB UR QL STRIP: NEGATIVE
CASTS URNS QL MICRO: ABNORMAL /LPF
COLOR UR: YELLOW
CRYSTALS URNS MICRO: ABNORMAL
EPI CELLS #/AREA URNS HPF: ABNORMAL /HPF (ref 0–10)
GLUCOSE UR QL STRIP: NEGATIVE
HGB UR QL STRIP: NEGATIVE
KETONES UR QL STRIP: NEGATIVE
LEUKOCYTE ESTERASE UR QL STRIP: ABNORMAL
MICRO URNS: ABNORMAL
MUCOUS THREADS URNS QL MICRO: PRESENT
NITRITE UR QL STRIP: NEGATIVE
PH UR STRIP: 6 [PH] (ref 5–7.5)
PROT UR QL STRIP: NEGATIVE
RBC #/AREA URNS HPF: ABNORMAL /HPF (ref 0–2)
SP GR UR STRIP: 1.02 (ref 1–1.03)
UNIDENT CRYS URNS QL MICRO: PRESENT
URINALYSIS REFLEX: ABNORMAL
UROBILINOGEN UR STRIP-MCNC: 0.2 MG/DL (ref 0.2–1)
WBC #/AREA URNS HPF: ABNORMAL /HPF (ref 0–5)

## 2022-09-09 DIAGNOSIS — I10 ESSENTIAL HYPERTENSION: Primary | ICD-10-CM

## 2022-09-09 DIAGNOSIS — I65.23 ATHEROSCLEROSIS OF BOTH CAROTID ARTERIES: ICD-10-CM

## 2022-09-09 DIAGNOSIS — R00.2 AWARENESS OF HEARTBEATS: ICD-10-CM

## 2022-09-09 RX ORDER — CARVEDILOL 6.25 MG/1
6.25 TABLET ORAL 2 TIMES DAILY WITH MEALS
Qty: 60 TABLET | Refills: 0 | Status: SHIPPED | OUTPATIENT
Start: 2022-09-09 | End: 2023-01-19 | Stop reason: SDUPTHER

## 2022-11-28 DIAGNOSIS — F33.1 MODERATE EPISODE OF RECURRENT MAJOR DEPRESSIVE DISORDER: Primary | ICD-10-CM

## 2022-11-28 RX ORDER — DULOXETIN HYDROCHLORIDE 60 MG/1
60 CAPSULE, DELAYED RELEASE ORAL DAILY
Qty: 90 CAPSULE | Refills: 0 | Status: SHIPPED | OUTPATIENT
Start: 2022-11-28 | End: 2023-03-28 | Stop reason: HOSPADM

## 2022-12-02 NOTE — TELEPHONE ENCOUNTER
Not on current med list?   Stop metformin. Complete steroids, antibiotics given by ER and follow up with PCP as needed. pls update allergy list

## 2023-01-19 ENCOUNTER — OFFICE VISIT (OUTPATIENT)
Dept: FAMILY MEDICINE CLINIC | Facility: CLINIC | Age: 78
End: 2023-01-19
Payer: MEDICARE

## 2023-01-19 VITALS
HEART RATE: 79 BPM | SYSTOLIC BLOOD PRESSURE: 152 MMHG | HEIGHT: 65 IN | WEIGHT: 134 LBS | BODY MASS INDEX: 22.33 KG/M2 | DIASTOLIC BLOOD PRESSURE: 100 MMHG | OXYGEN SATURATION: 98 % | RESPIRATION RATE: 18 BRPM | TEMPERATURE: 97.9 F

## 2023-01-19 DIAGNOSIS — I65.23 ATHEROSCLEROSIS OF BOTH CAROTID ARTERIES: ICD-10-CM

## 2023-01-19 DIAGNOSIS — R00.2 AWARENESS OF HEARTBEATS: ICD-10-CM

## 2023-01-19 DIAGNOSIS — F33.1 MODERATE EPISODE OF RECURRENT MAJOR DEPRESSIVE DISORDER: ICD-10-CM

## 2023-01-19 DIAGNOSIS — F41.9 ANXIETY: ICD-10-CM

## 2023-01-19 DIAGNOSIS — Z00.00 MEDICARE ANNUAL WELLNESS VISIT, SUBSEQUENT: Primary | ICD-10-CM

## 2023-01-19 DIAGNOSIS — E55.9 AVITAMINOSIS D: ICD-10-CM

## 2023-01-19 DIAGNOSIS — E53.8 B12 DEFICIENCY: ICD-10-CM

## 2023-01-19 DIAGNOSIS — I10 ESSENTIAL HYPERTENSION: ICD-10-CM

## 2023-01-19 DIAGNOSIS — E78.2 MIXED HYPERLIPIDEMIA: ICD-10-CM

## 2023-01-19 PROCEDURE — 1170F FXNL STATUS ASSESSED: CPT

## 2023-01-19 PROCEDURE — 1126F AMNT PAIN NOTED NONE PRSNT: CPT

## 2023-01-19 PROCEDURE — 1159F MED LIST DOCD IN RCRD: CPT

## 2023-01-19 PROCEDURE — G0439 PPPS, SUBSEQ VISIT: HCPCS

## 2023-01-19 PROCEDURE — 99214 OFFICE O/P EST MOD 30 MIN: CPT

## 2023-01-19 RX ORDER — BUSPIRONE HYDROCHLORIDE 5 MG/1
5 TABLET ORAL 3 TIMES DAILY
Qty: 90 TABLET | Refills: 0 | Status: SHIPPED | OUTPATIENT
Start: 2023-01-19 | End: 2023-03-28 | Stop reason: SINTOL

## 2023-01-19 RX ORDER — CARVEDILOL 6.25 MG/1
6.25 TABLET ORAL 2 TIMES DAILY WITH MEALS
Qty: 90 TABLET | Refills: 1 | Status: SHIPPED | OUTPATIENT
Start: 2023-01-19 | End: 2023-02-02 | Stop reason: HOSPADM

## 2023-01-19 RX ORDER — BISACODYL 5 MG/1
5 TABLET, DELAYED RELEASE ORAL DAILY PRN
COMMUNITY
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
## 2023-01-19 NOTE — PROGRESS NOTES
The ABCs of the Annual Wellness Visit  Subsequent Medicare Wellness Visit    Subjective      Gita Avelar is a 78 y.o. female who presents for a Subsequent Medicare Wellness Visit.    The following portions of the patient's history were reviewed and   updated as appropriate: allergies, current medications, past family history, past medical history, past social history and past surgical history.    Compared to one year ago, the patient feels her physical   health is worse.    Compared to one year ago, the patient feels her mental   health is worse.    Recent Hospitalizations:  She was not admitted to the hospital during the last year.       Current Medical Providers:  Patient Care Team:  Ernie Wright APRN as PCP - General (Family Medicine)    Outpatient Medications Prior to Visit   Medication Sig Dispense Refill   • aspirin 81 MG tablet Take 81 mg by mouth Daily.     • bisacodyl (DULCOLAX) 5 MG EC tablet Take 5 mg by mouth Daily As Needed for Constipation.     • cholecalciferol (VITAMIN D3) 1000 UNITS tablet Take 1,000 Units by mouth Daily.     • DULoxetine (CYMBALTA) 60 MG capsule Take 1 capsule by mouth Daily. Needs FU 90 capsule 0   • lisinopril (PRINIVIL,ZESTRIL) 40 MG tablet Take 40 mg by mouth Daily.     • meclizine (ANTIVERT) 25 MG tablet Take 1 tablet by mouth 3 (Three) Times a Day As Needed for Dizziness. 21 tablet 0   • Multiple Vitamins-Minerals (PRESERVISION AREDS PO) Take  by mouth.     • rosuvastatin (Crestor) 20 MG tablet Take 1 tablet by mouth Every Night. 90 tablet 1   • carvedilol (COREG) 6.25 MG tablet Take 1 tablet by mouth 2 (Two) Times a Day With Meals. 60 tablet 0     No facility-administered medications prior to visit.       No opioid medication identified on active medication list. I have reviewed chart for other potential  high risk medication/s and harmful drug interactions in the elderly.          Aspirin is on active medication list. Aspirin use is indicated based on review of  "current medical condition/s. Pros and cons of this therapy have been discussed today. Benefits of this medication outweigh potential harm.  Patient has been encouraged to continue taking this medication.  .      Patient Active Problem List   Diagnosis   • Avitaminosis D   • B12 deficiency   • Near syncope   • Atrophic vaginitis   • Awareness of heartbeats   • OP (osteoporosis)   • HLD (hyperlipidemia)   • Fatigue   • Essential hypertension   • Moderate episode of recurrent major depressive disorder (HCC)   • Nipple discharge   • Abnormal ultrasound of breast   • Abnormal mammogram   • Lump or mass in breast   • Family history of malignant neoplasm of pancreas   • Atherosclerosis of both carotid arteries     Advance Care Planning  Advance Directive is on file.  ACP discussion was held with the patient during this visit. Patient has an advance directive in EMR which is still valid.      Objective    Vitals:    23 1508   BP: 152/100   Pulse: 79   Resp: 18   Temp: 97.9 °F (36.6 °C)   TempSrc: Temporal   SpO2: 98%   Weight: 60.8 kg (134 lb)   Height: 165.1 cm (65\")   PainSc: 0-No pain     Estimated body mass index is 22.3 kg/m² as calculated from the following:    Height as of this encounter: 165.1 cm (65\").    Weight as of this encounter: 60.8 kg (134 lb).    BMI is within normal parameters. No other follow-up for BMI required.      Does the patient have evidence of cognitive impairment?   No            HEALTH RISK ASSESSMENT    Smoking Status:  Social History     Tobacco Use   Smoking Status Former   • Packs/day: 0.50   • Years: 25.00   • Pack years: 12.50   • Types: Cigarettes   • Start date:    • Quit date: 2021   • Years since quittin.5   Smokeless Tobacco Never     Alcohol Consumption:  Social History     Substance and Sexual Activity   Alcohol Use Yes    Comment: occasionally      Fall Risk Screen:    STEADI Fall Risk Assessment was completed, and patient is at HIGH risk for falls. Assessment " completed on:1/19/2023    Depression Screening:  PHQ-2/PHQ-9 Depression Screening 1/19/2023   Little Interest or Pleasure in Doing Things 2-->more than half the days   Feeling Down, Depressed or Hopeless 2-->more than half the days   Trouble Falling or Staying Asleep, or Sleeping Too Much 2-->more than half the days   Feeling Tired or Having Little Energy 2-->more than half the days   Poor Appetite or Overeating 0-->not at all   Feeling Bad about Yourself - or that You are a Failure or Have Let Yourself or Your Family Down 1-->several days   Trouble Concentrating on Things, Such as Reading the Newspaper or Watching Television 1-->several days   Moving or Speaking So Slowly that Other People Could Have Noticed? Or the Opposite - Being So Fidgety 0-->not at all   Thoughts that You Would be Better Off Dead or of Hurting Yourself in Some Way 0-->not at all   PHQ-9: Brief Depression Severity Measure Score 10   If You Checked Off Any Problems, How Difficult Have These Problems Made It For You to Do Your Work, Take Care of Things at Home, or Get Along with Other People? somewhat difficult       Health Habits and Functional and Cognitive Screening:  Functional & Cognitive Status 1/19/2023   Do you have difficulty preparing food and eating? No   Do you have difficulty bathing yourself, getting dressed or grooming yourself? No   Do you have difficulty using the toilet? No   Do you have difficulty moving around from place to place? No   Do you have trouble with steps or getting out of a bed or a chair? No   Current Diet Well Balanced Diet   Dental Exam Not up to date   Eye Exam Not up to date   Exercise (times per week) 0 times per week   Current Exercises Include No Regular Exercise   Current Exercise Activities Include -   Do you need help using the phone?  No   Are you deaf or do you have serious difficulty hearing?  No   Do you need help with transportation? No   Do you need help shopping? No   Do you need help preparing  meals?  No   Do you need help with housework?  No   Do you need help with laundry? No   Do you need help taking your medications? No   Do you need help managing money? No   Do you ever drive or ride in a car without wearing a seat belt? No   Have you felt unusual stress, anger or loneliness in the last month? No   Who do you live with? Alone   If you need help, do you have trouble finding someone available to you? No   Have you been bothered in the last four weeks by sexual problems? No   Do you have difficulty concentrating, remembering or making decisions? No       Age-appropriate Screening Schedule:  Refer to the list below for future screening recommendations based on patient's age, sex and/or medical conditions. Orders for these recommended tests are listed in the plan section. The patient has been provided with a written plan.    Health Maintenance   Topic Date Due   • LIPID PANEL  09/22/2022   • DXA SCAN  01/19/2023 (Originally 7/7/2017)   • INFLUENZA VACCINE  03/31/2023 (Originally 8/1/2022)   • MAMMOGRAM  01/19/2024 (Originally 1/15/2020)   • TDAP/TD VACCINES (1 - Tdap) 01/19/2024 (Originally 1/13/1964)                CMS Preventative Services Quick Reference  Risk Factors Identified During Encounter:    Depression/Dysphoria: Current medication adjusted.  Follow up visit planned.  Fall Risk-High or Moderate: Discussed Fall Prevention in the home  Polypharmacy: Medication List reviewed  Vision Screening Recommended    The above risks/problems have been discussed with the patient.  Pertinent information has been shared with the patient in the After Visit Summary.    Diagnoses and all orders for this visit:    1. Medicare annual wellness visit, subsequent (Primary)    2. Anxiety  -     busPIRone (BUSPAR) 5 MG tablet; Take 1 tablet by mouth 3 (Three) Times a Day.  Dispense: 90 tablet; Refill: 0    3. Moderate episode of recurrent major depressive disorder (HCC)  -     TSH    4. Essential hypertension  -     CBC  & Differential  -     Comprehensive Metabolic Panel  -     TSH  -     carvedilol (COREG) 6.25 MG tablet; Take 1 tablet by mouth 2 (Two) Times a Day With Meals.  Dispense: 90 tablet; Refill: 1    5. Mixed hyperlipidemia  -     Lipid Panel    6. Avitaminosis D  -     Vitamin D 25 hydroxy    7. B12 deficiency  -     Vitamin B12    8. Awareness of heartbeats  -     carvedilol (COREG) 6.25 MG tablet; Take 1 tablet by mouth 2 (Two) Times a Day With Meals.  Dispense: 90 tablet; Refill: 1    9. Atherosclerosis of both carotid arteries  -     carvedilol (COREG) 6.25 MG tablet; Take 1 tablet by mouth 2 (Two) Times a Day With Meals.  Dispense: 90 tablet; Refill: 1        Follow Up: 2 weeks for BP and anxiety f/u, 1 yr for MWV  Next Medicare Wellness visit to be scheduled in 1 year.      An After Visit Summary and PPPS were made available to the patient.

## 2023-01-20 LAB
25(OH)D3+25(OH)D2 SERPL-MCNC: 30.3 NG/ML (ref 30–100)
ALBUMIN SERPL-MCNC: 4.1 G/DL (ref 3.7–4.7)
ALBUMIN/GLOB SERPL: 1.6 {RATIO} (ref 1.2–2.2)
ALP SERPL-CCNC: 103 IU/L (ref 44–121)
ALT SERPL-CCNC: 6 IU/L (ref 0–32)
AST SERPL-CCNC: 16 IU/L (ref 0–40)
BASOPHILS # BLD AUTO: 0.1 X10E3/UL (ref 0–0.2)
BASOPHILS NFR BLD AUTO: 1 %
BILIRUB SERPL-MCNC: 0.3 MG/DL (ref 0–1.2)
BUN SERPL-MCNC: 19 MG/DL (ref 8–27)
BUN/CREAT SERPL: 22 (ref 12–28)
CALCIUM SERPL-MCNC: 10.2 MG/DL (ref 8.7–10.3)
CHLORIDE SERPL-SCNC: 103 MMOL/L (ref 96–106)
CHOLEST SERPL-MCNC: 290 MG/DL (ref 100–199)
CO2 SERPL-SCNC: 26 MMOL/L (ref 20–29)
CREAT SERPL-MCNC: 0.88 MG/DL (ref 0.57–1)
EGFRCR SERPLBLD CKD-EPI 2021: 67 ML/MIN/1.73
EOSINOPHIL # BLD AUTO: 0.1 X10E3/UL (ref 0–0.4)
EOSINOPHIL NFR BLD AUTO: 1 %
ERYTHROCYTE [DISTWIDTH] IN BLOOD BY AUTOMATED COUNT: 12.6 % (ref 11.7–15.4)
GLOBULIN SER CALC-MCNC: 2.6 G/DL (ref 1.5–4.5)
GLUCOSE SERPL-MCNC: 82 MG/DL (ref 70–99)
HCT VFR BLD AUTO: 43.6 % (ref 34–46.6)
HDLC SERPL-MCNC: 49 MG/DL
HGB BLD-MCNC: 14.6 G/DL (ref 11.1–15.9)
IMM GRANULOCYTES # BLD AUTO: 0 X10E3/UL (ref 0–0.1)
IMM GRANULOCYTES NFR BLD AUTO: 0 %
LABORATORY COMMENT REPORT: ABNORMAL
LDLC SERPL CALC-MCNC: 216 MG/DL (ref 0–99)
LYMPHOCYTES # BLD AUTO: 2.8 X10E3/UL (ref 0.7–3.1)
LYMPHOCYTES NFR BLD AUTO: 43 %
MCH RBC QN AUTO: 30.2 PG (ref 26.6–33)
MCHC RBC AUTO-ENTMCNC: 33.5 G/DL (ref 31.5–35.7)
MCV RBC AUTO: 90 FL (ref 79–97)
MONOCYTES # BLD AUTO: 0.7 X10E3/UL (ref 0.1–0.9)
MONOCYTES NFR BLD AUTO: 10 %
NEUTROPHILS # BLD AUTO: 2.9 X10E3/UL (ref 1.4–7)
NEUTROPHILS NFR BLD AUTO: 45 %
PLATELET # BLD AUTO: 242 X10E3/UL (ref 150–450)
POTASSIUM SERPL-SCNC: 3.5 MMOL/L (ref 3.5–5.2)
PROT SERPL-MCNC: 6.7 G/DL (ref 6–8.5)
RBC # BLD AUTO: 4.83 X10E6/UL (ref 3.77–5.28)
SODIUM SERPL-SCNC: 142 MMOL/L (ref 134–144)
TRIGL SERPL-MCNC: 134 MG/DL (ref 0–149)
TSH SERPL DL<=0.005 MIU/L-ACNC: 2.18 UIU/ML (ref 0.45–4.5)
VIT B12 SERPL-MCNC: 488 PG/ML (ref 232–1245)
VLDLC SERPL CALC-MCNC: 25 MG/DL (ref 5–40)
WBC # BLD AUTO: 6.6 X10E3/UL (ref 3.4–10.8)

## 2023-01-20 NOTE — PROGRESS NOTES
Please call pt with abnormal results    Her cholesterol is very high. Make sure is taking her statin. Work on healthy diet.     Blood levels within normal limits  Kidney, liver and thyroid function stable  Vitamin D and B12 stable.     Repeat labs in 6 months. Call if questions.

## 2023-01-29 ENCOUNTER — APPOINTMENT (OUTPATIENT)
Dept: GENERAL RADIOLOGY | Facility: HOSPITAL | Age: 78
DRG: 871 | End: 2023-01-29
Payer: MEDICARE

## 2023-01-29 ENCOUNTER — HOSPITAL ENCOUNTER (INPATIENT)
Facility: HOSPITAL | Age: 78
LOS: 3 days | Discharge: HOME HEALTH | DRG: 871 | End: 2023-02-02
Attending: EMERGENCY MEDICINE | Admitting: INTERNAL MEDICINE
Payer: MEDICARE

## 2023-01-29 DIAGNOSIS — N39.0 ACUTE UTI: Primary | ICD-10-CM

## 2023-01-29 DIAGNOSIS — R53.1 GENERALIZED WEAKNESS: ICD-10-CM

## 2023-01-29 DIAGNOSIS — U07.1 COVID-19: ICD-10-CM

## 2023-01-29 DIAGNOSIS — E87.6 HYPOKALEMIA: ICD-10-CM

## 2023-01-29 PROBLEM — R09.02 HYPOXIA: Status: ACTIVE | Noted: 2023-01-29

## 2023-01-29 LAB
ALBUMIN SERPL-MCNC: 4 G/DL (ref 3.5–5.2)
ALBUMIN/GLOB SERPL: 1.3 G/DL
ALP SERPL-CCNC: 83 U/L (ref 39–117)
ALT SERPL W P-5'-P-CCNC: 12 U/L (ref 1–33)
ANION GAP SERPL CALCULATED.3IONS-SCNC: 10.7 MMOL/L (ref 5–15)
AST SERPL-CCNC: 20 U/L (ref 1–32)
B PARAPERT DNA SPEC QL NAA+PROBE: NOT DETECTED
B PERT DNA SPEC QL NAA+PROBE: NOT DETECTED
BACTERIA UR QL AUTO: ABNORMAL /HPF
BASOPHILS # BLD AUTO: 0.01 10*3/MM3 (ref 0–0.2)
BASOPHILS NFR BLD AUTO: 0.2 % (ref 0–1.5)
BILIRUB SERPL-MCNC: 0.7 MG/DL (ref 0–1.2)
BILIRUB UR QL STRIP: NEGATIVE
BUN SERPL-MCNC: 16 MG/DL (ref 8–23)
BUN/CREAT SERPL: 18.4 (ref 7–25)
C PNEUM DNA NPH QL NAA+NON-PROBE: NOT DETECTED
CALCIUM SPEC-SCNC: 9.2 MG/DL (ref 8.6–10.5)
CHLORIDE SERPL-SCNC: 100 MMOL/L (ref 98–107)
CLARITY UR: CLEAR
CO2 SERPL-SCNC: 29.3 MMOL/L (ref 22–29)
COLOR UR: YELLOW
CREAT SERPL-MCNC: 0.87 MG/DL (ref 0.57–1)
DEPRECATED RDW RBC AUTO: 42.7 FL (ref 37–54)
EGFRCR SERPLBLD CKD-EPI 2021: 68.3 ML/MIN/1.73
EOSINOPHIL # BLD AUTO: 0 10*3/MM3 (ref 0–0.4)
EOSINOPHIL NFR BLD AUTO: 0 % (ref 0.3–6.2)
ERYTHROCYTE [DISTWIDTH] IN BLOOD BY AUTOMATED COUNT: 12.8 % (ref 12.3–15.4)
FLUAV SUBTYP SPEC NAA+PROBE: NOT DETECTED
FLUBV RNA ISLT QL NAA+PROBE: NOT DETECTED
GLOBULIN UR ELPH-MCNC: 3.2 GM/DL
GLUCOSE SERPL-MCNC: 93 MG/DL (ref 65–99)
GLUCOSE UR STRIP-MCNC: NEGATIVE MG/DL
HADV DNA SPEC NAA+PROBE: NOT DETECTED
HCOV 229E RNA SPEC QL NAA+PROBE: NOT DETECTED
HCOV HKU1 RNA SPEC QL NAA+PROBE: NOT DETECTED
HCOV NL63 RNA SPEC QL NAA+PROBE: NOT DETECTED
HCOV OC43 RNA SPEC QL NAA+PROBE: NOT DETECTED
HCT VFR BLD AUTO: 45.8 % (ref 34–46.6)
HGB BLD-MCNC: 15 G/DL (ref 12–15.9)
HGB UR QL STRIP.AUTO: NEGATIVE
HMPV RNA NPH QL NAA+NON-PROBE: NOT DETECTED
HOLD SPECIMEN: NORMAL
HOLD SPECIMEN: NORMAL
HPIV1 RNA ISLT QL NAA+PROBE: NOT DETECTED
HPIV2 RNA SPEC QL NAA+PROBE: NOT DETECTED
HPIV3 RNA NPH QL NAA+PROBE: NOT DETECTED
HPIV4 P GENE NPH QL NAA+PROBE: NOT DETECTED
HYALINE CASTS UR QL AUTO: ABNORMAL /LPF
IMM GRANULOCYTES # BLD AUTO: 0.01 10*3/MM3 (ref 0–0.05)
IMM GRANULOCYTES NFR BLD AUTO: 0.2 % (ref 0–0.5)
INR PPP: 0.96 (ref 0.9–1.1)
KETONES UR QL STRIP: ABNORMAL
LEUKOCYTE ESTERASE UR QL STRIP.AUTO: ABNORMAL
LYMPHOCYTES # BLD AUTO: 1.58 10*3/MM3 (ref 0.7–3.1)
LYMPHOCYTES NFR BLD AUTO: 32.6 % (ref 19.6–45.3)
M PNEUMO IGG SER IA-ACNC: NOT DETECTED
MAGNESIUM SERPL-MCNC: 2 MG/DL (ref 1.6–2.4)
MCH RBC QN AUTO: 29.9 PG (ref 26.6–33)
MCHC RBC AUTO-ENTMCNC: 32.8 G/DL (ref 31.5–35.7)
MCV RBC AUTO: 91.2 FL (ref 79–97)
MONOCYTES # BLD AUTO: 0.51 10*3/MM3 (ref 0.1–0.9)
MONOCYTES NFR BLD AUTO: 10.5 % (ref 5–12)
NEUTROPHILS NFR BLD AUTO: 2.73 10*3/MM3 (ref 1.7–7)
NEUTROPHILS NFR BLD AUTO: 56.5 % (ref 42.7–76)
NITRITE UR QL STRIP: POSITIVE
NRBC BLD AUTO-RTO: 0 /100 WBC (ref 0–0.2)
NT-PROBNP SERPL-MCNC: 311 PG/ML (ref 0–1800)
PH UR STRIP.AUTO: 6.5 [PH] (ref 5–8)
PLATELET # BLD AUTO: 178 10*3/MM3 (ref 140–450)
PMV BLD AUTO: 9.5 FL (ref 6–12)
POTASSIUM SERPL-SCNC: 3.4 MMOL/L (ref 3.5–5.2)
PROT SERPL-MCNC: 7.2 G/DL (ref 6–8.5)
PROT UR QL STRIP: ABNORMAL
PROTHROMBIN TIME: 12.9 SECONDS (ref 11.7–14.2)
QT INTERVAL: 429 MS
RBC # BLD AUTO: 5.02 10*6/MM3 (ref 3.77–5.28)
RBC # UR STRIP: ABNORMAL /HPF
REF LAB TEST METHOD: ABNORMAL
RHINOVIRUS RNA SPEC NAA+PROBE: NOT DETECTED
RSV RNA NPH QL NAA+NON-PROBE: NOT DETECTED
SARS-COV-2 RNA NPH QL NAA+NON-PROBE: DETECTED
SODIUM SERPL-SCNC: 140 MMOL/L (ref 136–145)
SP GR UR STRIP: 1.01 (ref 1–1.03)
SQUAMOUS #/AREA URNS HPF: ABNORMAL /HPF
TROPONIN T SERPL-MCNC: <0.01 NG/ML (ref 0–0.03)
UROBILINOGEN UR QL STRIP: ABNORMAL
WBC # UR STRIP: ABNORMAL /HPF
WBC NRBC COR # BLD: 4.84 10*3/MM3 (ref 3.4–10.8)
WHOLE BLOOD HOLD COAG: NORMAL
WHOLE BLOOD HOLD SPECIMEN: NORMAL

## 2023-01-29 PROCEDURE — 85610 PROTHROMBIN TIME: CPT | Performed by: EMERGENCY MEDICINE

## 2023-01-29 PROCEDURE — G0378 HOSPITAL OBSERVATION PER HR: HCPCS

## 2023-01-29 PROCEDURE — 36415 COLL VENOUS BLD VENIPUNCTURE: CPT

## 2023-01-29 PROCEDURE — 87040 BLOOD CULTURE FOR BACTERIA: CPT | Performed by: PHYSICIAN ASSISTANT

## 2023-01-29 PROCEDURE — 87150 DNA/RNA AMPLIFIED PROBE: CPT | Performed by: PHYSICIAN ASSISTANT

## 2023-01-29 PROCEDURE — 87186 SC STD MICRODIL/AGAR DIL: CPT | Performed by: PHYSICIAN ASSISTANT

## 2023-01-29 PROCEDURE — 87077 CULTURE AEROBIC IDENTIFY: CPT | Performed by: PHYSICIAN ASSISTANT

## 2023-01-29 PROCEDURE — 99285 EMERGENCY DEPT VISIT HI MDM: CPT

## 2023-01-29 PROCEDURE — 71045 X-RAY EXAM CHEST 1 VIEW: CPT

## 2023-01-29 PROCEDURE — 83880 ASSAY OF NATRIURETIC PEPTIDE: CPT | Performed by: EMERGENCY MEDICINE

## 2023-01-29 PROCEDURE — 84484 ASSAY OF TROPONIN QUANT: CPT | Performed by: EMERGENCY MEDICINE

## 2023-01-29 PROCEDURE — 93005 ELECTROCARDIOGRAM TRACING: CPT | Performed by: EMERGENCY MEDICINE

## 2023-01-29 PROCEDURE — 0202U NFCT DS 22 TRGT SARS-COV-2: CPT | Performed by: PHYSICIAN ASSISTANT

## 2023-01-29 PROCEDURE — 81001 URINALYSIS AUTO W/SCOPE: CPT | Performed by: PHYSICIAN ASSISTANT

## 2023-01-29 PROCEDURE — 87086 URINE CULTURE/COLONY COUNT: CPT | Performed by: PHYSICIAN ASSISTANT

## 2023-01-29 PROCEDURE — 85025 COMPLETE CBC W/AUTO DIFF WBC: CPT | Performed by: EMERGENCY MEDICINE

## 2023-01-29 PROCEDURE — 25010000002 CEFTRIAXONE PER 250 MG: Performed by: PHYSICIAN ASSISTANT

## 2023-01-29 PROCEDURE — XW033E5 INTRODUCTION OF REMDESIVIR ANTI-INFECTIVE INTO PERIPHERAL VEIN, PERCUTANEOUS APPROACH, NEW TECHNOLOGY GROUP 5: ICD-10-PCS | Performed by: INTERNAL MEDICINE

## 2023-01-29 PROCEDURE — 83735 ASSAY OF MAGNESIUM: CPT | Performed by: EMERGENCY MEDICINE

## 2023-01-29 PROCEDURE — 25010000002 ONDANSETRON PER 1 MG: Performed by: PHYSICIAN ASSISTANT

## 2023-01-29 PROCEDURE — 87181 SC STD AGAR DILUTION PER AGT: CPT | Performed by: PHYSICIAN ASSISTANT

## 2023-01-29 PROCEDURE — 93010 ELECTROCARDIOGRAM REPORT: CPT | Performed by: INTERNAL MEDICINE

## 2023-01-29 PROCEDURE — 80053 COMPREHEN METABOLIC PANEL: CPT | Performed by: EMERGENCY MEDICINE

## 2023-01-29 RX ORDER — ASPIRIN 81 MG/1
81 TABLET ORAL DAILY
Status: DISCONTINUED | OUTPATIENT
Start: 2023-01-30 | End: 2023-02-02 | Stop reason: HOSPADM

## 2023-01-29 RX ORDER — ACETAMINOPHEN 650 MG/1
650 SUPPOSITORY RECTAL EVERY 4 HOURS PRN
Status: DISCONTINUED | OUTPATIENT
Start: 2023-01-29 | End: 2023-02-02 | Stop reason: HOSPADM

## 2023-01-29 RX ORDER — MULTIPLE VITAMINS W/ MINERALS TAB 9MG-400MCG
1 TAB ORAL DAILY
Status: DISCONTINUED | OUTPATIENT
Start: 2023-01-30 | End: 2023-02-02 | Stop reason: HOSPADM

## 2023-01-29 RX ORDER — SODIUM CHLORIDE 0.9 % (FLUSH) 0.9 %
10 SYRINGE (ML) INJECTION EVERY 12 HOURS SCHEDULED
Status: DISCONTINUED | OUTPATIENT
Start: 2023-01-29 | End: 2023-02-02 | Stop reason: HOSPADM

## 2023-01-29 RX ORDER — LISINOPRIL 40 MG/1
40 TABLET ORAL DAILY
Status: DISCONTINUED | OUTPATIENT
Start: 2023-01-30 | End: 2023-02-02 | Stop reason: HOSPADM

## 2023-01-29 RX ORDER — BUSPIRONE HYDROCHLORIDE 5 MG/1
5 TABLET ORAL 3 TIMES DAILY
Status: DISCONTINUED | OUTPATIENT
Start: 2023-01-29 | End: 2023-02-02 | Stop reason: HOSPADM

## 2023-01-29 RX ORDER — SODIUM CHLORIDE AND POTASSIUM CHLORIDE 150; 900 MG/100ML; MG/100ML
75 INJECTION, SOLUTION INTRAVENOUS CONTINUOUS
Status: DISCONTINUED | OUTPATIENT
Start: 2023-01-29 | End: 2023-01-31

## 2023-01-29 RX ORDER — SODIUM CHLORIDE 0.9 % (FLUSH) 0.9 %
10 SYRINGE (ML) INJECTION AS NEEDED
Status: DISCONTINUED | OUTPATIENT
Start: 2023-01-29 | End: 2023-02-02 | Stop reason: HOSPADM

## 2023-01-29 RX ORDER — ROSUVASTATIN CALCIUM 20 MG/1
20 TABLET, COATED ORAL NIGHTLY
Status: DISCONTINUED | OUTPATIENT
Start: 2023-01-29 | End: 2023-02-02 | Stop reason: HOSPADM

## 2023-01-29 RX ORDER — ONDANSETRON 2 MG/ML
4 INJECTION INTRAMUSCULAR; INTRAVENOUS ONCE
Status: COMPLETED | OUTPATIENT
Start: 2023-01-29 | End: 2023-01-29

## 2023-01-29 RX ORDER — NITROGLYCERIN 0.4 MG/1
0.4 TABLET SUBLINGUAL
Status: DISCONTINUED | OUTPATIENT
Start: 2023-01-29 | End: 2023-02-02 | Stop reason: HOSPADM

## 2023-01-29 RX ORDER — ACETAMINOPHEN 325 MG/1
650 TABLET ORAL EVERY 4 HOURS PRN
Status: DISCONTINUED | OUTPATIENT
Start: 2023-01-29 | End: 2023-02-02 | Stop reason: HOSPADM

## 2023-01-29 RX ORDER — DEXAMETHASONE SODIUM PHOSPHATE 10 MG/ML
6 INJECTION INTRAMUSCULAR; INTRAVENOUS DAILY
Status: DISCONTINUED | OUTPATIENT
Start: 2023-01-30 | End: 2023-02-01

## 2023-01-29 RX ORDER — ENOXAPARIN SODIUM 100 MG/ML
40 INJECTION SUBCUTANEOUS DAILY
Status: DISCONTINUED | OUTPATIENT
Start: 2023-01-29 | End: 2023-02-02 | Stop reason: HOSPADM

## 2023-01-29 RX ORDER — DULOXETIN HYDROCHLORIDE 60 MG/1
60 CAPSULE, DELAYED RELEASE ORAL DAILY
Status: DISCONTINUED | OUTPATIENT
Start: 2023-01-30 | End: 2023-02-02 | Stop reason: HOSPADM

## 2023-01-29 RX ORDER — MECLIZINE HYDROCHLORIDE 25 MG/1
25 TABLET ORAL 3 TIMES DAILY PRN
Status: DISCONTINUED | OUTPATIENT
Start: 2023-01-29 | End: 2023-02-02 | Stop reason: HOSPADM

## 2023-01-29 RX ORDER — BISACODYL 5 MG/1
5 TABLET, DELAYED RELEASE ORAL DAILY PRN
Status: DISCONTINUED | OUTPATIENT
Start: 2023-01-29 | End: 2023-02-02 | Stop reason: HOSPADM

## 2023-01-29 RX ORDER — ONDANSETRON 2 MG/ML
4 INJECTION INTRAMUSCULAR; INTRAVENOUS EVERY 6 HOURS PRN
Status: DISCONTINUED | OUTPATIENT
Start: 2023-01-29 | End: 2023-02-02 | Stop reason: HOSPADM

## 2023-01-29 RX ORDER — SODIUM CHLORIDE 9 MG/ML
40 INJECTION, SOLUTION INTRAVENOUS AS NEEDED
Status: DISCONTINUED | OUTPATIENT
Start: 2023-01-29 | End: 2023-02-02 | Stop reason: HOSPADM

## 2023-01-29 RX ORDER — ACETAMINOPHEN 160 MG/5ML
650 SOLUTION ORAL EVERY 4 HOURS PRN
Status: DISCONTINUED | OUTPATIENT
Start: 2023-01-29 | End: 2023-02-02 | Stop reason: HOSPADM

## 2023-01-29 RX ORDER — CARVEDILOL 6.25 MG/1
6.25 TABLET ORAL 2 TIMES DAILY WITH MEALS
Status: DISCONTINUED | OUTPATIENT
Start: 2023-01-29 | End: 2023-01-31

## 2023-01-29 RX ORDER — MELATONIN
1000 DAILY
Status: DISCONTINUED | OUTPATIENT
Start: 2023-01-30 | End: 2023-02-02 | Stop reason: HOSPADM

## 2023-01-29 RX ADMIN — ONDANSETRON 4 MG: 2 INJECTION INTRAMUSCULAR; INTRAVENOUS at 16:13

## 2023-01-29 RX ADMIN — SODIUM CHLORIDE 2 G: 9 INJECTION, SOLUTION INTRAVENOUS at 17:28

## 2023-01-30 LAB
ALBUMIN SERPL-MCNC: 3.2 G/DL (ref 3.5–5.2)
ALBUMIN/GLOB SERPL: 1.2 G/DL
ALP SERPL-CCNC: 66 U/L (ref 39–117)
ALT SERPL W P-5'-P-CCNC: 11 U/L (ref 1–33)
ANION GAP SERPL CALCULATED.3IONS-SCNC: 10.3 MMOL/L (ref 5–15)
AST SERPL-CCNC: 18 U/L (ref 1–32)
BACTERIA BLD CULT: ABNORMAL
BACTERIA SPEC AEROBE CULT: NO GROWTH
BASOPHILS # BLD AUTO: 0.01 10*3/MM3 (ref 0–0.2)
BASOPHILS NFR BLD AUTO: 0.2 % (ref 0–1.5)
BILIRUB CONJ SERPL-MCNC: <0.2 MG/DL (ref 0–0.3)
BILIRUB SERPL-MCNC: 0.3 MG/DL (ref 0–1.2)
BOTTLE TYPE: ABNORMAL
BUN SERPL-MCNC: 15 MG/DL (ref 8–23)
BUN/CREAT SERPL: 19.7 (ref 7–25)
CALCIUM SPEC-SCNC: 8.2 MG/DL (ref 8.6–10.5)
CHLORIDE SERPL-SCNC: 103 MMOL/L (ref 98–107)
CO2 SERPL-SCNC: 25.7 MMOL/L (ref 22–29)
CREAT SERPL-MCNC: 0.76 MG/DL (ref 0.57–1)
D-LACTATE SERPL-SCNC: 0.8 MMOL/L (ref 0.5–2)
DEPRECATED RDW RBC AUTO: 41.1 FL (ref 37–54)
EGFRCR SERPLBLD CKD-EPI 2021: 80.3 ML/MIN/1.73
EOSINOPHIL # BLD AUTO: 0.01 10*3/MM3 (ref 0–0.4)
EOSINOPHIL NFR BLD AUTO: 0.2 % (ref 0.3–6.2)
ERYTHROCYTE [DISTWIDTH] IN BLOOD BY AUTOMATED COUNT: 12.4 % (ref 12.3–15.4)
GLOBULIN UR ELPH-MCNC: 2.6 GM/DL
GLUCOSE SERPL-MCNC: 85 MG/DL (ref 65–99)
HCT VFR BLD AUTO: 39.4 % (ref 34–46.6)
HGB BLD-MCNC: 12.7 G/DL (ref 12–15.9)
IMM GRANULOCYTES # BLD AUTO: 0.01 10*3/MM3 (ref 0–0.05)
IMM GRANULOCYTES NFR BLD AUTO: 0.2 % (ref 0–0.5)
LYMPHOCYTES # BLD AUTO: 1.89 10*3/MM3 (ref 0.7–3.1)
LYMPHOCYTES NFR BLD AUTO: 44.7 % (ref 19.6–45.3)
MCH RBC QN AUTO: 29.2 PG (ref 26.6–33)
MCHC RBC AUTO-ENTMCNC: 32.2 G/DL (ref 31.5–35.7)
MCV RBC AUTO: 90.6 FL (ref 79–97)
MONOCYTES # BLD AUTO: 0.6 10*3/MM3 (ref 0.1–0.9)
MONOCYTES NFR BLD AUTO: 14.2 % (ref 5–12)
NEUTROPHILS NFR BLD AUTO: 1.71 10*3/MM3 (ref 1.7–7)
NEUTROPHILS NFR BLD AUTO: 40.5 % (ref 42.7–76)
NRBC BLD AUTO-RTO: 0 /100 WBC (ref 0–0.2)
PLATELET # BLD AUTO: 160 10*3/MM3 (ref 140–450)
PMV BLD AUTO: 9.9 FL (ref 6–12)
POTASSIUM SERPL-SCNC: 3.2 MMOL/L (ref 3.5–5.2)
PROCALCITONIN SERPL-MCNC: 0.05 NG/ML (ref 0–0.25)
PROT SERPL-MCNC: 5.8 G/DL (ref 6–8.5)
RBC # BLD AUTO: 4.35 10*6/MM3 (ref 3.77–5.28)
SODIUM SERPL-SCNC: 139 MMOL/L (ref 136–145)
WBC NRBC COR # BLD: 4.23 10*3/MM3 (ref 3.4–10.8)

## 2023-01-30 PROCEDURE — 25010000002 REMDESIVIR 100 MG/20ML SOLUTION 1 EACH VIAL: Performed by: INTERNAL MEDICINE

## 2023-01-30 PROCEDURE — 25010000002 CEFTRIAXONE PER 250 MG: Performed by: STUDENT IN AN ORGANIZED HEALTH CARE EDUCATION/TRAINING PROGRAM

## 2023-01-30 PROCEDURE — 25010000002 ENOXAPARIN PER 10 MG: Performed by: INTERNAL MEDICINE

## 2023-01-30 PROCEDURE — 84145 PROCALCITONIN (PCT): CPT | Performed by: INTERNAL MEDICINE

## 2023-01-30 PROCEDURE — 82248 BILIRUBIN DIRECT: CPT | Performed by: INTERNAL MEDICINE

## 2023-01-30 PROCEDURE — 87040 BLOOD CULTURE FOR BACTERIA: CPT | Performed by: STUDENT IN AN ORGANIZED HEALTH CARE EDUCATION/TRAINING PROGRAM

## 2023-01-30 PROCEDURE — 83605 ASSAY OF LACTIC ACID: CPT | Performed by: INTERNAL MEDICINE

## 2023-01-30 PROCEDURE — 25010000002 SODIUM CHLORIDE 0.9 % WITH KCL 20 MEQ 20-0.9 MEQ/L-% SOLUTION: Performed by: STUDENT IN AN ORGANIZED HEALTH CARE EDUCATION/TRAINING PROGRAM

## 2023-01-30 PROCEDURE — 25010000002 DEXAMETHASONE PER 1 MG: Performed by: INTERNAL MEDICINE

## 2023-01-30 PROCEDURE — 80053 COMPREHEN METABOLIC PANEL: CPT | Performed by: INTERNAL MEDICINE

## 2023-01-30 PROCEDURE — 25010000002 SODIUM CHLORIDE 0.9 % WITH KCL 20 MEQ 20-0.9 MEQ/L-% SOLUTION: Performed by: INTERNAL MEDICINE

## 2023-01-30 PROCEDURE — 85025 COMPLETE CBC W/AUTO DIFF WBC: CPT | Performed by: INTERNAL MEDICINE

## 2023-01-30 PROCEDURE — 99223 1ST HOSP IP/OBS HIGH 75: CPT | Performed by: STUDENT IN AN ORGANIZED HEALTH CARE EDUCATION/TRAINING PROGRAM

## 2023-01-30 RX ORDER — GUAIFENESIN 600 MG/1
600 TABLET, EXTENDED RELEASE ORAL EVERY 12 HOURS SCHEDULED
Status: DISCONTINUED | OUTPATIENT
Start: 2023-01-30 | End: 2023-01-31

## 2023-01-30 RX ORDER — FAMOTIDINE 20 MG/1
20 TABLET, FILM COATED ORAL
Status: DISCONTINUED | OUTPATIENT
Start: 2023-01-30 | End: 2023-02-02 | Stop reason: HOSPADM

## 2023-01-30 RX ORDER — BENZONATATE 100 MG/1
100 CAPSULE ORAL 3 TIMES DAILY PRN
Status: DISCONTINUED | OUTPATIENT
Start: 2023-01-30 | End: 2023-02-02 | Stop reason: HOSPADM

## 2023-01-30 RX ORDER — POTASSIUM CHLORIDE 750 MG/1
40 TABLET, FILM COATED, EXTENDED RELEASE ORAL ONCE
Status: COMPLETED | OUTPATIENT
Start: 2023-01-30 | End: 2023-01-30

## 2023-01-30 RX ADMIN — BUSPIRONE HYDROCHLORIDE 5 MG: 5 TABLET ORAL at 10:05

## 2023-01-30 RX ADMIN — POTASSIUM CHLORIDE AND SODIUM CHLORIDE 75 ML/HR: 900; 150 INJECTION, SOLUTION INTRAVENOUS at 12:31

## 2023-01-30 RX ADMIN — POTASSIUM CHLORIDE 40 MEQ: 750 TABLET, EXTENDED RELEASE ORAL at 10:05

## 2023-01-30 RX ADMIN — CARVEDILOL 6.25 MG: 6.25 TABLET, FILM COATED ORAL at 10:05

## 2023-01-30 RX ADMIN — GUAIFENESIN 600 MG: 600 TABLET, EXTENDED RELEASE ORAL at 12:31

## 2023-01-30 RX ADMIN — Medication 10 ML: at 22:55

## 2023-01-30 RX ADMIN — POTASSIUM CHLORIDE AND SODIUM CHLORIDE 100 ML/HR: 900; 150 INJECTION, SOLUTION INTRAVENOUS at 00:00

## 2023-01-30 RX ADMIN — BENZONATATE 100 MG: 100 CAPSULE ORAL at 12:31

## 2023-01-30 RX ADMIN — BUSPIRONE HYDROCHLORIDE 5 MG: 5 TABLET ORAL at 16:50

## 2023-01-30 RX ADMIN — Medication 10 ML: at 00:02

## 2023-01-30 RX ADMIN — FAMOTIDINE 20 MG: 20 TABLET, FILM COATED ORAL at 16:50

## 2023-01-30 RX ADMIN — ROSUVASTATIN CALCIUM 20 MG: 20 TABLET, FILM COATED ORAL at 00:01

## 2023-01-30 RX ADMIN — REMDESIVIR 100 MG: 100 INJECTION, POWDER, LYOPHILIZED, FOR SOLUTION INTRAVENOUS at 22:54

## 2023-01-30 RX ADMIN — ACETAMINOPHEN 650 MG: 325 TABLET, FILM COATED ORAL at 02:24

## 2023-01-30 RX ADMIN — DULOXETINE HYDROCHLORIDE 60 MG: 60 CAPSULE, DELAYED RELEASE ORAL at 10:05

## 2023-01-30 RX ADMIN — GUAIFENESIN 600 MG: 600 TABLET, EXTENDED RELEASE ORAL at 22:54

## 2023-01-30 RX ADMIN — ROSUVASTATIN CALCIUM 20 MG: 20 TABLET, FILM COATED ORAL at 22:54

## 2023-01-30 RX ADMIN — CARVEDILOL 6.25 MG: 6.25 TABLET, FILM COATED ORAL at 00:01

## 2023-01-30 RX ADMIN — BUSPIRONE HYDROCHLORIDE 5 MG: 5 TABLET ORAL at 22:54

## 2023-01-30 RX ADMIN — CEFTRIAXONE 2 G: 2 INJECTION, POWDER, FOR SOLUTION INTRAMUSCULAR; INTRAVENOUS at 16:51

## 2023-01-30 RX ADMIN — ASPIRIN 81 MG: 81 TABLET, COATED ORAL at 10:05

## 2023-01-30 RX ADMIN — BUSPIRONE HYDROCHLORIDE 5 MG: 5 TABLET ORAL at 00:01

## 2023-01-30 RX ADMIN — Medication 1000 UNITS: at 10:05

## 2023-01-30 RX ADMIN — REMDESIVIR 200 MG: 100 INJECTION, POWDER, LYOPHILIZED, FOR SOLUTION INTRAVENOUS at 02:23

## 2023-01-30 RX ADMIN — FAMOTIDINE 20 MG: 20 TABLET, FILM COATED ORAL at 12:31

## 2023-01-30 RX ADMIN — LISINOPRIL 40 MG: 40 TABLET ORAL at 10:05

## 2023-01-30 RX ADMIN — ENOXAPARIN SODIUM 40 MG: 100 INJECTION SUBCUTANEOUS at 00:01

## 2023-01-30 RX ADMIN — CARVEDILOL 6.25 MG: 6.25 TABLET, FILM COATED ORAL at 16:50

## 2023-01-30 RX ADMIN — DEXAMETHASONE SODIUM PHOSPHATE 6 MG: 10 INJECTION INTRAMUSCULAR; INTRAVENOUS at 10:03

## 2023-01-30 RX ADMIN — MULTIPLE VITAMINS W/ MINERALS TAB 1 TABLET: TAB at 10:05

## 2023-01-31 PROBLEM — A40.9 STREPTOCOCCAL SEPTICEMIA: Status: ACTIVE | Noted: 2023-01-31

## 2023-01-31 LAB
ALBUMIN SERPL-MCNC: 3.3 G/DL (ref 3.5–5.2)
ALBUMIN/GLOB SERPL: 1.4 G/DL
ALP SERPL-CCNC: 59 U/L (ref 39–117)
ALT SERPL W P-5'-P-CCNC: 10 U/L (ref 1–33)
ANION GAP SERPL CALCULATED.3IONS-SCNC: 6.5 MMOL/L (ref 5–15)
AST SERPL-CCNC: 17 U/L (ref 1–32)
BILIRUB SERPL-MCNC: 0.2 MG/DL (ref 0–1.2)
BUN SERPL-MCNC: 17 MG/DL (ref 8–23)
BUN/CREAT SERPL: 28.8 (ref 7–25)
CALCIUM SPEC-SCNC: 8.3 MG/DL (ref 8.6–10.5)
CHLORIDE SERPL-SCNC: 108 MMOL/L (ref 98–107)
CO2 SERPL-SCNC: 22.5 MMOL/L (ref 22–29)
CREAT SERPL-MCNC: 0.59 MG/DL (ref 0.57–1)
CRP SERPL-MCNC: 1.21 MG/DL (ref 0–0.5)
DEPRECATED RDW RBC AUTO: 41.3 FL (ref 37–54)
EGFRCR SERPLBLD CKD-EPI 2021: 92.4 ML/MIN/1.73
ERYTHROCYTE [DISTWIDTH] IN BLOOD BY AUTOMATED COUNT: 12.6 % (ref 12.3–15.4)
GLOBULIN UR ELPH-MCNC: 2.3 GM/DL
GLUCOSE SERPL-MCNC: 96 MG/DL (ref 65–99)
HCT VFR BLD AUTO: 36.2 % (ref 34–46.6)
HGB BLD-MCNC: 11.9 G/DL (ref 12–15.9)
MCH RBC QN AUTO: 29.8 PG (ref 26.6–33)
MCHC RBC AUTO-ENTMCNC: 32.9 G/DL (ref 31.5–35.7)
MCV RBC AUTO: 90.7 FL (ref 79–97)
PLATELET # BLD AUTO: 158 10*3/MM3 (ref 140–450)
PMV BLD AUTO: 9.8 FL (ref 6–12)
POTASSIUM SERPL-SCNC: 4.3 MMOL/L (ref 3.5–5.2)
PROT SERPL-MCNC: 5.6 G/DL (ref 6–8.5)
RBC # BLD AUTO: 3.99 10*6/MM3 (ref 3.77–5.28)
SODIUM SERPL-SCNC: 137 MMOL/L (ref 136–145)
WBC NRBC COR # BLD: 2.77 10*3/MM3 (ref 3.4–10.8)

## 2023-01-31 PROCEDURE — 99232 SBSQ HOSP IP/OBS MODERATE 35: CPT | Performed by: STUDENT IN AN ORGANIZED HEALTH CARE EDUCATION/TRAINING PROGRAM

## 2023-01-31 PROCEDURE — 80053 COMPREHEN METABOLIC PANEL: CPT | Performed by: STUDENT IN AN ORGANIZED HEALTH CARE EDUCATION/TRAINING PROGRAM

## 2023-01-31 PROCEDURE — 25010000002 DEXAMETHASONE PER 1 MG: Performed by: INTERNAL MEDICINE

## 2023-01-31 PROCEDURE — 86140 C-REACTIVE PROTEIN: CPT | Performed by: STUDENT IN AN ORGANIZED HEALTH CARE EDUCATION/TRAINING PROGRAM

## 2023-01-31 PROCEDURE — 97530 THERAPEUTIC ACTIVITIES: CPT

## 2023-01-31 PROCEDURE — 25010000002 REMDESIVIR 100 MG/20ML SOLUTION 1 EACH VIAL: Performed by: INTERNAL MEDICINE

## 2023-01-31 PROCEDURE — 25010000002 SODIUM CHLORIDE 0.9 % WITH KCL 20 MEQ 20-0.9 MEQ/L-% SOLUTION: Performed by: STUDENT IN AN ORGANIZED HEALTH CARE EDUCATION/TRAINING PROGRAM

## 2023-01-31 PROCEDURE — 25010000002 CEFTRIAXONE PER 250 MG: Performed by: STUDENT IN AN ORGANIZED HEALTH CARE EDUCATION/TRAINING PROGRAM

## 2023-01-31 PROCEDURE — 85027 COMPLETE CBC AUTOMATED: CPT | Performed by: STUDENT IN AN ORGANIZED HEALTH CARE EDUCATION/TRAINING PROGRAM

## 2023-01-31 PROCEDURE — 25010000002 HYDRALAZINE PER 20 MG: Performed by: STUDENT IN AN ORGANIZED HEALTH CARE EDUCATION/TRAINING PROGRAM

## 2023-01-31 PROCEDURE — 97162 PT EVAL MOD COMPLEX 30 MIN: CPT

## 2023-01-31 PROCEDURE — 25010000002 ENOXAPARIN PER 10 MG: Performed by: INTERNAL MEDICINE

## 2023-01-31 RX ORDER — GUAIFENESIN AND DEXTROMETHORPHAN HYDROBROMIDE 600; 30 MG/1; MG/1
1 TABLET, EXTENDED RELEASE ORAL 2 TIMES DAILY PRN
Status: DISCONTINUED | OUTPATIENT
Start: 2023-01-31 | End: 2023-02-02 | Stop reason: HOSPADM

## 2023-01-31 RX ORDER — CARVEDILOL 12.5 MG/1
12.5 TABLET ORAL 2 TIMES DAILY WITH MEALS
Status: DISCONTINUED | OUTPATIENT
Start: 2023-01-31 | End: 2023-02-02 | Stop reason: HOSPADM

## 2023-01-31 RX ORDER — HYDRALAZINE HYDROCHLORIDE 20 MG/ML
10 INJECTION INTRAMUSCULAR; INTRAVENOUS EVERY 6 HOURS PRN
Status: DISCONTINUED | OUTPATIENT
Start: 2023-01-31 | End: 2023-02-02 | Stop reason: HOSPADM

## 2023-01-31 RX ORDER — GUAIFENESIN 600 MG/1
600 TABLET, EXTENDED RELEASE ORAL 2 TIMES DAILY PRN
Status: DISCONTINUED | OUTPATIENT
Start: 2023-01-31 | End: 2023-02-02 | Stop reason: HOSPADM

## 2023-01-31 RX ADMIN — Medication 10 ML: at 20:33

## 2023-01-31 RX ADMIN — POTASSIUM CHLORIDE AND SODIUM CHLORIDE 75 ML/HR: 900; 150 INJECTION, SOLUTION INTRAVENOUS at 01:27

## 2023-01-31 RX ADMIN — ACETAMINOPHEN 650 MG: 325 TABLET, FILM COATED ORAL at 20:32

## 2023-01-31 RX ADMIN — DEXAMETHASONE SODIUM PHOSPHATE 6 MG: 10 INJECTION INTRAMUSCULAR; INTRAVENOUS at 08:38

## 2023-01-31 RX ADMIN — ENOXAPARIN SODIUM 40 MG: 100 INJECTION SUBCUTANEOUS at 08:38

## 2023-01-31 RX ADMIN — GUAIFENESIN 600 MG: 600 TABLET, EXTENDED RELEASE ORAL at 08:37

## 2023-01-31 RX ADMIN — BUSPIRONE HYDROCHLORIDE 5 MG: 5 TABLET ORAL at 20:32

## 2023-01-31 RX ADMIN — MULTIPLE VITAMINS W/ MINERALS TAB 1 TABLET: TAB at 08:37

## 2023-01-31 RX ADMIN — FAMOTIDINE 20 MG: 20 TABLET, FILM COATED ORAL at 17:52

## 2023-01-31 RX ADMIN — Medication 1000 UNITS: at 08:37

## 2023-01-31 RX ADMIN — BUSPIRONE HYDROCHLORIDE 5 MG: 5 TABLET ORAL at 17:52

## 2023-01-31 RX ADMIN — REMDESIVIR 100 MG: 100 INJECTION, POWDER, LYOPHILIZED, FOR SOLUTION INTRAVENOUS at 23:28

## 2023-01-31 RX ADMIN — BUSPIRONE HYDROCHLORIDE 5 MG: 5 TABLET ORAL at 08:37

## 2023-01-31 RX ADMIN — HYDRALAZINE HYDROCHLORIDE 10 MG: 20 INJECTION INTRAMUSCULAR; INTRAVENOUS at 18:57

## 2023-01-31 RX ADMIN — CARVEDILOL 12.5 MG: 12.5 TABLET, FILM COATED ORAL at 12:03

## 2023-01-31 RX ADMIN — DULOXETINE HYDROCHLORIDE 60 MG: 60 CAPSULE, DELAYED RELEASE ORAL at 08:37

## 2023-01-31 RX ADMIN — ASPIRIN 81 MG: 81 TABLET, COATED ORAL at 08:37

## 2023-01-31 RX ADMIN — FAMOTIDINE 20 MG: 20 TABLET, FILM COATED ORAL at 08:37

## 2023-01-31 RX ADMIN — ROSUVASTATIN CALCIUM 20 MG: 20 TABLET, FILM COATED ORAL at 20:32

## 2023-01-31 RX ADMIN — LISINOPRIL 40 MG: 40 TABLET ORAL at 08:37

## 2023-01-31 RX ADMIN — CARVEDILOL 12.5 MG: 12.5 TABLET, FILM COATED ORAL at 17:52

## 2023-01-31 RX ADMIN — CEFTRIAXONE 2 G: 2 INJECTION, POWDER, FOR SOLUTION INTRAMUSCULAR; INTRAVENOUS at 17:52

## 2023-02-01 LAB
ALBUMIN SERPL-MCNC: 3.5 G/DL (ref 3.5–5.2)
ALBUMIN/GLOB SERPL: 1.4 G/DL
ALP SERPL-CCNC: 66 U/L (ref 39–117)
ALT SERPL W P-5'-P-CCNC: 12 U/L (ref 1–33)
ANION GAP SERPL CALCULATED.3IONS-SCNC: 7.2 MMOL/L (ref 5–15)
AST SERPL-CCNC: 21 U/L (ref 1–32)
BILIRUB SERPL-MCNC: 0.3 MG/DL (ref 0–1.2)
BUN SERPL-MCNC: 14 MG/DL (ref 8–23)
BUN/CREAT SERPL: 21.9 (ref 7–25)
CALCIUM SPEC-SCNC: 8.6 MG/DL (ref 8.6–10.5)
CHLORIDE SERPL-SCNC: 106 MMOL/L (ref 98–107)
CO2 SERPL-SCNC: 26.8 MMOL/L (ref 22–29)
CREAT SERPL-MCNC: 0.64 MG/DL (ref 0.57–1)
CRP SERPL-MCNC: 0.64 MG/DL (ref 0–0.5)
DEPRECATED RDW RBC AUTO: 41.2 FL (ref 37–54)
EGFRCR SERPLBLD CKD-EPI 2021: 90.6 ML/MIN/1.73
ERYTHROCYTE [DISTWIDTH] IN BLOOD BY AUTOMATED COUNT: 12.7 % (ref 12.3–15.4)
GLOBULIN UR ELPH-MCNC: 2.5 GM/DL
GLUCOSE SERPL-MCNC: 112 MG/DL (ref 65–99)
HCT VFR BLD AUTO: 38.3 % (ref 34–46.6)
HGB BLD-MCNC: 12.8 G/DL (ref 12–15.9)
MCH RBC QN AUTO: 29.7 PG (ref 26.6–33)
MCHC RBC AUTO-ENTMCNC: 33.4 G/DL (ref 31.5–35.7)
MCV RBC AUTO: 88.9 FL (ref 79–97)
PLATELET # BLD AUTO: 204 10*3/MM3 (ref 140–450)
PMV BLD AUTO: 9.6 FL (ref 6–12)
POTASSIUM SERPL-SCNC: 3.7 MMOL/L (ref 3.5–5.2)
PROT SERPL-MCNC: 6 G/DL (ref 6–8.5)
RBC # BLD AUTO: 4.31 10*6/MM3 (ref 3.77–5.28)
SODIUM SERPL-SCNC: 140 MMOL/L (ref 136–145)
WBC NRBC COR # BLD: 4.31 10*3/MM3 (ref 3.4–10.8)

## 2023-02-01 PROCEDURE — 25010000002 REMDESIVIR 100 MG/20ML SOLUTION 1 EACH VIAL: Performed by: INTERNAL MEDICINE

## 2023-02-01 PROCEDURE — 80053 COMPREHEN METABOLIC PANEL: CPT | Performed by: STUDENT IN AN ORGANIZED HEALTH CARE EDUCATION/TRAINING PROGRAM

## 2023-02-01 PROCEDURE — 85027 COMPLETE CBC AUTOMATED: CPT | Performed by: STUDENT IN AN ORGANIZED HEALTH CARE EDUCATION/TRAINING PROGRAM

## 2023-02-01 PROCEDURE — 25010000002 DEXAMETHASONE PER 1 MG: Performed by: INTERNAL MEDICINE

## 2023-02-01 PROCEDURE — 25010000002 CEFTRIAXONE PER 250 MG: Performed by: STUDENT IN AN ORGANIZED HEALTH CARE EDUCATION/TRAINING PROGRAM

## 2023-02-01 PROCEDURE — 86140 C-REACTIVE PROTEIN: CPT | Performed by: STUDENT IN AN ORGANIZED HEALTH CARE EDUCATION/TRAINING PROGRAM

## 2023-02-01 PROCEDURE — 99232 SBSQ HOSP IP/OBS MODERATE 35: CPT | Performed by: STUDENT IN AN ORGANIZED HEALTH CARE EDUCATION/TRAINING PROGRAM

## 2023-02-01 PROCEDURE — 25010000002 ENOXAPARIN PER 10 MG: Performed by: INTERNAL MEDICINE

## 2023-02-01 RX ADMIN — DULOXETINE HYDROCHLORIDE 60 MG: 60 CAPSULE, DELAYED RELEASE ORAL at 08:24

## 2023-02-01 RX ADMIN — ACETAMINOPHEN 650 MG: 325 TABLET, FILM COATED ORAL at 10:45

## 2023-02-01 RX ADMIN — BUSPIRONE HYDROCHLORIDE 5 MG: 5 TABLET ORAL at 18:11

## 2023-02-01 RX ADMIN — MULTIPLE VITAMINS W/ MINERALS TAB 1 TABLET: TAB at 08:23

## 2023-02-01 RX ADMIN — ASPIRIN 81 MG: 81 TABLET, COATED ORAL at 08:24

## 2023-02-01 RX ADMIN — REMDESIVIR 100 MG: 100 INJECTION, POWDER, LYOPHILIZED, FOR SOLUTION INTRAVENOUS at 21:58

## 2023-02-01 RX ADMIN — FAMOTIDINE 20 MG: 20 TABLET, FILM COATED ORAL at 18:11

## 2023-02-01 RX ADMIN — BUSPIRONE HYDROCHLORIDE 5 MG: 5 TABLET ORAL at 21:58

## 2023-02-01 RX ADMIN — CARVEDILOL 12.5 MG: 12.5 TABLET, FILM COATED ORAL at 18:11

## 2023-02-01 RX ADMIN — CEFTRIAXONE 2 G: 2 INJECTION, POWDER, FOR SOLUTION INTRAMUSCULAR; INTRAVENOUS at 18:11

## 2023-02-01 RX ADMIN — ROSUVASTATIN CALCIUM 20 MG: 20 TABLET, FILM COATED ORAL at 21:58

## 2023-02-01 RX ADMIN — DEXAMETHASONE SODIUM PHOSPHATE 6 MG: 10 INJECTION INTRAMUSCULAR; INTRAVENOUS at 08:24

## 2023-02-01 RX ADMIN — Medication 1000 UNITS: at 08:24

## 2023-02-01 RX ADMIN — BUSPIRONE HYDROCHLORIDE 5 MG: 5 TABLET ORAL at 08:24

## 2023-02-01 RX ADMIN — CARVEDILOL 12.5 MG: 12.5 TABLET, FILM COATED ORAL at 08:23

## 2023-02-01 RX ADMIN — FAMOTIDINE 20 MG: 20 TABLET, FILM COATED ORAL at 08:24

## 2023-02-01 RX ADMIN — LISINOPRIL 40 MG: 40 TABLET ORAL at 08:24

## 2023-02-01 RX ADMIN — Medication 10 ML: at 08:25

## 2023-02-01 RX ADMIN — Medication 10 ML: at 21:58

## 2023-02-01 RX ADMIN — ENOXAPARIN SODIUM 40 MG: 100 INJECTION SUBCUTANEOUS at 08:24

## 2023-02-01 NOTE — PROGRESS NOTES
LOS: 2 days     Chief Complaint: COVID-19, positive blood culture    Interval History: Patient continues to report some fatigue and cough.  Denies any change in shortness of breath.  Denies any fevers.  Tolerating medications well per her report.    Vital Signs  Temp:  [96.9 °F (36.1 °C)-97.8 °F (36.6 °C)] 97.8 °F (36.6 °C)  Heart Rate:  [64-90] 70  Resp:  [16] 16  BP: (158-188)/(80-92) 162/86    Physical Exam:  General: In no acute distress  HEENT: Oropharynx clear, moist mucous membranes  Cardiovascular: RRR  Respiratory: Normal work of breathing.  No wheezing.  GI: Soft, NT/ND  Skin: No rashes or lesions  Extremities: No edema, cyanosis  Access: Peripheral IV.    Antibiotics:  Anti-Infectives (From admission, onward)    Ordered     Dose/Rate Route Frequency Start Stop    01/29/23 2149  remdesivir 100 mg in sodium chloride 0.9 % 250 mL IVPB (powder vial)        Ordering Provider: Gabriela Mcgraw MD   See Hyperspace for full Linked Orders Report.    100 mg  over 60 Minutes Intravenous Every 24 Hours 01/30/23 2234 02/03/23 2234    01/30/23 1205  cefTRIAXone (ROCEPHIN) 2 g in sodium chloride 0.9 % 100 mL IVPB-VTB        Ordering Provider: Urbano Avelar DO    2 g  200 mL/hr over 30 Minutes Intravenous Every 24 Hours 01/30/23 1730 02/04/23 1729    01/29/23 2149  remdesivir 200 mg in sodium chloride 0.9 % 290 mL IVPB (powder vial)        Ordering Provider: Gabriela Mcgraw MD   See Hyperspace for full Linked Orders Report.    200 mg  over 60 Minutes Intravenous Every 24 Hours 01/29/23 2245 01/30/23 0323    01/29/23 1644  cefTRIAXone (ROCEPHIN) 2 g in sodium chloride 0.9 % 100 mL IVPB-VTB        Ordering Provider: Clarissa Quinteros PA    2 g  200 mL/hr over 30 Minutes Intravenous Once 01/29/23 1646 01/29/23 1754           Results Review:     I reviewed the patient's new clinical results.    Lab Results   Component Value Date    WBC 4.31 02/01/2023    HGB 12.8 02/01/2023    HCT 38.3 02/01/2023    MCV 88.9 02/01/2023      02/01/2023     Lab Results   Component Value Date    GLUCOSE 112 (H) 02/01/2023    BUN 14 02/01/2023    CREATININE 0.64 02/01/2023    EGFRIFNONA 76 09/22/2021    EGFRIFAFRI 88 09/22/2021    BCR 21.9 02/01/2023    CO2 26.8 02/01/2023    CALCIUM 8.6 02/01/2023    PROTENTOTREF 6.7 01/19/2023    ALBUMIN 3.5 02/01/2023    LABIL2 1.6 01/19/2023    AST 21 02/01/2023    ALT 12 02/01/2023       Microbiology:  1/29 respiratory panel positive for COVID-19  1/29 urine culture no growth to date  1/29 blood cultures 1 out of 2 Streptococcus alactolyticus  1/30 blood cultures no growth to date    Radiology:  No new imaging    Assessment    #Strep septicemia  #COVID-19 infection  #Acute hypoxic respiratory failure    Repeat blood cultures negative to date.  Plan to follow to document sterility.  Continue ceftriaxone 2 g daily.  Plan to touch base with microbiology in regards to oral options for discharge in the near future.    For COVID-19, continue remdesivir 100 mg daily for 5 days.  Continue supportive care.  Suggest discontinuing steroids as patient has come off supplemental oxygen.    ID will follow.

## 2023-02-01 NOTE — PROGRESS NOTES
Name: Gita Avelar ADMIT: 2023   : 1945  PCP: Ernie Wright APRN    MRN: [id number] LOS: 2 days   AGE/SEX: 78 y.o. female  ROOM: [facility]     Subjective   Subjective   Nocturnist overnight.  Remains on room air.  Mild shortness of breath, cough is improving.  Feels tired.  No fevers, chills, nausea vomiting vomiting.  No chest palpitations    Review of Systems   As above  Objective   Objective   Vital Signs  Temp:  [96.9 °F (36.1 °C)-97.8 °F (36.6 °C)] 97.8 °F (36.6 °C)  Heart Rate:  [64-90] 70  Resp:  [16] 16  BP: (158-188)/(80-92) 162/86  SpO2:  [92 %-96 %] 95 %  on  Flow (L/min):  [1] 1;   Device (Oxygen Therapy): room air  Body mass index is 22.31 kg/m².  Physical Exam    General: Alert, laying in bed, not distressed,  HEENT: Normocephalic, atraumatic  CV: RRR, no murmurs  Lungs: CTA, no wheezing, on room air  abdomen: Soft, nontender, nondistended  Extremities: No significant peripheral edema , no cyanosis,  Results Review     I reviewed the patient's new clinical results.  Results from last 7 days   Lab Units 23  0602 23  0625 23  0540 23  1507   WBC 10*3/mm3 4.31 2.77* 4.23 4.84   HEMOGLOBIN g/dL 12.8 11.9* 12.7 15.0   PLATELETS 10*3/mm3 204 158 160 178     Results from last 7 days   Lab Units 23  0602 23  0625 23  0540 23  1507   SODIUM mmol/L 140 137 139 140   POTASSIUM mmol/L 3.7 4.3 3.2* 3.4*   CHLORIDE mmol/L 106 108* 103 100   CO2 mmol/L 26.8 22.5 25.7 29.3*   BUN mg/dL 14 17 15 16   CREATININE mg/dL 0.64 0.59 0.76 0.87   GLUCOSE mg/dL 112* 96 85 93   Estimated Creatinine Clearance: 67.5 mL/min (by C-G formula based on SCr of 0.64 mg/dL).  Results from last 7 days   Lab Units 23  0602 23  0625 23  0540 23  1507   ALBUMIN g/dL 3.5 3.3* 3.2* 4.0   BILIRUBIN mg/dL 0.3 0.2 0.3 0.7   ALK PHOS U/L 66 59 66 83   AST (SGOT) U/L 21 17 18 20   ALT (SGPT) U/L 12 10 11 12     Results from last 7 days   Lab Units  02/01/23  0602 01/31/23  0625 01/30/23  0540 01/29/23  1507   CALCIUM mg/dL 8.6 8.3* 8.2* 9.2   ALBUMIN g/dL 3.5 3.3* 3.2* 4.0   MAGNESIUM mg/dL  --   --   --  2.0     Results from last 7 days   Lab Units 01/30/23  0540   PROCALCITONIN ng/mL 0.05   LACTATE mmol/L 0.8     COVID19   Date Value Ref Range Status   01/29/2023 Detected (C) Not Detected - Ref. Range Final     No results found for: HGBA1C, POCGLU    XR Chest 1 View  Narrative: XR CHEST 1 VW-     HISTORY: Female who is 78 years-old,  cough, short of breath     TECHNIQUE: Frontal views of the chest     COMPARISON: None available     FINDINGS: The heart is enlarged. Aorta is tortuous, calcified. Pulmonary  vasculature is unremarkable. No focal pulmonary consolidation, pleural  effusion, or pneumothorax. No acute osseous process.     Impression: No focal pulmonary consolidation. Cardiomegaly. Tortuous  aorta. Follow-up as clinically indicated.     This report was finalized on 1/29/2023 4:02 PM by Dr. Olaf Cortez M.D.       Scheduled Medications  aspirin, 81 mg, Oral, Daily  busPIRone, 5 mg, Oral, TID  carvedilol, 12.5 mg, Oral, BID With Meals  cefTRIAXone, 2 g, Intravenous, Q24H  cholecalciferol, 1,000 Units, Oral, Daily  DULoxetine, 60 mg, Oral, Daily  enoxaparin, 40 mg, Subcutaneous, Daily  famotidine, 20 mg, Oral, BID AC  lisinopril, 40 mg, Oral, Daily  multivitamin with minerals, 1 tablet, Oral, Daily  remdesivir, 100 mg, Intravenous, Q24H  rosuvastatin, 20 mg, Oral, Nightly  sodium chloride, 10 mL, Intravenous, Q12H    Infusions   Diet  Diet: Cardiac Diets; Healthy Heart (2-3 Na+); Texture: Regular Texture (IDDSI 7); Fluid Consistency: Thin (IDDSI 0)       Assessment/Plan     Active Hospital Problems    Diagnosis  POA   • **Acute UTI [N39.0]  Yes   • Streptococcal septicemia (HCC) [A40.9]  Unknown   • COVID-19 virus infection [U07.1]  Yes   • Hypoxia [R09.02]  Unknown   • Essential hypertension [I10]  Yes      Resolved Hospital Problems   No  resolved problems to display.         Patient is a 78-year-old female with a past medical history of  hypertension, depression, macular degeneration of the left eye, prior history of subdural hematoma and Guillain-Barré syndrome and stress incontinence ,arthritis presented to the ED with complaints of started having progressive weakness associated nausea and vomiting and mild cough..      COVID-19/hypoxia:   Chest x-ray with no acute findings.   Continue remdesivir to complete 5-day course,  Steroids discontinued as she is on room air now   pulmonary hygiene, incentive spirometry,  Monitor labs, inflammatory markers.  CRP trending down.  Scheduled Mucinex DM for cough.  DuoNebs to help with mucus clearance.    Strep septicemia: Blood cultures 1/29 1 of 2 was positive for Streptococcus.  ID following.  On IV ceftriaxone 2 g every 24 hours.  Repeat blood cultures 01/3 0 negative to date.      UTI: Urinalysis with positive for nitrates, leukocytes, 4+ bacteria, 21-40 WBC.  However urine culture was negative.  On IV ceftriaxone for septicemia.    Generalized progressive weakness: Secondary to above.  PT OT.      Essential hypertension: Better today.  On home lisinopril and increased dose of carvedilol 12.5 twice daily.  Continue to monitor.      Hyperlipidemia: Continue statin    Leukopenia: Secondary to COVID/septicemia.  Monitor daily.  Improved today.    · Lovenox 40 mg SC daily for DVT prophylaxis.  · Full code.  · Discussed with patient.  · Discussed with RN  Anticipate discharge home, may need home health.  Likely tomorrow pending repeat blood cultures and final recs from ID.      I wore protective equipment throughout this patient encounter including a face mask, gloves and protective eyewear.  Hand hygiene was performed before donning protective equipment and after removal when leaving the room.      Dictated utilizing Dragon dictation        Amirah Carvajal MD  [facility]  02/01/23  09:49 EST

## 2023-02-01 NOTE — NURSING NOTE
Around 10:33 am the NA called this RN to inform that the patient's food tray fell off of the side table and onto her foot and that the patient was bleeding. After assessing the patient her left big toe was slightly bruised and a blood blister was present. The blood blister had a small amount of bloody drainage oozing from it. The blister and toe were cleaned with warm water and a wash cloth, a band aid and bacitracin was applied. No swelling noted. The patient reported no increased pain, the MD was notified and there was no further testing ordered.

## 2023-02-01 NOTE — PLAN OF CARE
Goal Outcome Evaluation:  Plan of Care Reviewed With: patient        Progress: improving  Outcome Evaluation: VSS, up to bathroom or BSC w/ assist x1, bed alarm on, on room air, COVID isolation maintained, self turns in bed, KVO fluids, tolerating regular diet, tylenol for back ache, pt hopeful for discharge in the next day or two, dry cough less frequent than previous shifts. Patient reports feeling better than she did yesterday.

## 2023-02-01 NOTE — PLAN OF CARE
Goal Outcome Evaluation:  Plan of Care Reviewed With: patient           Outcome Evaluation: VSS, up to BSC asst x1, bed alarm on and falls precautions for safety (dosen't call for help), isolation continued, no nausea, c/o headache- given tylenol, dry cough noted, given IV remdesivir, IVF at KVO, had new IV placed, tolerating diet, will continue to monitor.

## 2023-02-02 ENCOUNTER — READMISSION MANAGEMENT (OUTPATIENT)
Dept: CALL CENTER | Facility: HOSPITAL | Age: 78
End: 2023-02-02
Payer: MEDICARE

## 2023-02-02 ENCOUNTER — HOME HEALTH ADMISSION (OUTPATIENT)
Dept: HOME HEALTH SERVICES | Facility: HOME HEALTHCARE | Age: 78
End: 2023-02-02
Payer: MEDICARE

## 2023-02-02 VITALS
WEIGHT: 130 LBS | TEMPERATURE: 97.1 F | BODY MASS INDEX: 22.2 KG/M2 | OXYGEN SATURATION: 96 % | SYSTOLIC BLOOD PRESSURE: 134 MMHG | RESPIRATION RATE: 16 BRPM | DIASTOLIC BLOOD PRESSURE: 80 MMHG | HEIGHT: 64 IN | HEART RATE: 84 BPM

## 2023-02-02 LAB
ALBUMIN SERPL-MCNC: 3.1 G/DL (ref 3.5–5.2)
ALBUMIN/GLOB SERPL: 1.2 G/DL
ALP SERPL-CCNC: 62 U/L (ref 39–117)
ALT SERPL W P-5'-P-CCNC: 13 U/L (ref 1–33)
ANION GAP SERPL CALCULATED.3IONS-SCNC: 9.4 MMOL/L (ref 5–15)
AST SERPL-CCNC: 20 U/L (ref 1–32)
BILIRUB SERPL-MCNC: 0.2 MG/DL (ref 0–1.2)
BUN SERPL-MCNC: 15 MG/DL (ref 8–23)
BUN/CREAT SERPL: 24.6 (ref 7–25)
CALCIUM SPEC-SCNC: 8.5 MG/DL (ref 8.6–10.5)
CHLORIDE SERPL-SCNC: 104 MMOL/L (ref 98–107)
CO2 SERPL-SCNC: 25.6 MMOL/L (ref 22–29)
CREAT SERPL-MCNC: 0.61 MG/DL (ref 0.57–1)
CRP SERPL-MCNC: 0.38 MG/DL (ref 0–0.5)
DEPRECATED RDW RBC AUTO: 41.3 FL (ref 37–54)
EGFRCR SERPLBLD CKD-EPI 2021: 91.6 ML/MIN/1.73
ERYTHROCYTE [DISTWIDTH] IN BLOOD BY AUTOMATED COUNT: 12.9 % (ref 12.3–15.4)
GLOBULIN UR ELPH-MCNC: 2.6 GM/DL
GLUCOSE SERPL-MCNC: 102 MG/DL (ref 65–99)
HCT VFR BLD AUTO: 37.8 % (ref 34–46.6)
HGB BLD-MCNC: 12.3 G/DL (ref 12–15.9)
MCH RBC QN AUTO: 28.7 PG (ref 26.6–33)
MCHC RBC AUTO-ENTMCNC: 32.5 G/DL (ref 31.5–35.7)
MCV RBC AUTO: 88.3 FL (ref 79–97)
PLATELET # BLD AUTO: 223 10*3/MM3 (ref 140–450)
PMV BLD AUTO: 9.7 FL (ref 6–12)
POTASSIUM SERPL-SCNC: 3.3 MMOL/L (ref 3.5–5.2)
PROT SERPL-MCNC: 5.7 G/DL (ref 6–8.5)
RBC # BLD AUTO: 4.28 10*6/MM3 (ref 3.77–5.28)
SODIUM SERPL-SCNC: 139 MMOL/L (ref 136–145)
WBC NRBC COR # BLD: 5.12 10*3/MM3 (ref 3.4–10.8)

## 2023-02-02 PROCEDURE — 25010000002 ENOXAPARIN PER 10 MG: Performed by: INTERNAL MEDICINE

## 2023-02-02 PROCEDURE — 97110 THERAPEUTIC EXERCISES: CPT

## 2023-02-02 PROCEDURE — 80053 COMPREHEN METABOLIC PANEL: CPT | Performed by: STUDENT IN AN ORGANIZED HEALTH CARE EDUCATION/TRAINING PROGRAM

## 2023-02-02 PROCEDURE — 25010000002 REMDESIVIR 100 MG/20ML SOLUTION 1 EACH VIAL: Performed by: INTERNAL MEDICINE

## 2023-02-02 PROCEDURE — 97116 GAIT TRAINING THERAPY: CPT

## 2023-02-02 PROCEDURE — 99232 SBSQ HOSP IP/OBS MODERATE 35: CPT | Performed by: STUDENT IN AN ORGANIZED HEALTH CARE EDUCATION/TRAINING PROGRAM

## 2023-02-02 PROCEDURE — 85027 COMPLETE CBC AUTOMATED: CPT | Performed by: STUDENT IN AN ORGANIZED HEALTH CARE EDUCATION/TRAINING PROGRAM

## 2023-02-02 PROCEDURE — 25010000002 HYDRALAZINE PER 20 MG: Performed by: STUDENT IN AN ORGANIZED HEALTH CARE EDUCATION/TRAINING PROGRAM

## 2023-02-02 PROCEDURE — 86140 C-REACTIVE PROTEIN: CPT | Performed by: STUDENT IN AN ORGANIZED HEALTH CARE EDUCATION/TRAINING PROGRAM

## 2023-02-02 RX ORDER — CEPHALEXIN 500 MG/1
1000 CAPSULE ORAL EVERY 6 HOURS SCHEDULED
Qty: 70 CAPSULE | Refills: 0 | Status: SHIPPED | OUTPATIENT
Start: 2023-02-02 | End: 2023-02-09 | Stop reason: HOSPADM

## 2023-02-02 RX ORDER — BENZONATATE 100 MG/1
100 CAPSULE ORAL 3 TIMES DAILY PRN
Qty: 12 CAPSULE | Refills: 0 | Status: ON HOLD | OUTPATIENT
Start: 2023-02-02 | End: 2023-02-09

## 2023-02-02 RX ORDER — CARVEDILOL 12.5 MG/1
12.5 TABLET ORAL 2 TIMES DAILY WITH MEALS
Qty: 60 TABLET | Refills: 0 | Status: SHIPPED | OUTPATIENT
Start: 2023-02-02 | End: 2023-03-31 | Stop reason: SDUPTHER

## 2023-02-02 RX ORDER — CEPHALEXIN 500 MG/1
1000 CAPSULE ORAL EVERY 6 HOURS SCHEDULED
Status: DISCONTINUED | OUTPATIENT
Start: 2023-02-02 | End: 2023-02-02 | Stop reason: HOSPADM

## 2023-02-02 RX ADMIN — DULOXETINE HYDROCHLORIDE 60 MG: 60 CAPSULE, DELAYED RELEASE ORAL at 09:01

## 2023-02-02 RX ADMIN — CEPHALEXIN 1000 MG: 500 CAPSULE ORAL at 12:31

## 2023-02-02 RX ADMIN — BUSPIRONE HYDROCHLORIDE 5 MG: 5 TABLET ORAL at 09:01

## 2023-02-02 RX ADMIN — FAMOTIDINE 20 MG: 20 TABLET, FILM COATED ORAL at 09:02

## 2023-02-02 RX ADMIN — Medication 1000 UNITS: at 09:01

## 2023-02-02 RX ADMIN — FAMOTIDINE 20 MG: 20 TABLET, FILM COATED ORAL at 16:59

## 2023-02-02 RX ADMIN — HYDRALAZINE HYDROCHLORIDE 10 MG: 20 INJECTION INTRAMUSCULAR; INTRAVENOUS at 02:13

## 2023-02-02 RX ADMIN — BUSPIRONE HYDROCHLORIDE 5 MG: 5 TABLET ORAL at 16:59

## 2023-02-02 RX ADMIN — ACETAMINOPHEN 650 MG: 325 TABLET, FILM COATED ORAL at 09:21

## 2023-02-02 RX ADMIN — MULTIPLE VITAMINS W/ MINERALS TAB 1 TABLET: TAB at 09:01

## 2023-02-02 RX ADMIN — CARVEDILOL 12.5 MG: 12.5 TABLET, FILM COATED ORAL at 09:01

## 2023-02-02 RX ADMIN — CEPHALEXIN 1000 MG: 500 CAPSULE ORAL at 17:02

## 2023-02-02 RX ADMIN — LISINOPRIL 40 MG: 40 TABLET ORAL at 09:01

## 2023-02-02 RX ADMIN — CARVEDILOL 12.5 MG: 12.5 TABLET, FILM COATED ORAL at 17:02

## 2023-02-02 RX ADMIN — REMDESIVIR 100 MG: 100 INJECTION, POWDER, LYOPHILIZED, FOR SOLUTION INTRAVENOUS at 16:59

## 2023-02-02 RX ADMIN — ASPIRIN 81 MG: 81 TABLET, COATED ORAL at 09:02

## 2023-02-02 RX ADMIN — ENOXAPARIN SODIUM 40 MG: 100 INJECTION SUBCUTANEOUS at 09:09

## 2023-02-02 NOTE — PROGRESS NOTES
LOS: 3 days     Chief Complaint: COVID-19, positive blood culture    Interval History: Still fatigued this morning.  Not yet ready for breakfast.  On room air.    Vital Signs  Temp:  [96.5 °F (35.8 °C)-98.6 °F (37 °C)] 98.3 °F (36.8 °C)  Heart Rate:  [60-82] 66  Resp:  [16] 16  BP: (126-178)/(64-98) 140/81    Physical Exam:  General: In no acute distress  HEENT: Oropharynx clear, moist mucous membranes  Cardiovascular: RRR  Respiratory: Normal work of breathing.  No wheezing.  GI: Soft, NT/ND  Skin: No rashes or lesions  Extremities: No edema, cyanosis  Access: Peripheral IV.    Antibiotics:  Anti-Infectives (From admission, onward)    Ordered     Dose/Rate Route Frequency Start Stop    02/01/23 2008  cefTRIAXone (ROCEPHIN) 2 g in sodium chloride 0.9 % 100 mL IVPB-VTB        Ordering Provider: Amirah Carvajal MD    2 g  200 mL/hr over 30 Minutes Intravenous Every 24 Hours 02/02/23 1730 02/07/23 1729    01/29/23 2149  remdesivir 100 mg in sodium chloride 0.9 % 250 mL IVPB (powder vial)        Ordering Provider: Gabriela Mcgraw MD   See Hyperspace for full Linked Orders Report.    100 mg  over 60 Minutes Intravenous Every 24 Hours 01/30/23 2234 02/03/23 2234    01/29/23 2149  remdesivir 200 mg in sodium chloride 0.9 % 290 mL IVPB (powder vial)        Ordering Provider: Gabriela Mcgraw MD   See Hyperspace for full Linked Orders Report.    200 mg  over 60 Minutes Intravenous Every 24 Hours 01/29/23 2245 01/30/23 0323    01/29/23 1644  cefTRIAXone (ROCEPHIN) 2 g in sodium chloride 0.9 % 100 mL IVPB-VTB        Ordering Provider: Clarissa Quinteros PA    2 g  200 mL/hr over 30 Minutes Intravenous Once 01/29/23 1646 01/29/23 1754           Results Review:     I reviewed the patient's new clinical results.    Lab Results   Component Value Date    WBC 5.12 02/02/2023    HGB 12.3 02/02/2023    HCT 37.8 02/02/2023    MCV 88.3 02/02/2023     02/02/2023     Lab Results   Component Value Date    GLUCOSE 102 (H) 02/02/2023     BUN 15 02/02/2023    CREATININE 0.61 02/02/2023    EGFRIFNONA 76 09/22/2021    EGFRIFAFRI 88 09/22/2021    BCR 24.6 02/02/2023    CO2 25.6 02/02/2023    CALCIUM 8.5 (L) 02/02/2023    PROTENTOTREF 6.7 01/19/2023    ALBUMIN 3.1 (L) 02/02/2023    LABIL2 1.6 01/19/2023    AST 20 02/02/2023    ALT 13 02/02/2023       Microbiology:  1/29 respiratory panel positive for COVID-19  1/29 urine culture no growth to date  1/29 blood cultures 1 out of 2 Streptococcus alactolyticus  1/30 blood cultures no growth to date      Assessment    #Strep septicemia  #COVID-19 infection  #Acute hypoxic respiratory failure    Repeat blood cultures negative to date.  Discussed with ID pharmacy and micro today in regards to added on testing for other oral agents for susceptibility.  Cefazolin defusion range is within the CLSI ranges for other cephalosporins and through extrapolation would likely be sufficient harboring response.    Plan to transition to Keflex 1 g 3 times daily to complete out a 14-day course.    For COVID-19, continue remdesivir 100 mg daily for 5 days.  Continue supportive care.     Thank you for allowing me to be involved in the care of this patient. Infectious diseases will sign off at this time with antibiotics plan in place, but please call me at [phone] if any further ID questions or new ID concerns.

## 2023-02-02 NOTE — PROGRESS NOTES
Continued Stay Note  [facility]     Patient Name: Gita Avelar  MRN: [id number]  Today's Date: 2/2/2023    Admit Date: 1/29/2023    Plan: Home with [facility] (Referral in [facility]/Pending)   Discharge Plan     Row Name 02/02/23 1603       Plan    Plan Home with [facility] (Referral in [facility]/Pending)    Plan Comments Discharge order noted. Order noted for Home Health. Referral placed in [facility] for [facility]. Informed Danya with [facility] of referral and DC today.               Discharge Codes    No documentation.               Expected Discharge Date and Time     Expected Discharge Date Expected Discharge Time    Feb 2, 2023             Rea Bingham RN

## 2023-02-02 NOTE — DISCHARGE PLACEMENT REQUEST
"Gita Avelar (78 y.o. Female)     Date of Birth   1945    Social Security Number       Address   [address] [place] [address]    Home Phone   [phone]    MRN   [id number]       Sabianism   None    Marital Status                               Admission Date   1/29/23    Admission Type   Emergency    Admitting Provider   Gabriela Mcgraw MD    Attending Provider   Gagan Olivares MD    Department, Room/Bed   [address], [facility]/       Discharge Date       Discharge Disposition   Home or Self Care    Discharge Destination                               Attending Provider: Gagan Olivares MD    Allergies: No Known Allergies    Isolation: Enh Drop/Con   Infection: COVID (confirmed) (01/29/23)   Code Status: CPR    Ht: 162.6 cm (64\")   Wt: 59 kg (130 lb)    Admission Cmt: None   Principal Problem: COVID-19 virus infection [U07.1]                 Active Insurance as of 1/29/2023     Primary Coverage     Payor Plan Insurance Group Employer/Plan Group    MEDICARE MEDICARE A & B      Payor Plan Address Payor Plan Phone Number Payor Plan Fax Number Effective Dates    [address] [phone]  1/1/2010 - None Entered    [facility] [id number]       Subscriber Name Subscriber Birth Date Member ID       GITA AVELAR 1945 [id number]           Secondary Coverage     Payor Plan Insurance Group Employer/Plan Group    [facility] [employer] OPTIONS PLAN K     Payor Plan Address Payor Plan Phone Number Payor Plan Fax Number Effective Dates    [facility] [phone]  1/1/2016 - None Entered    [address]       [facility] [address]       Subscriber Name Subscriber Birth Date Member ID       GITA AVELAR 1945 [id number]                 Emergency Contacts      (Rel.) Home Phone Work Phone Mobile Phone    Mahesh Avelar (Son) [phone] -- --    CHARITY AVELAR (Daughter) [phone] -- --              "

## 2023-02-02 NOTE — PROGRESS NOTES
[facility] will follow post hospital. Patient agreeable to service. Contact information confirmed. Patient denies any current home health care.

## 2023-02-02 NOTE — PROGRESS NOTES
Continued Stay Note  [facility]     Patient Name: Gita Avelar  MRN: [id number]  Today's Date: 2/2/2023    Admit Date: 1/29/2023    Plan: Home with [facility]   Discharge Plan     Row Name 02/02/23 1617       Plan    Plan Home with [facility]    Plan Comments Per Danya with [facility], they can accept.    Row Name 02/02/23 1603       Plan    Plan Home with [facility] (Referral in [facility]/Pending)    Plan Comments Discharge order noted. Order noted for Home Health. Referral placed in [facility] for [facility]. Informed Danya with [facility] of referral and DC today.               Discharge Codes    No documentation.               Expected Discharge Date and Time     Expected Discharge Date Expected Discharge Time    Feb 2, 2023             Rea Bingham RN

## 2023-02-02 NOTE — THERAPY TREATMENT NOTE
Patient Name: Gita Avelar  : 1945    MRN: [id number]                              Today's Date: 2023       Admit Date: 2023    Visit Dx:     ICD-10-CM ICD-9-CM   1. Acute UTI  N39.0 599.0   2. COVID-19  U07.1 079.89   3. Generalized weakness  R53.1 780.79   4. Hypokalemia  E87.6 276.8     Patient Active Problem List   Diagnosis   • Avitaminosis D   • B12 deficiency   • Near syncope   • Atrophic vaginitis   • Awareness of heartbeats   • OP (osteoporosis)   • HLD (hyperlipidemia)   • Fatigue   • Essential hypertension   • Moderate episode of recurrent major depressive disorder (HCC)   • Nipple discharge   • Abnormal ultrasound of breast   • Abnormal mammogram   • Lump or mass in breast   • Family history of malignant neoplasm of pancreas   • Atherosclerosis of both carotid arteries   • Acute UTI   • COVID-19 virus infection   • Hypoxia   • Streptococcal septicemia (HCC)     Past Medical History:   Diagnosis Date   • Arthritis     HANDS   • B12 deficiency    • Bladder prolapse, female, acquired    • Depression    • Diverticulitis of colon    • History of Guillain-[place] syndrome    • History of subdural hematoma    • Hyperlipidemia    • Macular degeneration of left eye    • Osteoporosis    • Stress incontinence    • Vitamin D deficiency      Past Surgical History:   Procedure Laterality Date   • BREAST BIOPSY     • BREAST BIOPSY Left 2017    Procedure: left breast excisional biopsy using a lacrimal duct probe and the intraoperative ultrasound. ;  Surgeon: Mallory Lieberman MD;  [facility]: [facility];  Service:    • HYSTERECTOMY        General Information     Row Name 23 1145          Physical Therapy Time and Intention    Document Type therapy note (daily note)  -EB     Mode of Treatment physical therapy;individual therapy  -EB     Row Name 23 1145          General Information    Existing Precautions/Restrictions fall  -EB     Row Name 23 1145          Cognition     Orientation Status (Cognition) oriented x 4  -EB     Row Name 02/02/23 1145          Safety Issues, Functional Mobility    Impairments Affecting Function (Mobility) endurance/activity tolerance;strength  -EB           User Key  (r) = Recorded By, (t) = Taken By, (c) = Cosigned By    Initials Name Provider Type    Minda Quezada PTA Physical Therapist Assistant               Mobility     Row Name 02/02/23 1146          Bed Mobility    Supine-Sit [place] (Bed Mobility) supervision  -EB     Sit-Supine [place] (Bed Mobility) supervision  -EB     Assistive Device (Bed Mobility) bed rails;head of bed elevated  -EB     Row Name 02/02/23 1146          Sit-Stand Transfer    Sit-Stand [place] (Transfers) standby assist  -EB     Assistive Device (Sit-Stand Transfers) walker, front-wheeled  -EB     Row Name 02/02/23 1146          Gait/Stairs (Locomotion)    [place] Level (Gait) standby assist  -EB     Assistive Device (Gait) walker, front-wheeled  -EB     Distance in Feet (Gait) 40ft  -EB     Deviations/Abnormal Patterns (Gait) dexter decreased;stride length decreased;gait speed decreased;base of support, narrow  -EB     Bilateral Gait Deviations forward flexed posture  -EB     Comment, (Gait/Stairs) gait distance limited due to fatigue.  -EB           User Key  (r) = Recorded By, (t) = Taken By, (c) = Cosigned By    Initials Name Provider Type    Minda Quezada PTA Physical Therapist Assistant               Obj/Interventions     Row Name 02/02/23 1150          Motor Skills    Therapeutic Exercise --  BLE: QS, HS, glute sets (X10)  -EB     Row Name 02/02/23 1150          Balance    Balance Assessment sitting static balance  -EB     Static Sitting Balance independent  -EB     Position, Sitting Balance sitting edge of bed  -EB           User Key  (r) = Recorded By, (t) = Taken By, (c) = Cosigned By    Initials Name Provider Type    Minda Quezada PTA Physical Therapist Assistant                Goals/Plan    No documentation.                Clinical Impression     Row Name 02/02/23 1151          Plan of Care Review    Plan of Care Reviewed With patient  -EB     Progress improving  -EB     Outcome Evaluation Pt tolerated PT treatment with c/o fatigue. Pt needed supervision with bed mobility and SBA with transfers. Pt ambulated 40ft with rwx, SBA. Pt's gait distance limited due to fatigue. Pt wants to go home and reports she would have someone able to stay with her. Pt fairly steady with gait but does fatigue quickly. Pt will continue to benefit from PT services to improve strength and endurance. Will continue to progress pt as able.  -EB     Row Name 02/02/23 1151          Therapy Assessment/Plan (PT)    Therapy Frequency (PT) 3 times/wk  -EB     Row Name 02/02/23 1151          Positioning and Restraints    Pre-Treatment Position in bed  -EB     Post Treatment Position bed  -EB     In Bed supine;call light within reach;exit alarm on;encouraged to call for assist  -EB           User Key  (r) = Recorded By, (t) = Taken By, (c) = Cosigned By    Initials Name Provider Type    EB Minda Zamora PTA Physical Therapist Assistant               Outcome Measures     Row Name 02/02/23 1154 02/02/23 0859       How much help from another person do you currently need...    Turning from your back to your side while in flat bed without using bedrails? 4  -EB 4  -VL    Moving from lying on back to sitting on the side of a flat bed without bedrails? 4  -EB 4  -VL    Moving to and from a bed to a chair (including a wheelchair)? 3  -EB 4  -VL    Standing up from a chair using your arms (e.g., wheelchair, bedside chair)? 4  -EB 4  -VL    Climbing 3-5 steps with a railing? 3  -EB 3  -VL    To walk in hospital room? 3  -EB 3  -VL    AM-PAC 6 Clicks Score (PT) 21  -EB 22  -VL    Highest level of mobility 6 --> Walked 10 steps or more  -EB 7 --> Walked 25 feet or more  -VL          User Key  (r) = Recorded By, (t) = Taken By, (c) =  Cosigned By    Initials Name Provider Type    Tasia Valentin, RN Registered Nurse    Minda Quezada PTA Physical Therapist Assistant                             Physical Therapy Education     Title: PT OT SLP Therapies (Done)     Topic: Physical Therapy (Done)     Point: Mobility training (Done)     Learning Progress Summary           Patient Acceptance, E, VU by EB at 2/2/2023 1154    Acceptance, E,TB, VU by MB at 1/31/2023 1539                   Point: Home exercise program (Done)     Learning Progress Summary           Patient Acceptance, E, VU by EB at 2/2/2023 1154                   Point: Body mechanics (Done)     Learning Progress Summary           Patient Acceptance, E, VU by EB at 2/2/2023 1154                   Point: Precautions (Done)     Learning Progress Summary           Patient Acceptance, E, VU by MONCHO at 2/2/2023 1154                               User Key     Initials Effective Dates Name Provider Type Discipline     06/16/21 -  Minda Zamora PTA Physical Therapist Assistant PT    MB 12/30/22 -  Henok Erazo, YASEMIN Student PT Student PT              PT Recommendation and Plan     Plan of Care Reviewed With: patient  Progress: improving  Outcome Evaluation: Pt tolerated PT treatment with c/o fatigue. Pt needed supervision with bed mobility and SBA with transfers. Pt ambulated 40ft with rwx, SBA. Pt's gait distance limited due to fatigue. Pt wants to go home and reports she would have someone able to stay with her. Pt fairly steady with gait but does fatigue quickly. Pt will continue to benefit from PT services to improve strength and endurance. Will continue to progress pt as able.     Time Calculation:    PT Charges     Row Name 02/02/23 1145             Time Calculation    Start Time 0959  -EB      Stop Time 1024  -      Time Calculation (min) 25 min  -EB      PT Received On 02/02/23  -EB      PT - Next Appointment 02/03/23  -         Time Calculation- PT    Total Timed Code Minutes- PT  25 minute(s)  -EB            User Key  (r) = Recorded By, (t) = Taken By, (c) = Cosigned By    Initials Name Provider Type    Minda Quezada PTA Physical Therapist Assistant              Therapy Charges for Today     Code Description Service Date Service Provider Modifiers Qty    17555639916 HC GAIT TRAINING EA 15 MIN 2/2/2023 Minda Zamora PTA GP 1    09726143212  PT THER PROC EA 15 MIN 2/2/2023 Minda Zamora PTA GP 1          PT G-Codes  Outcome Measure Options: AM-PAC 6 Clicks Basic Mobility (PT)  AM-PAC 6 Clicks Score (PT): 21       Minda Zamora PTA  2/2/2023

## 2023-02-02 NOTE — PLAN OF CARE
Goal Outcome Evaluation:  Plan of Care Reviewed With: patient        Progress: improving  Outcome Evaluation: Pt tolerated PT treatment with c/o fatigue. Pt needed supervision with bed mobility and SBA with transfers. Pt ambulated 40ft with rwx, SBA. Pt's gait distance limited due to fatigue. Pt wants to go home and reports she would have someone able to stay with her. Pt fairly steady with gait but does fatigue quickly. Pt will continue to benefit from PT services to improve strength and endurance. Will continue to progress pt as able.    ..Patient was not wearing a face mask during this therapy encounter. Therapist used appropriate personal protective equipment including gown, eye protection, mask and gloves.  Mask used was N95/duckbill. Appropriate PPE was worn during the entire therapy session. Hand hygiene was completed before and after therapy session. Patient is in enhanced droplet precautions.

## 2023-02-02 NOTE — PLAN OF CARE
Goal Outcome Evaluation:              Outcome Evaluation: Afebrile. Room air. Alert and oriented, no c/o pain. BP ranges high, IV Hydralazine given PRN. Occasional productive cough, no signs of respiratory distress. Incontinent urine, pericare done, Z guard cream applied to redness at perirectal area. IV Fluid at KVO rate, due Remdesivir given. Slept on and off.

## 2023-02-02 NOTE — DISCHARGE SUMMARY
Patient Name: Gita Avelar  : 1945  MRN: [id number]    Date of Admission: 2023  Date of Discharge:  2023  Primary Care Physician: Ernie Wright APRN      Chief Complaint:   Weakness - Generalized and Cough      Discharge Diagnoses     Active Hospital Problems    Diagnosis  POA   • **COVID-19 virus infection [U07.1]  Yes   • Streptococcal septicemia (HCC) [A40.9]  Unknown   • Acute UTI [N39.0]  Yes   • Hypoxia [R09.02]  Unknown   • Essential hypertension [I10]  Yes      Resolved Hospital Problems   No resolved problems to display.        Hospital Course     Patient is a 78-year-old female with complicated past medical history including history of hypertension, depression, macular degeneration of the left eye, prior history of subdural hematoma and Guillain-Barré syndrome and stress incontinence and arthritis was in her usual state of her health until this past Friday when she started having progressive weakness associated nausea and vomiting and mild cough.  Patient's daughter-in-law was recently diagnosed with COVID-19 but patient is not sure if she is exposed to her.  Because of the progressive weakness and unexplained nausea and vomiting couple days ago patient was brought to the emergency room for further evaluation and was found to have hypokalemia along with abnormal urinalysis suggestive of UTI and positive COVID-19 assay.  Patient was initially noted to be not hypoxic and was able to maintain oxygen saturation on room air but as she progressed in the hospital she needed oxygen supplementation to keep oxygen saturation above 94%.    #1 COVID-19/hypoxia, was able to be weaned off oxygen and therefore it steroids were discontinued.  Improving clinically and today is her last day of remdesivir.  Infectious disease did evaluate as well.  Will be on cough suppressants upon discharge.  Home health and home physical therapy is being arranged as well.    2.  Strep septicemia, blood cultures  were positive on 129 and infectious disease evaluated initially was placed on IV Rocephin thereafter being switched to p.o. cephalosporin and will complete a course as an outpatient basis.  Repeat blood cultures did not reveal any growth.    3.  UTI, UA was impressive and cultures so far did not reveal any growth.  Continue with the cephalosporins and clearly improving.    4.  Generalized weakness, PT/OT consults have been obtained during his hospital stay and home health and home physical therapy is being arranged.    5.  Hypertension, continue with lisinopril and Coreg has been further advanced to 12.5 mg.    6.  Hyperlipidemia, on statins.    7.  Leukopenia, platelet count did drop to 2.77 and was felt to be secondary to COVID-19 as well as underlying infection.  WBC now back to normal.    Copied text on this note has been reviewed by me on 2/2/2023    Day of Discharge         Physical Exam:  Temp:  [96.5 °F (35.8 °C)-98.6 °F (37 °C)] 98.1 °F (36.7 °C)  Heart Rate:  [60-82] 70  Resp:  [16] 16  BP: (138-178)/(78-98) 138/86  Body mass index is 22.31 kg/m².  Physical Exam    General: Alert, laying in bed, not distressed,  HEENT: Normocephalic, atraumatic  CV: RRR, no murmurs  Lungs: CTA, no wheezing, on room air  abdomen: Soft, nontender, nondistended  Extremities: No significant peripheral edema , no cyanosis  Neurologic: Globally weak, no facial asymmetry    Consultants     Consult Orders (all) (From admission, onward)     Start     Ordered    01/30/23 0957  Inpatient Infectious Diseases Consult  Once        Specialty:  Infectious Diseases  Provider:  Darius Bonilla MD    01/30/23 0957    01/30/23 0953  Inpatient Case Management  Consult  Once        Provider:  (Not yet assigned)    01/30/23 0952    01/29/23 1735  [facility] (on-call MD unless specified) Details  Once        Specialty:  Hospitalist  Provider:  Gabriela Mcgraw MD    01/29/23 1737              Procedures     * Surgery not found  *      Imaging Results (All)     Procedure Component Value Units Date/Time    XR Chest 1 View [[id number]] Collected: 01/29/23 1601     Updated: 01/29/23 1605    Narrative:      XR CHEST 1 VW-     HISTORY: Female who is 78 years-old,  cough, short of breath     TECHNIQUE: Frontal views of the chest     COMPARISON: None available     FINDINGS: The heart is enlarged. Aorta is tortuous, calcified. Pulmonary  vasculature is unremarkable. No focal pulmonary consolidation, pleural  effusion, or pneumothorax. No acute osseous process.       Impression:      No focal pulmonary consolidation. Cardiomegaly. Tortuous  aorta. Follow-up as clinically indicated.     This report was finalized on 1/29/2023 4:02 PM by Dr. Olaf Cortez M.D.             Results for orders placed in visit on 01/08/15    SCANNED - ECHOCARDIOGRAM    Pertinent Labs     Results from last 7 days   Lab Units 02/02/23  0551 02/01/23  0602 01/31/23  0625 01/30/23  0540   WBC 10*3/mm3 5.12 4.31 2.77* 4.23   HEMOGLOBIN g/dL 12.3 12.8 11.9* 12.7   PLATELETS 10*3/mm3 223 204 158 160     Results from last 7 days   Lab Units 02/02/23  0551 02/01/23  0602 01/31/23  0625 01/30/23  0540   SODIUM mmol/L 139 140 137 139   POTASSIUM mmol/L 3.3* 3.7 4.3 3.2*   CHLORIDE mmol/L 104 106 108* 103   CO2 mmol/L 25.6 26.8 22.5 25.7   BUN mg/dL 15 14 17 15   CREATININE mg/dL 0.61 0.64 0.59 0.76   GLUCOSE mg/dL 102* 112* 96 85   EGFR mL/min/1.73 91.6 90.6 92.4 80.3     Results from last 7 days   Lab Units 02/02/23  0551 02/01/23  0602 01/31/23  0625 01/30/23  0540   ALBUMIN g/dL 3.1* 3.5 3.3* 3.2*   BILIRUBIN mg/dL 0.2 0.3 0.2 0.3   ALK PHOS U/L 62 66 59 66   AST (SGOT) U/L 20 21 17 18   ALT (SGPT) U/L 13 12 10 11     Results from last 7 days   Lab Units 02/02/23  0551 02/01/23  0602 01/31/23  0625 01/30/23  0540 01/29/23  1507   CALCIUM mg/dL 8.5* 8.6 8.3* 8.2* 9.2   ALBUMIN g/dL 3.1* 3.5 3.3* 3.2* 4.0   MAGNESIUM mg/dL  --   --   --   --  2.0       Results from last 7 days    Lab Units 01/29/23  1507   TROPONIN T ng/mL <0.010   PROBNP pg/mL 311.0           Invalid input(s): LDLCALC  Results from last 7 days   Lab Units 01/30/23  1523 01/29/23  1723 01/29/23  1624   BLOODCX  No growth at 2 days  No growth at 2 days Streptococcus alactolyticus*  No growth at 3 days  --    URINECX   --   --  No growth   BCIDPCR   --  Streptococcus spp, not A, B, or pneumoniae. Identification by BCID2 PCR.*  --      Results from last 7 days   Lab Units 01/29/23  1614   COVID19  Detected*       Test Results Pending at Discharge     Pending Labs     Order Current Status    Blood Culture - Blood, Arm, Left Preliminary result    Blood Culture - Blood, Arm, Left Preliminary result    Blood Culture - Blood, Arm, Left Preliminary result    Blood Culture - Blood, Arm, Right Preliminary result          Discharge Details        Discharge Medications      New Medications      Instructions Start Date   benzonatate 100 MG capsule  Commonly known as: TESSALON   100 mg, Oral, 3 Times Daily PRN      cephalexin 500 MG capsule  Commonly known as: KEFLEX   1,000 mg, Oral, Every 6 Hours Scheduled         Changes to Medications      Instructions Start Date   carvedilol 12.5 MG tablet  Commonly known as: COREG  What changed:   · medication strength  · how much to take   12.5 mg, Oral, 2 Times Daily With Meals         Continue These Medications      Instructions Start Date   aspirin 81 MG tablet   81 mg, Oral, Daily      bisacodyl 5 MG EC tablet  Commonly known as: DULCOLAX   5 mg, Oral, Daily PRN      busPIRone 5 MG tablet  Commonly known as: BUSPAR   5 mg, Oral, 3 Times Daily      cholecalciferol 25 MCG (1000 UT) tablet  Commonly known as: VITAMIN D3   1,000 Units, Oral, Daily      DULoxetine 60 MG capsule  Commonly known as: CYMBALTA   60 mg, Oral, Daily, Needs FU      lisinopril 40 MG tablet  Commonly known as: PRINIVIL,ZESTRIL   40 mg, Oral, Daily      meclizine 25 MG tablet  Commonly known as: ANTIVERT   25 mg, Oral, 3  Times Daily PRN      multivitamin with minerals tablet tablet   Oral      rosuvastatin 20 MG tablet  Commonly known as: Crestor   20 mg, Oral, Nightly             No Known Allergies    Discharge Disposition:  Home or Self Care      Discharge Diet:  Diet Order   Procedures   • Diet: Cardiac Diets; Healthy Heart (2-3 Na+); Texture: Regular Texture (IDDSI 7); Fluid Consistency: Thin (IDDSI 0)       Discharge Activity:   Activity Instructions     Activity as Tolerated            CODE STATUS:    Code Status and Medical Interventions:   Ordered at: 01/29/23 2150     Code Status (Patient has no pulse and is not breathing):    CPR (Attempt to Resuscitate)     Medical Interventions (Patient has pulse or is breathing):    Full Support       No future appointments.  Additional Instructions for the Follow-ups that You Need to Schedule     Ambulatory Referral to Home Health (Hospital)   As directed      Face to Face Visit Date: 2/2/2023    Follow-up provider for Plan of Care?: I treated the patient in an acute care facility and will not continue treatment after discharge.    Follow-up provider: KHUSHBU MCCLURE [[id number]]    Reason/Clinical Findings: weakness/pneumonia/covid 19    Describe mobility limitations that make leaving home difficult: weakness/pneumonia/covid 19    Nursing/Therapeutic Services Requested: Skilled Nursing Physical Therapy    Skilled nursing orders: Cardiopulmonary assessments    PT orders: Therapeutic exercise Gait Training Transfer training Strengthening    Weight Bearing Status: As Tolerated    Frequency: 1 Week 1         Discharge Follow-up with PCP   As directed       Currently Documented PCP:    Khushbu Mcclure APRN    PCP Phone Number:    [phone]     Follow Up Details: 2 weeks            Follow-up Information     Khushbu Mcclure APRN .    Specialties: Family Medicine, Nurse Practitioner  Why: 2 weeks  Contact information:  [address] [id number] [phone]                          Additional Instructions for the Follow-ups that You Need to Schedule     Ambulatory Referral to Home Health (Hospital)   As directed      Face to Face Visit Date: 2/2/2023    Follow-up provider for Plan of Care?: I treated the patient in an acute care facility and will not continue treatment after discharge.    Follow-up provider: KHUSHBU MCCLURE [[id number]]    Reason/Clinical Findings: weakness/pneumonia/covid 19    Describe mobility limitations that make leaving home difficult: weakness/pneumonia/covid 19    Nursing/Therapeutic Services Requested: Skilled Nursing Physical Therapy    Skilled nursing orders: Cardiopulmonary assessments    PT orders: Therapeutic exercise Gait Training Transfer training Strengthening    Weight Bearing Status: As Tolerated    Frequency: 1 Week 1         Discharge Follow-up with PCP   As directed       Currently Documented PCP:    Khushbu Mcclure APRN    PCP Phone Number:    [phone]     Follow Up Details: 2 weeks           Time Spent on Discharge:  Greater than 30 minutes      Gagan Olivares MD  [facility]  02/02/23  15:56 EST

## 2023-02-03 ENCOUNTER — TELEPHONE (OUTPATIENT)
Dept: FAMILY MEDICINE CLINIC | Facility: CLINIC | Age: 78
End: 2023-02-03

## 2023-02-03 ENCOUNTER — TRANSITIONAL CARE MANAGEMENT TELEPHONE ENCOUNTER (OUTPATIENT)
Dept: CALL CENTER | Facility: HOSPITAL | Age: 78
End: 2023-02-03
Payer: MEDICARE

## 2023-02-03 ENCOUNTER — HOME CARE VISIT (OUTPATIENT)
Dept: HOME HEALTH SERVICES | Facility: HOME HEALTHCARE | Age: 78
End: 2023-02-03
Payer: MEDICARE

## 2023-02-03 LAB
BACTERIA SPEC AEROBE CULT: ABNORMAL
BACTERIA SPEC AEROBE CULT: ABNORMAL
BACTERIA SPEC AEROBE CULT: NORMAL
GRAM STN SPEC: ABNORMAL
ISOLATED FROM: ABNORMAL
ISOLATED FROM: ABNORMAL

## 2023-02-03 PROCEDURE — G0299 HHS/HOSPICE OF RN EA 15 MIN: HCPCS

## 2023-02-03 NOTE — OUTREACH NOTE
Call Center TCM Note    Flowsheet Row Responses   [facility] patient discharged from? [place]   Does the patient have one of the following disease processes/diagnoses(primary or secondary)? Other   TCM attempt successful? Yes   Call start time 1541   Discharge diagnosis COVID-19 virus infection   Meds reviewed with patient/caregiver? Yes   Does the patient have all medications ordered at discharge? No   Nursing Interventions No intervention needed   Prescription comments PATIENT STATES SHE WILL HAVE SOMEONE  HER PRESCRIPTIONS TODAY   Does the patient have an appointment with their PCP within 7 days of discharge? No   Nursing Interventions Patient declined scheduling/rescheduling appointment at this time   What is the Home health agency?  [facility]    Has home health visited the patient within 72 hours of discharge? Call prior to 72 hours   Psychosocial issues? No   Did the patient receive a copy of their discharge instructions? Yes   Nursing interventions Reviewed instructions with patient   What is the patient's perception of their health status since discharge? Improving   Is the patient/caregiver able to teach back signs and symptoms related to disease process for when to call PCP? Yes   Is the patient/caregiver able to teach back signs and symptoms related to disease process for when to call 911? Yes   Is the patient/caregiver able to teach back the hierarchy of who to call/visit for symptoms/problems? PCP, Specialist, Home health nurse, Urgent Care, ED, 911 Yes   If the patient is a current smoker, are they able to teach back resources for cessation? Not a smoker   TCM call completed? Yes   Would this patient benefit from a Referral to Amb Social Work? No   Is the patient interested in additional calls from an ambulatory ?  NOTE:  applies to high risk patients requiring additional follow-up. No          Judith Hawkins LPN    2/3/2023, 15:44 EST

## 2023-02-03 NOTE — OUTREACH NOTE
Call Center TCM Note    Flowsheet Row Responses   [facility] patient discharged from? [place]   Does the patient have one of the following disease processes/diagnoses(primary or secondary)? Other   TCM attempt successful? No   Unsuccessful attempts Attempt 1          Judith Hawkins LPN    2/3/2023, 09:42 EST

## 2023-02-03 NOTE — PROGRESS NOTES
Please reach out to her today or Monday. She was supposed to follow up on anxiety/depression. Make sure she is ok

## 2023-02-03 NOTE — TELEPHONE ENCOUNTER
Caller: MAHSA    Relationship to patient: Other    Best call back number:    Patient is needing: PLEASE CALL MAHSA WITH [facility] TO ADVISE THAT KHUSHBU MCCLURE WILL SIGN OFF ON THE PATIENT'S PLAN OF CARE.  ALSO, MAHSA WOULD LIKE A VERBAL ORDER FOR OCCUPATIONAL THERAPY.   PLEASE ADVISE ON BOTH.

## 2023-02-03 NOTE — OUTREACH NOTE
Prep Survey    Flowsheet Row Responses   [facility] patient discharged from? [place]   Is LACE score < 7 ? No   Eligibility [facility]   Date of Admission 01/29/23   Date of Discharge 02/02/23   Discharge Disposition Home-Health Care Svc   Discharge diagnosis COVID-19 virus infection   Does the patient have one of the following disease processes/diagnoses(primary or secondary)? Other   Does the patient have Home health ordered? Yes   What is the Home health agency?  [facility]    Is there a DME ordered? No   Prep survey completed? Yes          JAVIER QUINTERO - Registered Nurse

## 2023-02-03 NOTE — PROGRESS NOTES
Case Management Discharge Note      Final Note: Discharged home with [facility]. Danya/[facility] aware.. Rea Bingham RN             Home Medical Care Coordination complete.    Service Provider Selected Services Address Phone Fax Patient Preferred    [facility] [address] [address] [phone] --              Transportation Services  Private: Car    Final Discharge Disposition Code: 06 - home with home health care

## 2023-02-04 VITALS
TEMPERATURE: 97.3 F | BODY MASS INDEX: 22.2 KG/M2 | OXYGEN SATURATION: 96 % | HEIGHT: 64 IN | DIASTOLIC BLOOD PRESSURE: 92 MMHG | HEART RATE: 72 BPM | RESPIRATION RATE: 18 BRPM | SYSTOLIC BLOOD PRESSURE: 148 MMHG | WEIGHT: 130 LBS

## 2023-02-04 LAB
BACTERIA SPEC AEROBE CULT: NORMAL
BACTERIA SPEC AEROBE CULT: NORMAL

## 2023-02-04 NOTE — HOME HEALTH
SOC Note: Patient resting on couch when nurse arrived. Too weak to answer door. Patient is A&Ox4 but does have some memory deficits. Patient is hard of hearing and vision is poor. She no longer drives. Lungs are clear. Coughing up clear mucus. BP elevated at 148/92. Patient did not increase coreg as per dc instructions. advised on how to take coreg until she picks up new medications from pharmacy. Patient reporting appetite as being poor. States that the energy spent going to the kitchen and trying to fix food causes her extreme weakness were she does not want to eat afterwards. Teaching on already prepared foods, snacks, ensures/boosts. Advised to bring chair into kitchen so she can take breaks. Offered to bring chair in kitchen but patient declined stating her daughter would help her. Offered to get her some food or drink while nurse was there but patient declined again stating daughter was coming by soon. Advised to talk to children and ask for help. Patient reporting to nurse that she does not like to ask for help from family or friends.    New medication of Tessalon, Keflex and increase in Coreg  Patient has not picked up new medications from pharmacy. Advised to ask daughter. Teaching provided on possible outcome if she does not continue abt.    Home Health ordered for: disciplines SN PT, New order obtained for OT    Reason for Hosp/Primary Dx/Co-morbidities: Recent hospitalization at [facility] from 1/29-2/2 for COVID and UTI and HTN    Focus of Care: teaching and assessment for COVID    Plan for next visit: Cardiopulmonary assessment, Teaching regarding COVID, UTI and HTN, Assess BP (increased coreg), follow up that patient picked up medication from pharmacy. Educate on medication. goal based teaching. Follow up that patient has made F/U appt with PCP

## 2023-02-05 ENCOUNTER — READMISSION MANAGEMENT (OUTPATIENT)
Dept: CALL CENTER | Facility: HOSPITAL | Age: 78
End: 2023-02-05
Payer: MEDICARE

## 2023-02-05 ENCOUNTER — APPOINTMENT (OUTPATIENT)
Dept: GENERAL RADIOLOGY | Facility: HOSPITAL | Age: 78
DRG: 91 | End: 2023-02-05
Payer: MEDICARE

## 2023-02-05 ENCOUNTER — HOME CARE VISIT (OUTPATIENT)
Dept: HOME HEALTH SERVICES | Facility: HOME HEALTHCARE | Age: 78
End: 2023-02-05
Payer: MEDICARE

## 2023-02-05 ENCOUNTER — HOSPITAL ENCOUNTER (INPATIENT)
Facility: HOSPITAL | Age: 78
LOS: 1 days | Discharge: NURSING, REHAB OR LONG-TERM CARE | DRG: 91 | End: 2023-02-09
Attending: EMERGENCY MEDICINE | Admitting: HOSPITALIST
Payer: MEDICARE

## 2023-02-05 DIAGNOSIS — U07.1 COVID-19 VIRUS INFECTION: ICD-10-CM

## 2023-02-05 DIAGNOSIS — R53.1 GENERALIZED WEAKNESS: Primary | ICD-10-CM

## 2023-02-05 DIAGNOSIS — E86.0 DEHYDRATION: ICD-10-CM
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
## 2023-02-05 LAB
ALBUMIN SERPL-MCNC: 3.4 G/DL (ref 3.5–5.2)
ALBUMIN/GLOB SERPL: 1.2 G/DL
ALP SERPL-CCNC: 76 U/L (ref 39–117)
ALT SERPL W P-5'-P-CCNC: 12 U/L (ref 1–33)
ANION GAP SERPL CALCULATED.3IONS-SCNC: 5.3 MMOL/L (ref 5–15)
AST SERPL-CCNC: 18 U/L (ref 1–32)
BASOPHILS # BLD AUTO: 0.02 10*3/MM3 (ref 0–0.2)
BASOPHILS NFR BLD AUTO: 0.2 % (ref 0–1.5)
BILIRUB SERPL-MCNC: 0.6 MG/DL (ref 0–1.2)
BILIRUB UR QL STRIP: NEGATIVE
BUN SERPL-MCNC: 25 MG/DL (ref 8–23)
BUN/CREAT SERPL: 30.1 (ref 7–25)
CALCIUM SPEC-SCNC: 9.3 MG/DL (ref 8.6–10.5)
CHLORIDE SERPL-SCNC: 103 MMOL/L (ref 98–107)
CK SERPL-CCNC: 30 U/L (ref 20–180)
CLARITY UR: CLEAR
CO2 SERPL-SCNC: 28.7 MMOL/L (ref 22–29)
COLOR UR: YELLOW
CREAT SERPL-MCNC: 0.83 MG/DL (ref 0.57–1)
D-LACTATE SERPL-SCNC: 1.4 MMOL/L (ref 0.5–2)
DEPRECATED RDW RBC AUTO: 42.2 FL (ref 37–54)
EGFRCR SERPLBLD CKD-EPI 2021: 72.3 ML/MIN/1.73
EOSINOPHIL # BLD AUTO: 0.09 10*3/MM3 (ref 0–0.4)
EOSINOPHIL NFR BLD AUTO: 1 % (ref 0.3–6.2)
ERYTHROCYTE [DISTWIDTH] IN BLOOD BY AUTOMATED COUNT: 12.9 % (ref 12.3–15.4)
GLOBULIN UR ELPH-MCNC: 2.8 GM/DL
GLUCOSE SERPL-MCNC: 106 MG/DL (ref 65–99)
GLUCOSE UR STRIP-MCNC: NEGATIVE MG/DL
HCT VFR BLD AUTO: 42.9 % (ref 34–46.6)
HGB BLD-MCNC: 14 G/DL (ref 12–15.9)
HGB UR QL STRIP.AUTO: NEGATIVE
IMM GRANULOCYTES # BLD AUTO: 0.05 10*3/MM3 (ref 0–0.05)
IMM GRANULOCYTES NFR BLD AUTO: 0.5 % (ref 0–0.5)
KETONES UR QL STRIP: NEGATIVE
LEUKOCYTE ESTERASE UR QL STRIP.AUTO: NEGATIVE
LYMPHOCYTES # BLD AUTO: 2.02 10*3/MM3 (ref 0.7–3.1)
LYMPHOCYTES NFR BLD AUTO: 22.1 % (ref 19.6–45.3)
MCH RBC QN AUTO: 29.4 PG (ref 26.6–33)
MCHC RBC AUTO-ENTMCNC: 32.6 G/DL (ref 31.5–35.7)
MCV RBC AUTO: 89.9 FL (ref 79–97)
MONOCYTES # BLD AUTO: 0.87 10*3/MM3 (ref 0.1–0.9)
MONOCYTES NFR BLD AUTO: 9.5 % (ref 5–12)
NEUTROPHILS NFR BLD AUTO: 6.1 10*3/MM3 (ref 1.7–7)
NEUTROPHILS NFR BLD AUTO: 66.7 % (ref 42.7–76)
NITRITE UR QL STRIP: NEGATIVE
NRBC BLD AUTO-RTO: 0 /100 WBC (ref 0–0.2)
PH UR STRIP.AUTO: 6.5 [PH] (ref 5–8)
PLATELET # BLD AUTO: 255 10*3/MM3 (ref 140–450)
PMV BLD AUTO: 10 FL (ref 6–12)
POTASSIUM SERPL-SCNC: 3.7 MMOL/L (ref 3.5–5.2)
PROT SERPL-MCNC: 6.2 G/DL (ref 6–8.5)
PROT UR QL STRIP: NEGATIVE
QT INTERVAL: 426 MS
RBC # BLD AUTO: 4.77 10*6/MM3 (ref 3.77–5.28)
SARS-COV-2 RNA PNL SPEC NAA+PROBE: DETECTED
SODIUM SERPL-SCNC: 137 MMOL/L (ref 136–145)
SP GR UR STRIP: 1.02 (ref 1–1.03)
TROPONIN T SERPL-MCNC: <0.01 NG/ML (ref 0–0.03)
TROPONIN T SERPL-MCNC: <0.01 NG/ML (ref 0–0.03)
UROBILINOGEN UR QL STRIP: NORMAL
WBC NRBC COR # BLD: 9.15 10*3/MM3 (ref 3.4–10.8)

## 2023-02-05 PROCEDURE — 25010000002 CEFTRIAXONE PER 250 MG: Performed by: INTERNAL MEDICINE

## 2023-02-05 PROCEDURE — 80053 COMPREHEN METABOLIC PANEL: CPT | Performed by: EMERGENCY MEDICINE

## 2023-02-05 PROCEDURE — 93010 ELECTROCARDIOGRAM REPORT: CPT | Performed by: INTERNAL MEDICINE

## 2023-02-05 PROCEDURE — 71045 X-RAY EXAM CHEST 1 VIEW: CPT

## 2023-02-05 PROCEDURE — 93005 ELECTROCARDIOGRAM TRACING: CPT | Performed by: EMERGENCY MEDICINE

## 2023-02-05 PROCEDURE — G0378 HOSPITAL OBSERVATION PER HR: HCPCS

## 2023-02-05 PROCEDURE — 25010000002 SODIUM CHLORIDE 0.9 % WITH KCL 20 MEQ 20-0.9 MEQ/L-% SOLUTION: Performed by: INTERNAL MEDICINE

## 2023-02-05 PROCEDURE — 83605 ASSAY OF LACTIC ACID: CPT | Performed by: INTERNAL MEDICINE

## 2023-02-05 PROCEDURE — 84484 ASSAY OF TROPONIN QUANT: CPT | Performed by: INTERNAL MEDICINE

## 2023-02-05 PROCEDURE — 82550 ASSAY OF CK (CPK): CPT | Performed by: INTERNAL MEDICINE

## 2023-02-05 PROCEDURE — 87635 SARS-COV-2 COVID-19 AMP PRB: CPT | Performed by: EMERGENCY MEDICINE

## 2023-02-05 PROCEDURE — 85025 COMPLETE CBC W/AUTO DIFF WBC: CPT | Performed by: EMERGENCY MEDICINE

## 2023-02-05 PROCEDURE — 84484 ASSAY OF TROPONIN QUANT: CPT | Performed by: EMERGENCY MEDICINE

## 2023-02-05 PROCEDURE — 81003 URINALYSIS AUTO W/O SCOPE: CPT | Performed by: EMERGENCY MEDICINE

## 2023-02-05 PROCEDURE — 99284 EMERGENCY DEPT VISIT MOD MDM: CPT

## 2023-02-05 PROCEDURE — 87040 BLOOD CULTURE FOR BACTERIA: CPT | Performed by: INTERNAL MEDICINE

## 2023-02-05 RX ORDER — ROSUVASTATIN CALCIUM 20 MG/1
20 TABLET, COATED ORAL NIGHTLY
Status: DISCONTINUED | OUTPATIENT
Start: 2023-02-05 | End: 2023-02-07

## 2023-02-05 RX ORDER — DULOXETIN HYDROCHLORIDE 60 MG/1
60 CAPSULE, DELAYED RELEASE ORAL DAILY
Status: DISCONTINUED | OUTPATIENT
Start: 2023-02-06 | End: 2023-02-09 | Stop reason: HOSPADM

## 2023-02-05 RX ORDER — MELATONIN
1000 DAILY
Status: DISCONTINUED | OUTPATIENT
Start: 2023-02-06 | End: 2023-02-09 | Stop reason: HOSPADM

## 2023-02-05 RX ORDER — ACETAMINOPHEN 650 MG/1
650 SUPPOSITORY RECTAL EVERY 4 HOURS PRN
Status: DISCONTINUED | OUTPATIENT
Start: 2023-02-05 | End: 2023-02-09 | Stop reason: HOSPADM

## 2023-02-05 RX ORDER — ASPIRIN 81 MG/1
81 TABLET ORAL DAILY
Status: DISCONTINUED | OUTPATIENT
Start: 2023-02-06 | End: 2023-02-09 | Stop reason: HOSPADM

## 2023-02-05 RX ORDER — ACETAMINOPHEN 160 MG/5ML
650 SOLUTION ORAL EVERY 4 HOURS PRN
Status: DISCONTINUED | OUTPATIENT
Start: 2023-02-05 | End: 2023-02-09 | Stop reason: HOSPADM

## 2023-02-05 RX ORDER — LISINOPRIL 40 MG/1
40 TABLET ORAL DAILY
Status: DISCONTINUED | OUTPATIENT
Start: 2023-02-06 | End: 2023-02-06

## 2023-02-05 RX ORDER — BUSPIRONE HYDROCHLORIDE 5 MG/1
5 TABLET ORAL 3 TIMES DAILY
Status: DISCONTINUED | OUTPATIENT
Start: 2023-02-05 | End: 2023-02-09 | Stop reason: HOSPADM

## 2023-02-05 RX ORDER — SODIUM CHLORIDE AND POTASSIUM CHLORIDE 150; 900 MG/100ML; MG/100ML
100 INJECTION, SOLUTION INTRAVENOUS CONTINUOUS
Status: DISCONTINUED | OUTPATIENT
Start: 2023-02-05 | End: 2023-02-06

## 2023-02-05 RX ORDER — ACETAMINOPHEN 325 MG/1
650 TABLET ORAL EVERY 4 HOURS PRN
Status: DISCONTINUED | OUTPATIENT
Start: 2023-02-05 | End: 2023-02-09 | Stop reason: HOSPADM

## 2023-02-05 RX ORDER — ONDANSETRON 2 MG/ML
4 INJECTION INTRAMUSCULAR; INTRAVENOUS EVERY 6 HOURS PRN
Status: DISCONTINUED | OUTPATIENT
Start: 2023-02-05 | End: 2023-02-09 | Stop reason: HOSPADM

## 2023-02-05 RX ORDER — BENZONATATE 100 MG/1
100 CAPSULE ORAL 3 TIMES DAILY PRN
Status: DISCONTINUED | OUTPATIENT
Start: 2023-02-05 | End: 2023-02-06

## 2023-02-05 RX ORDER — CARVEDILOL 12.5 MG/1
12.5 TABLET ORAL 2 TIMES DAILY WITH MEALS
Status: DISCONTINUED | OUTPATIENT
Start: 2023-02-05 | End: 2023-02-09 | Stop reason: HOSPADM

## 2023-02-05 RX ORDER — MULTIPLE VITAMINS W/ MINERALS TAB 9MG-400MCG
1 TAB ORAL DAILY
Status: DISCONTINUED | OUTPATIENT
Start: 2023-02-06 | End: 2023-02-09 | Stop reason: HOSPADM

## 2023-02-05 RX ADMIN — ROSUVASTATIN CALCIUM 20 MG: 20 TABLET, FILM COATED ORAL at 23:28

## 2023-02-05 RX ADMIN — ACETAMINOPHEN 650 MG: 325 TABLET ORAL at 21:21

## 2023-02-05 RX ADMIN — SODIUM CHLORIDE, POTASSIUM CHLORIDE, SODIUM LACTATE AND CALCIUM CHLORIDE 1000 ML: 600; 310; 30; 20 INJECTION, SOLUTION INTRAVENOUS at 17:58

## 2023-02-05 RX ADMIN — CEFTRIAXONE SODIUM 1 G: 1 INJECTION, POWDER, FOR SOLUTION INTRAMUSCULAR; INTRAVENOUS at 23:20

## 2023-02-05 RX ADMIN — BUSPIRONE HYDROCHLORIDE 5 MG: 5 TABLET ORAL at 23:28

## 2023-02-05 RX ADMIN — CARVEDILOL 12.5 MG: 12.5 TABLET, FILM COATED ORAL at 23:28

## 2023-02-05 RX ADMIN — POTASSIUM CHLORIDE AND SODIUM CHLORIDE 100 ML/HR: 900; 150 INJECTION, SOLUTION INTRAVENOUS at 23:20

## 2023-02-05 NOTE — ED NOTES
Nursing report ED to floor  Gita Avelar  78 y.o.  female    HPI :   Chief Complaint   Patient presents with    Dizziness    Weakness - Generalized       Admitting doctor:   Za Jacobson MD    Admitting diagnosis:   The primary encounter diagnosis was Generalized weakness. Diagnoses of Dehydration, Fall, initial encounter, and COVID-19 virus infection were also pertinent to this visit.    Code status:   Current Code Status       Date Active Code Status Order ID Comments User Context       Prior            Allergies:   Patient has no known allergies.    Isolation:   No active isolations    Intake and Output  No intake or output data in the 24 hours ending 02/05/23 1851    Weight:   There were no vitals filed for this visit.    Most recent vitals:   Vitals:    02/05/23 1635   BP: 114/78   Pulse: 80   Resp: 16   Temp: 98.1 °F (36.7 °C)   TempSrc: Tympanic   SpO2: 95%       Active LDAs/IV Access:   Lines, Drains & Airways       Active LDAs       Name Placement date Placement time Site Days    Peripheral IV 02/05/23 1758 Right Antecubital 02/05/23  1758  Antecubital  less than 1                    Labs (abnormal labs have a star):   Labs Reviewed   COMPREHENSIVE METABOLIC PANEL - Abnormal; Notable for the following components:       Result Value    Glucose 106 (*)     BUN 25 (*)     Albumin 3.4 (*)     BUN/Creatinine Ratio 30.1 (*)     All other components within normal limits    Narrative:     GFR Normal >60  Chronic Kidney Disease <60  Kidney Failure <15    The GFR formula is only valid for adults with stable renal function between ages 18 and 70.   URINALYSIS W/ MICROSCOPIC IF INDICATED (NO CULTURE) - Normal    Narrative:     Urine microscopic not indicated.   TROPONIN (IN-HOUSE) - Normal    Narrative:     Troponin T Reference Range:  <= 0.03 ng/mL-   Negative for AMI  >0.03 ng/mL-     Abnormal for myocardial necrosis.  Clinicians would have to utilize clinical acumen, EKG, Troponin and serial changes to  determine if it is an Acute Myocardial Infarction or myocardial injury due to an underlying chronic condition.       Results may be falsely decreased if patient taking Biotin.     CBC WITH AUTO DIFFERENTIAL - Normal   COVID PRE-OP / PRE-PROCEDURE SCREENING ORDER (NO ISOLATION)    Narrative:     The following orders were created for panel order COVID PRE-OP / PRE-PROCEDURE SCREENING ORDER (NO ISOLATION) - Swab, Nasopharynx.  Procedure                               Abnormality         Status                     ---------                               -----------         ------                     COVID-19, NATALY IN-HOUSE...[[id number]]                      In process                   Please view results for these tests on the individual orders.   COVID-19, NATALY IN-HOUSE CEPHEID/GENEVA, NP SWAB IN TRANSPORT MEDIA 8-12 HR TAT   CBC AND DIFFERENTIAL    Narrative:     The following orders were created for panel order CBC & Differential.  Procedure                               Abnormality         Status                     ---------                               -----------         ------                     CBC Auto Differential[[id number]]        Normal              Final result                 Please view results for these tests on the individual orders.       EKG:   ECG 12 Lead Syncope   Preliminary Result   HEART RATE= 66  bpm   RR Interval= 909  ms   [place] Interval= 162  ms   P Horizontal Axis= -12  deg   P Front Axis= -9  deg   QRSD Interval= 113  ms   QT Interval= 426  ms   QRS Axis= -63  deg   T Wave Axis= 59  deg   - ABNORMAL ECG -   Sinus rhythm   Incomplete RBBB and LAFB   Electronically Signed By:    Date and Time of Study: 2023-02-05 17:38:19          Meds given in ED:   Medications   lactated ringers bolus 1,000 mL (1,000 mL Intravenous New Bag 2/5/23 4772)       Imaging results:  No radiology results for the last day    Ambulatory status:   Assist x2      Social issues:   Social History     Socioeconomic  History    Marital status:    Tobacco Use    Smoking status: Former     Packs/day: 0.50     Years: 25.00     Pack years: 12.50     Types: Cigarettes     Start date:      Quit date: 2021     Years since quittin.6    Smokeless tobacco: Never   Vaping Use    Vaping Use: Never used   Substance and Sexual Activity    Alcohol use: Yes     Comment: occasionally     Drug use: No    Sexual activity: Not Currently     Partners: Male       NIH Stroke Scale:         Anny Franklin RN  23 18:51 EST

## 2023-02-05 NOTE — ED PROVIDER NOTES
EMERGENCY DEPARTMENT ENCOUNTER    Room Number:  18/18  Date seen:  2/5/2023  PCP: Ernie Wright APRN  Historian: Patient      HPI:  Chief Complaint: Weakness and fall  A complete HPI/ROS/PMH/PSH/SH/FH are unobtainable due to: Nothing  Context: Gita Avelar is a 78 y.o. female who presents to the ED c/o generalized weakness.  Patient was admitted to the hospital from January 29 through February 2.  Patient was found to have urinary tract infection strep sepsis and COVID.  Patient was discharged home 3 days ago.  States she was weak then has gotten progressively more weak.  Patient is fallen twice today she did not injure herself.  She needed help getting off the floor both times.  Patient's had no chest pain or pressure.  Has had some nausea.  Has had no abdominal pain.  No vomiting or diarrhea.            PAST MEDICAL HISTORY  Active Ambulatory Problems     Diagnosis Date Noted   • Avitaminosis D 07/11/2016   • B12 deficiency 07/11/2016   • Near syncope 07/11/2016   • Atrophic vaginitis 07/11/2016   • Awareness of heartbeats 07/11/2016   • OP (osteoporosis) 07/11/2016   • HLD (hyperlipidemia) 07/11/2016   • Fatigue 07/11/2016   • Essential hypertension 07/11/2016   • Moderate episode of recurrent major depressive disorder (HCC) 11/29/2016   • Nipple discharge 04/13/2017   • Abnormal ultrasound of breast 04/13/2017   • Abnormal mammogram 04/13/2017   • Lump or mass in breast 04/13/2017   • Family history of malignant neoplasm of pancreas 04/13/2017   • Atherosclerosis of both carotid arteries 01/09/2019   • Acute UTI 01/29/2023   • COVID-19 virus infection 01/29/2023   • Hypoxia 01/29/2023   • Streptococcal septicemia (HCC) 01/31/2023     Resolved Ambulatory Problems     Diagnosis Date Noted   • Infection of urinary tract 07/11/2016   • Difficult or painful urination 07/11/2016   • Blues 07/11/2016   • AV (anaerobic vaginosis) 07/11/2016   • Abnormal weight gain 07/11/2016   • Epigastric pain 11/29/2016   •  Major depressive disorder 2018     Past Medical History:   Diagnosis Date   • Arthritis    • Bladder prolapse, female, acquired    • Depression    • Diverticulitis of colon    • History of Guillain-[place] syndrome    • History of subdural hematoma    • Hyperlipidemia    • Macular degeneration of left eye    • Osteoporosis    • Stress incontinence    • Vitamin D deficiency          PAST SURGICAL HISTORY  Past Surgical History:   Procedure Laterality Date   • BREAST BIOPSY     • BREAST BIOPSY Left 2017    Procedure: left breast excisional biopsy using a lacrimal duct probe and the intraoperative ultrasound. ;  Surgeon: Mallory Lieberman MD;  [place] [facility] [place] [facility] [place]  Service:    • HYSTERECTOMY           FAMILY HISTORY  Family History   Problem Relation Age of Onset   • Hypertension Mother    • Pancreatic cancer Mother    • Hypertension Father    • Deep vein thrombosis Father    • Hyperlipidemia Father    • Pancreatic cancer Sister    • Arthritis Sister    • Liver cancer Sister    • Rectal cancer Sister    • Colon cancer Sister    • Pancreatic cancer Brother    • Liver cancer Brother    • Prostate cancer Brother          SOCIAL HISTORY  Social History     Socioeconomic History   • Marital status:    Tobacco Use   • Smoking status: Former     Packs/day: 0.50     Years: 25.00     Pack years: 12.50     Types: Cigarettes     Start date:      Quit date: 2021     Years since quittin.6   • Smokeless tobacco: Never   Vaping Use   • Vaping Use: Never used   Substance and Sexual Activity   • Alcohol use: Yes     Comment: occasionally    • Drug use: No   • Sexual activity: Not Currently     Partners: Male         ALLERGIES  Patient has no known allergies.        REVIEW OF SYSTEMS  Review of Systems   Generalized weakness and nausea      PHYSICAL EXAM  ED Triage Vitals [23 1635]   Temp Heart Rate Resp BP SpO2   98.1 °F (36.7 °C) 80 16 114/78 95 %      Temp src Heart Rate Source Patient Position  BP Location FiO2 (%)   Tympanic Monitor -- -- --       Physical Exam      GENERAL: no acute distress  HENT: nares patent  EYES: no scleral icterus  CV: regular rhythm, normal rate  RESPIRATORY: normal effort  ABDOMEN: soft  MUSCULOSKELETAL: no deformity  NEURO: alert, moves all extremities, follows commands.  Generalized weakness  PSYCH:  calm, cooperative  SKIN: warm, dry    Vital signs and nursing notes reviewed.    Patient was wearing a face mask when I entered the room and they continued to wear a mask throughout their stay in the ED.  I wore PPE, including  gloves, face mask with shield or face mask with goggles whenever I was in the room with patient.       LAB RESULTS  Recent Results (from the past 24 hour(s))   ECG 12 Lead Syncope    Collection Time: 02/05/23  5:38 PM   Result Value Ref Range    QT Interval 426 ms   Comprehensive Metabolic Panel    Collection Time: 02/05/23  5:44 PM    Specimen: Blood   Result Value Ref Range    Glucose 106 (H) 65 - 99 mg/dL    BUN 25 (H) 8 - 23 mg/dL    Creatinine 0.83 0.57 - 1.00 mg/dL    Sodium 137 136 - 145 mmol/L    Potassium 3.7 3.5 - 5.2 mmol/L    Chloride 103 98 - 107 mmol/L    CO2 28.7 22.0 - 29.0 mmol/L    Calcium 9.3 8.6 - 10.5 mg/dL    Total Protein 6.2 6.0 - 8.5 g/dL    Albumin 3.4 (L) 3.5 - 5.2 g/dL    ALT (SGPT) 12 1 - 33 U/L    AST (SGOT) 18 1 - 32 U/L    Alkaline Phosphatase 76 39 - 117 U/L    Total Bilirubin 0.6 0.0 - 1.2 mg/dL    Globulin 2.8 gm/dL    A/G Ratio 1.2 g/dL    BUN/Creatinine Ratio 30.1 (H) 7.0 - 25.0    Anion Gap 5.3 5.0 - 15.0 mmol/L    eGFR 72.3 >60.0 mL/min/1.73   Troponin    Collection Time: 02/05/23  5:44 PM    Specimen: Blood   Result Value Ref Range    Troponin T <0.010 0.000 - 0.030 ng/mL   CBC Auto Differential    Collection Time: 02/05/23  5:44 PM    Specimen: Blood   Result Value Ref Range    WBC 9.15 3.40 - 10.80 10*3/mm3    RBC 4.77 3.77 - 5.28 10*6/mm3    Hemoglobin 14.0 12.0 - 15.9 g/dL    Hematocrit 42.9 34.0 - 46.6 %    MCV  89.9 79.0 - 97.0 fL    MCH 29.4 26.6 - 33.0 pg    MCHC 32.6 31.5 - 35.7 g/dL    RDW 12.9 12.3 - 15.4 %    RDW-SD 42.2 37.0 - 54.0 fl    MPV 10.0 6.0 - 12.0 fL    Platelets 255 140 - 450 10*3/mm3    Neutrophil % 66.7 42.7 - 76.0 %    Lymphocyte % 22.1 19.6 - 45.3 %    Monocyte % 9.5 5.0 - 12.0 %    Eosinophil % 1.0 0.3 - 6.2 %    Basophil % 0.2 0.0 - 1.5 %    Immature Grans % 0.5 0.0 - 0.5 %    Neutrophils, Absolute 6.10 1.70 - 7.00 10*3/mm3    Lymphocytes, Absolute 2.02 0.70 - 3.10 10*3/mm3    Monocytes, Absolute 0.87 0.10 - 0.90 10*3/mm3    Eosinophils, Absolute 0.09 0.00 - 0.40 10*3/mm3    Basophils, Absolute 0.02 0.00 - 0.20 10*3/mm3    Immature Grans, Absolute 0.05 0.00 - 0.05 10*3/mm3    nRBC 0.0 0.0 - 0.2 /100 WBC   Urinalysis With Microscopic If Indicated (No Culture) - Urine, Clean Catch    Collection Time: 02/05/23  5:58 PM    Specimen: Urine, Clean Catch   Result Value Ref Range    Color, UA Yellow Yellow, Straw    Appearance, UA Clear Clear    pH, UA 6.5 5.0 - 8.0    Specific Gravity, UA 1.021 1.005 - 1.030    Glucose, UA Negative Negative    Ketones, UA Negative Negative    Bilirubin, UA Negative Negative    Blood, UA Negative Negative    Protein, UA Negative Negative    Leuk Esterase, UA Negative Negative    Nitrite, UA Negative Negative    Urobilinogen, UA 0.2 E.U./dL 0.2 - 1.0 E.U./dL       Ordered the above labs and reviewed the results.        RADIOLOGY  XR Chest 1 View    Result Date: 2/5/2023  STAT PORTABLE RADIOGRAPHIC VIEW OF THE CHEST  CLINICAL HISTORY: Shortness of breath.  FINDINGS:  The cardiomediastinal silhouette is mildly enlarged but stable in size. There is particular prominence of the size of the aortic arch which I suspect is aneurysmally dilated. This could be further characterized with a CT scan of the chest as clinically indicated. The aortic arch has a similar appearance when compared to the prior study dated 01/29/2023 but prominently increased in size when compared to the prior  study dated 11/29/2010. Otherwise, the lungs are clear of acute infiltrates. The osseous structures are incidentally notable for prominent osteoarthritic changes within the shoulders.  These findings and recommendations were discussed with Dr. Krueger on 02/05/2023 at approximately 5:39 PM.          Ordered the above noted radiological studies.  Chest x-ray independently interpreted by me in PACS and has no evidence of pneumonia          PROCEDURES  Procedures    EKG          EKG time: 1738  Rhythm/Rate: Normal sinus rhythm 66  P waves and IA: Normal P waves  QRS, axis: Normal QRS  ST and T waves: Nonspecific ST-T waves    Interpreted Contemporaneously by me, independently viewed  unchanged compared to prior 1/29/2023          MEDICATIONS GIVEN IN ER  Medications   lactated ringers bolus 1,000 mL (1,000 mL Intravenous New Bag 2/5/23 1758)                   MEDICAL DECISION MAKING, PROGRESS, and CONSULTS    All labs have been independently reviewed by me.  All radiology studies have been reviewed by me and I have also reviewed the radiology report.   EKG's independently viewed and interpreted by me.  Discussion below represents my analysis of pertinent findings related to patient's condition, differential diagnosis, treatment plan and final disposition.      Additional sources:  - Discussed/ obtained information from independent historians: Family is here and states patient does seem to be more weak than she was 2 days ago    - External (non-ED) record review: Epic reviewed and patient was admitted here 1/29 for UTI weakness and COVID    - Chronic or social conditions impacting care: None    - Shared decision making: Discussed options with patient and family and they feel she is too unsafe to go home      Orders placed during this visit:  Orders Placed This Encounter   Procedures   • COVID PRE-OP / PRE-PROCEDURE SCREENING ORDER (NO ISOLATION) - Swab, Nasopharynx   • COVID-19,[facility] IN-HOUSE CEPHEID/GENEVA NP SWAB IN  TRANSPORT MEDIA 8-12 HR TAT - Swab, Nasopharynx   • XR Chest 1 View   • Comprehensive Metabolic Panel   • Urinalysis With Microscopic If Indicated (No Culture) - Urine, Clean Catch   • Troponin   • CBC Auto Differential   • [facility] (on-call MD unless specified) Details   • ECG 12 Lead Syncope   • Initiate Observation Status   • CBC & Differential         Additional orders considered but not ordered:  None        Differential diagnosis:    Includes but not limited to COVID infection, generalized weakness, CVA      Independent interpretation of labs, radiology studies, and discussions with consultants:  ED Course as of 02/05/23 1838   Sun Feb 05, 2023 1834 18:34 EST  Patient presents for weakness and multiple falls.  Patient was just admitted to the hospital for COVID infection as well as urinary tract infection.  She got out a couple days ago.  Family states that she is gradually gotten more weak again to the point that she fell twice today and was unable to get off the floor.  She does appear to be slightly dehydrated.  Patient and family feel she probably needed to go to rehab.  Discussed with CCP and they will not be able to get this to happen today.  Discussed with Dr. Jacobson who will admit [SL]      ED Course User Index  [SL] Otf Krueger MD                 DIAGNOSIS  Final diagnoses:   Generalized weakness   Dehydration   Fall, initial encounter   COVID-19 virus infection         DISPOSITION  admit            Latest Documented Vital Signs:  As of 18:38 EST  BP- 114/78 HR- 80 Temp- 98.1 °F (36.7 °C) (Tympanic) O2 sat- 95%              --    Please note that portions of this were completed with a voice recognition program.       Note Disclaimer: At [facility], we believe that sharing information builds trust and better relationships. You are receiving this note because you are receiving care at [facility] or recently visited. It is possible you will see health information before a provider has talked  with you about it. This kind of information can be easy to misunderstand. To help you fully understand what it means for your health, we urge you to discuss this note with your provider.           Otf Krueger MD  02/05/23 0321

## 2023-02-05 NOTE — ED TRIAGE NOTES
Dizzy, multiple falls, weakness.  Recent admission for uti and covid.  Denies syncope.  She hit her head when she fell - no loc.  No blood thinners per pt    Patient was placed in face mask during first look triage.  Patient was wearing a face mask throughout encounter.  I wore personal protective equipment throughout the encounter.  Hand hygiene was performed before and after patient encounter.

## 2023-02-06 ENCOUNTER — APPOINTMENT (OUTPATIENT)
Dept: CT IMAGING | Facility: HOSPITAL | Age: 78
DRG: 91 | End: 2023-02-06
Payer: MEDICARE

## 2023-02-06 PROBLEM — R42 DIZZINESS ON STANDING: Status: ACTIVE | Noted: 2023-02-06

## 2023-02-06 PROBLEM — G90.1 DYSAUTONOMIA: Status: ACTIVE | Noted: 2023-02-06

## 2023-02-06 LAB
ANION GAP SERPL CALCULATED.3IONS-SCNC: 3.1 MMOL/L (ref 5–15)
BASOPHILS # BLD AUTO: 0.02 10*3/MM3 (ref 0–0.2)
BASOPHILS NFR BLD AUTO: 0.3 % (ref 0–1.5)
BUN SERPL-MCNC: 21 MG/DL (ref 8–23)
BUN/CREAT SERPL: 31.8 (ref 7–25)
CALCIUM SPEC-SCNC: 8.7 MG/DL (ref 8.6–10.5)
CHLORIDE SERPL-SCNC: 107 MMOL/L (ref 98–107)
CO2 SERPL-SCNC: 29.9 MMOL/L (ref 22–29)
CREAT SERPL-MCNC: 0.66 MG/DL (ref 0.57–1)
DEPRECATED RDW RBC AUTO: 42.3 FL (ref 37–54)
EGFRCR SERPLBLD CKD-EPI 2021: 89.9 ML/MIN/1.73
EOSINOPHIL # BLD AUTO: 0.12 10*3/MM3 (ref 0–0.4)
EOSINOPHIL NFR BLD AUTO: 1.9 % (ref 0.3–6.2)
ERYTHROCYTE [DISTWIDTH] IN BLOOD BY AUTOMATED COUNT: 12.7 % (ref 12.3–15.4)
GLUCOSE SERPL-MCNC: 86 MG/DL (ref 65–99)
HCT VFR BLD AUTO: 35.9 % (ref 34–46.6)
HGB BLD-MCNC: 11.6 G/DL (ref 12–15.9)
IMM GRANULOCYTES # BLD AUTO: 0.02 10*3/MM3 (ref 0–0.05)
IMM GRANULOCYTES NFR BLD AUTO: 0.3 % (ref 0–0.5)
LYMPHOCYTES # BLD AUTO: 2.43 10*3/MM3 (ref 0.7–3.1)
LYMPHOCYTES NFR BLD AUTO: 37.9 % (ref 19.6–45.3)
MCH RBC QN AUTO: 29.5 PG (ref 26.6–33)
MCHC RBC AUTO-ENTMCNC: 32.3 G/DL (ref 31.5–35.7)
MCV RBC AUTO: 91.3 FL (ref 79–97)
MONOCYTES # BLD AUTO: 0.78 10*3/MM3 (ref 0.1–0.9)
MONOCYTES NFR BLD AUTO: 12.1 % (ref 5–12)
NEUTROPHILS NFR BLD AUTO: 3.05 10*3/MM3 (ref 1.7–7)
NEUTROPHILS NFR BLD AUTO: 47.5 % (ref 42.7–76)
NRBC BLD AUTO-RTO: 0 /100 WBC (ref 0–0.2)
PLATELET # BLD AUTO: 203 10*3/MM3 (ref 140–450)
PMV BLD AUTO: 10.1 FL (ref 6–12)
POTASSIUM SERPL-SCNC: 3.5 MMOL/L (ref 3.5–5.2)
RBC # BLD AUTO: 3.93 10*6/MM3 (ref 3.77–5.28)
SODIUM SERPL-SCNC: 140 MMOL/L (ref 136–145)
WBC NRBC COR # BLD: 6.42 10*3/MM3 (ref 3.4–10.8)

## 2023-02-06 PROCEDURE — 36415 COLL VENOUS BLD VENIPUNCTURE: CPT | Performed by: INTERNAL MEDICINE

## 2023-02-06 PROCEDURE — 97535 SELF CARE MNGMENT TRAINING: CPT

## 2023-02-06 PROCEDURE — 70450 CT HEAD/BRAIN W/O DYE: CPT

## 2023-02-06 PROCEDURE — 97166 OT EVAL MOD COMPLEX 45 MIN: CPT

## 2023-02-06 PROCEDURE — G0378 HOSPITAL OBSERVATION PER HR: HCPCS

## 2023-02-06 PROCEDURE — 85025 COMPLETE CBC W/AUTO DIFF WBC: CPT | Performed by: INTERNAL MEDICINE

## 2023-02-06 PROCEDURE — 99222 1ST HOSP IP/OBS MODERATE 55: CPT | Performed by: PSYCHIATRY & NEUROLOGY

## 2023-02-06 PROCEDURE — 97530 THERAPEUTIC ACTIVITIES: CPT

## 2023-02-06 PROCEDURE — 25010000002 SODIUM CHLORIDE 0.9 % WITH KCL 20 MEQ 20-0.9 MEQ/L-% SOLUTION: Performed by: INTERNAL MEDICINE

## 2023-02-06 PROCEDURE — 97162 PT EVAL MOD COMPLEX 30 MIN: CPT

## 2023-02-06 PROCEDURE — 80048 BASIC METABOLIC PNL TOTAL CA: CPT | Performed by: INTERNAL MEDICINE

## 2023-02-06 RX ORDER — ENOXAPARIN SODIUM 100 MG/ML
40 INJECTION SUBCUTANEOUS EVERY 24 HOURS
Status: DISCONTINUED | OUTPATIENT
Start: 2023-02-06 | End: 2023-02-06

## 2023-02-06 RX ORDER — BENZONATATE 100 MG/1
100 CAPSULE ORAL 3 TIMES DAILY PRN
Status: DISCONTINUED | OUTPATIENT
Start: 2023-02-06 | End: 2023-02-09 | Stop reason: HOSPADM

## 2023-02-06 RX ORDER — CEPHALEXIN 500 MG/1
1000 CAPSULE ORAL EVERY 8 HOURS SCHEDULED
Status: DISCONTINUED | OUTPATIENT
Start: 2023-02-06 | End: 2023-02-09 | Stop reason: HOSPADM

## 2023-02-06 RX ORDER — LISINOPRIL 20 MG/1
20 TABLET ORAL DAILY
Status: DISCONTINUED | OUTPATIENT
Start: 2023-02-07 | End: 2023-02-07

## 2023-02-06 RX ADMIN — CEPHALEXIN 1000 MG: 500 CAPSULE ORAL at 22:02

## 2023-02-06 RX ADMIN — Medication 1000 UNITS: at 08:06

## 2023-02-06 RX ADMIN — BUSPIRONE HYDROCHLORIDE 5 MG: 5 TABLET ORAL at 08:06

## 2023-02-06 RX ADMIN — DULOXETINE HYDROCHLORIDE 60 MG: 60 CAPSULE, DELAYED RELEASE ORAL at 08:06

## 2023-02-06 RX ADMIN — CEPHALEXIN 1000 MG: 500 CAPSULE ORAL at 15:03

## 2023-02-06 RX ADMIN — BUSPIRONE HYDROCHLORIDE 5 MG: 5 TABLET ORAL at 22:01

## 2023-02-06 RX ADMIN — CARVEDILOL 12.5 MG: 12.5 TABLET, FILM COATED ORAL at 18:16

## 2023-02-06 RX ADMIN — BUSPIRONE HYDROCHLORIDE 5 MG: 5 TABLET ORAL at 16:30

## 2023-02-06 RX ADMIN — POTASSIUM CHLORIDE AND SODIUM CHLORIDE 100 ML/HR: 900; 150 INJECTION, SOLUTION INTRAVENOUS at 13:01

## 2023-02-06 RX ADMIN — ASPIRIN 81 MG: 81 TABLET, COATED ORAL at 08:06

## 2023-02-06 RX ADMIN — ROSUVASTATIN CALCIUM 20 MG: 20 TABLET, FILM COATED ORAL at 22:01

## 2023-02-06 RX ADMIN — CARVEDILOL 12.5 MG: 12.5 TABLET, FILM COATED ORAL at 08:06

## 2023-02-06 RX ADMIN — LISINOPRIL 40 MG: 40 TABLET ORAL at 08:06

## 2023-02-06 RX ADMIN — MULTIPLE VITAMINS W/ MINERALS TAB 1 TABLET: TAB at 08:06

## 2023-02-06 NOTE — PLAN OF CARE
Goal Outcome Evaluation:  Plan of Care Reviewed With: patient        Progress: no change  Outcome Evaluation: Pt admitted through the ED w/ dehydration and generalized weakness, A/O, forgetful at times, enhanced contact/ droplet precautions initiated, falls precautions, up to BSC w/ assist X1, IVF infusing, iv antibiotic given per order, Tylenol given for headache w/ relief, tolerating regular diet

## 2023-02-06 NOTE — H&P
HISTORY AND PHYSICAL   [facility]        Date of Admission: 2023  Patient Identification:  Name: Gita Avelar  Age: 78 y.o.  Sex: female  :  1945  MRN: [id number]                     Primary Care Physician: Ernie Wright APRN    Chief Complaint:  78 year old female who presented to the emergency room with weakness and after a fall; she was admitted for a uti and covid and went home 3 days ago; she has not been able to function at home and has fallen twice with worsening generalized weakness; she has had nausea but no vomiting; no fever or chills    History of Present Illness:   As above    Past Medical History:  Past Medical History:   Diagnosis Date   • Arthritis     HANDS   • B12 deficiency    • Bladder prolapse, female, acquired    • Depression    • Diverticulitis of colon    • History of Guillain-[place] syndrome    • History of subdural hematoma    • Hyperlipidemia    • Macular degeneration of left eye    • Osteoporosis    • Stress incontinence    • Vitamin D deficiency      Past Surgical History:  Past Surgical History:   Procedure Laterality Date   • BREAST BIOPSY     • BREAST BIOPSY Left 2017    Procedure: left breast excisional biopsy using a lacrimal duct probe and the intraoperative ultrasound. ;  Surgeon: Mallory Lieberman MD;  [place] [facility] [place] [facility] [place]  Service:    • HYSTERECTOMY        Home Meds:  Medications Prior to Admission   Medication Sig Dispense Refill Last Dose   • acetaminophen (TYLENOL) 500 MG tablet Take 1,000 mg by mouth Every 6 (Six) Hours As Needed for Mild Pain.      • aspirin 81 MG tablet Take 81 mg by mouth Daily.      • benzonatate (TESSALON) 100 MG capsule Take 1 capsule by mouth 3 (Three) Times a Day As Needed for Cough for up to 4 days. 12 capsule 0    • bisacodyl (DULCOLAX) 5 MG EC tablet Take 5 mg by mouth Daily As Needed for Constipation. Indications: Constipation      • busPIRone (BUSPAR) 5 MG tablet Take 1 tablet by mouth 3 (Three) Times a Day.  90 tablet 0    • carvedilol (COREG) 12.5 MG tablet Take 1 tablet by mouth 2 (Two) Times a Day With Meals for 30 days. 60 tablet 0    • cephalexin (KEFLEX) 500 MG capsule Take 2 capsules by mouth Every 6 (Six) Hours for 35 doses. Indications: bacteremia 70 capsule 0    • cholecalciferol (VITAMIN D3) 1000 UNITS tablet Take 1,000 Units by mouth Daily.      • DULoxetine (CYMBALTA) 60 MG capsule Take 1 capsule by mouth Daily. Needs FU 90 capsule 0    • lisinopril (PRINIVIL,ZESTRIL) 40 MG tablet Take 40 mg by mouth Daily.      • meclizine (ANTIVERT) 25 MG tablet Take 1 tablet by mouth 3 (Three) Times a Day As Needed for Dizziness. (Patient not taking: Reported on 2023) 21 tablet 0    • Multiple Vitamins-Minerals (PRESERVISION AREDS PO) Take 1 tablet by mouth Daily.      • rosuvastatin (Crestor) 20 MG tablet Take 1 tablet by mouth Every Night. 90 tablet 1        Allergies:  No Known Allergies  Immunizations:    There is no immunization history on file for this patient.  Social History:   Social History     Social History Narrative   • Not on file     Social History     Socioeconomic History   • Marital status:    Tobacco Use   • Smoking status: Former     Packs/day: 0.50     Years: 25.00     Pack years: 12.50     Types: Cigarettes     Start date:      Quit date: 2021     Years since quittin.6   • Smokeless tobacco: Never   Vaping Use   • Vaping Use: Never used   Substance and Sexual Activity   • Alcohol use: Yes     Comment: occasionally    • Drug use: No   • Sexual activity: Not Currently     Partners: Male       Family History:  Family History   Problem Relation Age of Onset   • Hypertension Mother    • Pancreatic cancer Mother    • Hypertension Father    • Deep vein thrombosis Father    • Hyperlipidemia Father    • Pancreatic cancer Sister    • Arthritis Sister    • Liver cancer Sister    • Rectal cancer Sister    • Colon cancer Sister    • Pancreatic cancer Brother    • Liver cancer Brother    •  Prostate cancer Brother         Review of Systems  See history of present illness and past medical history.  Patient denies headache, dizziness, syncope, falls, trauma, change in vision, change in hearing, change in taste, changes in weight, changes in appetite, focal weakness, numbness, or paresthesia.  Patient denies chest pain, palpitations, dyspnea, orthopnea, PND, cough, sinus pressure, rhinorrhea, epistaxis, hemoptysis, nausea, vomiting,hematemesis, diarrhea, constipation or hematchezia.  Denies cold or heat intolerance, polydipsia, polyuria, polyphagia. Denies hematuria, pyuria, dysuria, hesitancy, frequency or urgency. Denies consumption of raw and under cooked meats foods or change in water source.  Denies fever, chills, sweats, night sweats.  Denies missing any routine medications. Remainder of ROS is negative.    Objective:  T Max 24 hrs: Temp (24hrs), Av.1 °F (36.7 °C), Min:98.1 °F (36.7 °C), Max:98.1 °F (36.7 °C)    Vitals Ranges:   Temp:  [98.1 °F (36.7 °C)] 98.1 °F (36.7 °C)  Heart Rate:  [59-80] 71  Resp:  [16] 16  BP: (114-180)/(78-97) 180/97      Exam:  /97   Pulse 71   Temp 98.1 °F (36.7 °C) (Tympanic)   Resp 16   SpO2 94%     General Appearance:    Alert, cooperative, no distress, appears stated age   Head:    Normocephalic, without obvious abnormality, atraumatic   Eyes:    PERRL, conjunctivae/corneas clear, EOM's intact, both eyes   Ears:    Normal external ear canals, both ears   Nose:   Nares normal, septum midline, mucosa normal, no drainage    or sinus tenderness   Throat:   Lips, mucosa, and tongue normal   Neck:   Supple, symmetrical, trachea midline, no adenopathy;     thyroid:  no enlargement/tenderness/nodules; no carotid    bruit or JVD   Back:     Symmetric, no curvature, ROM normal, no CVA tenderness   Lungs:     Clear to auscultation bilaterally, respirations unlabored   Chest Wall:    No tenderness or deformity    Heart:    Regular rate and rhythm, S1 and S2 normal,  no murmur, rub   or gallop   Abdomen:     Soft, nontender, bowel sounds active all four quadrants,     no masses, no hepatomegaly, no splenomegaly   Extremities:   Extremities normal, atraumatic, no cyanosis or edema   Pulses:   2+ and symmetric all extremities   Skin:   Skin color, texture, turgor normal, no rashes or lesions               .    Data Review:  Labs in chart were reviewed.  WBC   Date Value Ref Range Status   02/05/2023 9.15 3.40 - 10.80 10*3/mm3 Final     Hemoglobin   Date Value Ref Range Status   02/05/2023 14.0 12.0 - 15.9 g/dL Final     Hematocrit   Date Value Ref Range Status   02/05/2023 42.9 34.0 - 46.6 % Final     Platelets   Date Value Ref Range Status   02/05/2023 255 140 - 450 10*3/mm3 Final     Sodium   Date Value Ref Range Status   02/05/2023 137 136 - 145 mmol/L Final     Potassium   Date Value Ref Range Status   02/05/2023 3.7 3.5 - 5.2 mmol/L Final     Comment:     Slight hemolysis detected by analyzer. Results may be affected.     Chloride   Date Value Ref Range Status   02/05/2023 103 98 - 107 mmol/L Final     CO2   Date Value Ref Range Status   02/05/2023 28.7 22.0 - 29.0 mmol/L Final     BUN   Date Value Ref Range Status   02/05/2023 25 (H) 8 - 23 mg/dL Final     Creatinine   Date Value Ref Range Status   02/05/2023 0.83 0.57 - 1.00 mg/dL Final     Glucose   Date Value Ref Range Status   02/05/2023 106 (H) 65 - 99 mg/dL Final     Calcium   Date Value Ref Range Status   02/05/2023 9.3 8.6 - 10.5 mg/dL Final     AST (SGOT)   Date Value Ref Range Status   02/05/2023 18 1 - 32 U/L Final     ALT (SGPT)   Date Value Ref Range Status   02/05/2023 12 1 - 33 U/L Final     Alkaline Phosphatase   Date Value Ref Range Status   02/05/2023 76 39 - 117 U/L Final                Imaging Results (All)     Procedure Component Value Units Date/Time    XR Chest 1 View [[id number]] Collected: 02/05/23 1803     Updated: 02/05/23 1906    Narrative:      STAT PORTABLE RADIOGRAPHIC VIEW OF THE CHEST      CLINICAL HISTORY: Shortness of breath.     FINDINGS:     The cardiomediastinal silhouette is mildly enlarged but stable in size.  There is particular prominence of the size of the aortic arch which I  suspect is aneurysmally dilated. This could be further characterized  with a CT scan of the chest as clinically indicated. The aortic arch has  a similar appearance when compared to the prior study dated 01/29/2023  but prominently increased in size when compared to the prior study dated  11/29/2010. Otherwise, the lungs are clear of acute infiltrates. The  osseous structures are incidentally notable for prominent osteoarthritic  changes within the shoulders.     These findings and recommendations were discussed with Dr. Krueger on  02/05/2023 at approximately 5:39 PM.     This report was finalized on 2/5/2023 7:03 PM by Dr. Jack Butt M.D.               Assessment:  Active Hospital Problems    Diagnosis  POA   • **Generalized weakness [R53.1]  Yes      Resolved Hospital Problems   No resolved problems to display.   falls  Underweight  Hypertension  hyperglycemia    Plan:  Will ask neurology to see her  She has a history of guillain barre syndrome  Pt.ot to see  Fluids  Monitor blood sugar  May need to consider rehab  D/w patient and ED provider    Za Jacobson MD  2/5/2023  20:38 EST

## 2023-02-06 NOTE — THERAPY EVALUATION
Patient Name: Gita Avelar  : 1945    MRN: [id number]                              Today's Date: 2023       Admit Date: 2023    Visit Dx:     ICD-10-CM ICD-9-CM   1. Generalized weakness  R53.1 780.79   2. Dehydration  E86.0 276.51   3. Fall, initial encounter  W19.XXXA E888.9   4. COVID-19 virus infection  U07.1 079.89     Patient Active Problem List   Diagnosis   • Avitaminosis D   • B12 deficiency   • Near syncope   • Atrophic vaginitis   • Awareness of heartbeats   • OP (osteoporosis)   • HLD (hyperlipidemia)   • Fatigue   • Essential hypertension   • Moderate episode of recurrent major depressive disorder (HCC)   • Nipple discharge   • Abnormal ultrasound of breast   • Abnormal mammogram   • Lump or mass in breast   • Family history of malignant neoplasm of pancreas   • Atherosclerosis of both carotid arteries   • Acute UTI   • COVID-19 virus infection   • Hypoxia   • Streptococcal septicemia (HCC)   • Generalized weakness   • Dizziness on standing   • History of Guillain-Lakeside syndrome   • History of subdural hematoma   • Dysautonomia (HCC)     Past Medical History:   Diagnosis Date   • Arthritis     HANDS   • B12 deficiency    • Bladder prolapse, female, acquired    • Depression    • Diverticulitis of colon    • History of Guillain-Lakeside syndrome    • History of subdural hematoma    • Hyperlipidemia    • Macular degeneration of left eye    • Osteoporosis    • Stress incontinence    • Vitamin D deficiency      Past Surgical History:   Procedure Laterality Date   • BREAST BIOPSY     • BREAST BIOPSY Left 2017    Procedure: left breast excisional biopsy using a lacrimal duct probe and the intraoperative ultrasound. ;  Surgeon: Mallory Lieberman MD;  [place] [facility] [place] [facility] [place]  Service:    • HYSTERECTOMY        General Information     Row Name 23 1341          Physical Therapy Time and Intention    Document Type evaluation (P)   -MB     Mode of Treatment co-treatment;physical  therapy;occupational therapy (P)   -MB     Row Name 02/06/23 1341          General Information    Patient Profile Reviewed yes (P)   -MB     Prior Level of Function independent: (P)   -MB     Existing Precautions/Restrictions fall (P)   -MB     Barriers to Rehab previous functional deficit (P)   -MB     Row Name 02/06/23 1341          Living Environment    People in Home alone (P)   -MB     Row Name 02/06/23 1341          Cognition    Orientation Status (Cognition) oriented x 3 (P)   -MB     Row Name 02/06/23 1341          Safety Issues, Functional Mobility    Safety Issues Affecting Function (Mobility) insight into deficits/self-awareness;safety precaution awareness;safety precautions follow-through/compliance;sequencing abilities (P)   -MB     Impairments Affecting Function (Mobility) balance;pain;strength;endurance/activity tolerance (P)   -MB           User Key  (r) = Recorded By, (t) = Taken By, (c) = Cosigned By    Initials Name Provider Type    Henok Gonzalez, PT Student PT Student               Mobility     Row Name 02/06/23 1342          Bed Mobility    Comment, (Bed Mobility) NT - [facility] upon arrival (P)   -MB     Row Name 02/06/23 1342          Transfers    Comment, (Transfers) STS x2 from chair and commode, increased time to complete task, no unsteadiness or overt LOB observed (P)   -MB     Row Name 02/06/23 1342          Sit-Stand Transfer    Sit-Stand [place]asper (Transfers) contact guard;1 person assist (P)   -MB     Assistive Device (Sit-Stand Transfers) walker, front-wheeled (P)   -MB     Row Name 02/06/23 1342          Gait/Stairs (Locomotion)    Jasper Level (Gait) contact guard;1 person assist (P)   -MB     Assistive Device (Gait) walker, front-wheeled (P)   -MB     Distance in Feet (Gait) 25' total (P)   -MB     Deviations/Abnormal Patterns (Gait) dexter decreased;festinating/shuffling;gait speed decreased;stride length decreased;base of support, narrow (P)   -MB     Bilateral Gait  Deviations forward flexed posture (P)   -MB     Curry Level (Stairs) not tested (P)   -MB     Comment, (Gait/Stairs) decreased gait speed, increase in short shuffling steps with fatigue, no reports of dizziness while ambulating today (P)   -MB           User Key  (r) = Recorded By, (t) = Taken By, (c) = Cosigned By    Initials Name Provider Type    Henok Gonzalez, PT Student PT Student               Obj/Interventions     Row Name 02/06/23 1345          Motor Skills    Motor Skills functional endurance (P)   -MB     Functional Endurance poor (P)   -MB     Row Name 02/06/23 1345          Balance    Balance Assessment sitting static balance;sit to stand dynamic balance;standing static balance (P)   -MB     Static Sitting Balance standby assist (P)   -MB     Position, Sitting Balance sitting in chair (P)   -MB     Sit to Stand Dynamic Balance contact guard;1-person assist (P)   -MB     Static Standing Balance contact guard;1-person assist (P)   -MB     Position/Device Used, Standing Balance supported;walker, front-wheeled (P)   -MB     Balance Interventions sitting;standing;sit to stand;supported;static;dynamic;minimal challenge (P)   -MB           User Key  (r) = Recorded By, (t) = Taken By, (c) = Cosigned By    Initials Name Provider Type    Henok Gonzalez, PT Student PT Student               Goals/Plan     Row Name 02/06/23 1402          Bed Mobility Goal 1 (PT)    Activity/Assistive Device (Bed Mobility Goal 1, PT) bed mobility activities, all (P)   -MB     Curry Level/Cues Needed (Bed Mobility Goal 1, PT) standby assist (P)   -MB     Time Frame (Bed Mobility Goal 1, PT) 1 week (P)   -MB     Row Name 02/06/23 1402          Transfer Goal 1 (PT)    Activity/Assistive Device (Transfer Goal 1, PT) transfers, all (P)   -MB     Curry Level/Cues Needed (Transfer Goal 1, PT) standby assist (P)   -MB     Time Frame (Transfer Goal 1, PT) 1 week (P)   -MB     Row Name 02/06/23 1402          Gait  "Training Goal 1 (PT)    Activity/Assistive Device (Gait Training Goal 1, PT) gait (walking locomotion);assistive device use;decrease fall risk (P)   -MB     Miller Level (Gait Training Goal 1, PT) contact guard required (P)   -MB     Distance (Gait Training Goal 1, PT) 40' (P)   -MB     Time Frame (Gait Training Goal 1, PT) 1 week (P)   -MB           User Key  (r) = Recorded By, (t) = Taken By, (c) = Cosigned By    Initials Name Provider Type    Henok Gonzalez, PT Student PT Student               Clinical Impression     Row Name 02/06/23 1345          Pain    Pain Location lower (P)   -MB     Pain Location - back (P)   -MB     Pre/Posttreatment Pain Comment reports pain in low back, no numerical value given (P)   -MB     Pain Intervention(s) Rest;Ambulation/increased activity;Elevated;Repositioned (P)   -MB     Row Name 02/06/23 7015          Plan of Care Review    Plan of Care Reviewed With patient (P)   -MB     Outcome Evaluation Pt re-admitted to [facility] from home for falls, generalized weakness, dehydration, and Covid-19 infection. Seen for PT eval today. Pt admitted to [facility] from 1/29-2/2 for Covid-19 infection and was evaluated by PT last week. PLOF home alone, states she is mostly IND with some assist from son. Pt uses RW at baseline for ambulation. Describes episodes of dizziness leading to falls, denies feeling of room spinning around her. States she does feel like she \"blacks out\" for a short period when these episodes occur, causing her to fall. Pt [facility] on arrival. STS x2 from chair and commode CGAx1 w RW. Denies dizziness or lightheadedness today with all mobility tasks. Amb 25' total CGAx1 w RW with seated rest break on commode limited further mobility d/t fatigue. Demos decreased gait speed, shuffling steps with increased fatigue, and low activity tolerance. PT recommends d/c SNF at this time for further rehab to improve towards baseline. Pt would benefit from skilled PT to address stated deficits. " (P)   -MB     Row Name 02/06/23 1345          Therapy Assessment/Plan (PT)    Rehab Potential (PT) good, to achieve stated therapy goals (P)   -MB     Criteria for Skilled Interventions Met (PT) yes (P)   -MB     Therapy Frequency (PT) 5 times/wk (P)   -MB     Row Name 02/06/23 1345          Positioning and Restraints    Pre-Treatment Position sitting in chair/recliner (P)   -MB     Post Treatment Position chair (P)   -MB     In Chair reclined;call light within reach;encouraged to call for assist;exit alarm on;notified nsg;legs elevated (P)   -MB           User Key  (r) = Recorded By, (t) = Taken By, (c) = Cosigned By    Initials Name Provider Type    Henok Gonzalez, PT Student PT Student               Outcome Measures     Row Name 02/06/23 1402 02/06/23 0803       How much help from another person do you currently need...    Turning from your back to your side while in flat bed without using bedrails? 4 (P)   -MB 4  -MC    Moving from lying on back to sitting on the side of a flat bed without bedrails? 4 (P)   -MB 4  -MC    Moving to and from a bed to a chair (including a wheelchair)? 3 (P)   -MB 3  -MC    Standing up from a chair using your arms (e.g., wheelchair, bedside chair)? 3 (P)   -MB 4  -MC    Climbing 3-5 steps with a railing? 2 (P)   -MB 3  -MC    To walk in hospital room? 2 (P)   -MB 3  -MC    AM-PAC 6 Clicks Score (PT) 18 (P)   -MB 21  -MC    Highest level of mobility 6 --> Walked 10 steps or more (P)   -MB 6 --> Walked 10 steps or more  -MC    Row Name 02/06/23 1402          Functional Assessment    Outcome Measure Options AM-PAC 6 Clicks Basic Mobility (PT) (P)   -MB           User Key  (r) = Recorded By, (t) = Taken By, (c) = Cosigned By    Initials Name Provider Type    Megha Doyle, RN Registered Nurse    Henok Gonzalez, PT Student PT Student                             Physical Therapy Education     Title: PT OT SLP Therapies (In Progress)     Topic: Physical Therapy (In Progress)      "Point: Mobility training (Done)     Learning Progress Summary           Patient Acceptance, E,TB, VU by MB at 2/6/2023 1403                   Point: Home exercise program (Not Started)     Learner Progress:  Not documented in this visit.          Point: Body mechanics (Not Started)     Learner Progress:  Not documented in this visit.          Point: Precautions (Not Started)     Learner Progress:  Not documented in this visit.                      User Key     Initials Effective Dates Name Provider Type Discipline    MB 12/30/22 -  Henok Erazo, PT Student PT Student PT              PT Recommendation and Plan     Plan of Care Reviewed With: (P) patient  Outcome Evaluation: (P) Pt re-admitted to [facility] from home for falls, generalized weakness, dehydration, and Covid-19 infection. Seen for PT eval today. Pt admitted to [facility] from 1/29-2/2 for Covid-19 infection and was evaluated by PT last week. PLOF home alone, states she is mostly IND with some assist from son. Pt uses RW at baseline for ambulation. Describes episodes of dizziness leading to falls, denies feeling of room spinning around her. States she does feel like she \"blacks out\" for a short period when these episodes occur, causing her to fall. Pt [facility] on arrival. STS x2 from chair and commode CGAx1 w RW. Denies dizziness or lightheadedness today with all mobility tasks. Amb 25' total CGAx1 w RW with seated rest break on commode limited further mobility d/t fatigue. Demos decreased gait speed, shuffling steps with increased fatigue, and low activity tolerance. PT recommends d/c SNF at this time for further rehab to improve towards baseline. Pt would benefit from skilled PT to address stated deficits.     Time Calculation:    PT Charges     Row Name 02/06/23 1340             Time Calculation    Start Time 1309 (P)   -MB      Stop Time 1329 (P)   -MB      Time Calculation (min) 20 min (P)   -MB      PT Received On 02/06/23 (P)   -MB      PT - Next Appointment " 02/07/23 (P)   -MB      PT Goal Re-Cert Due Date 02/13/23 (P)   -MB         Time Calculation- PT    Total Timed Code Minutes- PT 20 minute(s) (P)   -MB            User Key  (r) = Recorded By, (t) = Taken By, (c) = Cosigned By    Initials Name Provider Type    Henok Gonzalez, PT Student PT Student              Therapy Charges for Today     Code Description Service Date Service Provider Modifiers Qty    64019049672 HC PT EVAL MOD COMPLEXITY 2 2/6/2023 Henok Erazo, PT Student GP 1    10989910337 HC PT THERAPEUTIC ACT EA 15 MIN 2/6/2023 Henok Erazo, PT Student GP 1          PT G-Codes  Outcome Measure Options: (P) AM-PAC 6 Clicks Basic Mobility (PT)  AM-PAC 6 Clicks Score (PT): (P) 18  PT Discharge Summary  Anticipated Discharge Disposition (PT): (P) skilled nursing facility    Henok Erazo PT Student  2/6/2023

## 2023-02-06 NOTE — PLAN OF CARE
Goal Outcome Evaluation:              Outcome Evaluation: VSS. SL. Orthostatic BP done. Fall precautions maintained. Enhanced Droplet/Contact precautions maintained. Sat up in chair. Worked with OT and PT. CT head showed 3mm of hyperdensity, MRI ordered and hopefully to be done tonight.

## 2023-02-06 NOTE — CONSULTS
"  Patient Identification:  NAME:  Gita Avelar  Age:  78 y.o.   Sex:  female   :  1945   MRN:  [id number]       Chief complaint: I get dizzy, reason for consult dizziness    History of present illness: This patient is a 78-year-old right-handed white female with a history of previous subdural hematoma, history of Guillain-Barré syndrome, B12 deficiency who comes to the hospital after several days duration of dizziness.  It is not a vertigo type feeling but more of a lightheaded feeling she states \"her vision blacks out and she gets weak\".  Duration more than a few minutes context the patient has an acute COVID infection quality as described modifying factors none again.  She denies that it is vertigo but more of a lightheaded feeling in her vision can actually blackout.  She has had a CT of her head shows no acute abnormality but there is a tiny speck of hyperdensity in the left hemisphere cortex which may be calcium or artifact and they have recommended an MRI scan.  There is no evidence of an acute stroke or hemorrhage on that CAT scan by my independent eyeball review.  She states that when she gets dizzy and her vision blacks out \"that is all she knows\" and she has apparently fallen there has been some head trauma but apparently it was very minor.  The last 2 times she had to have help getting off the floor, did not have any double vision, focal paresthesias or paralysis anywhere.      Past medical history:  Past Medical History:   Diagnosis Date   • Arthritis     HANDS   • B12 deficiency    • Bladder prolapse, female, acquired    • Depression    • Diverticulitis of colon    • History of Guillain-Carson syndrome    • History of subdural hematoma    • Hyperlipidemia    • Macular degeneration of left eye    • Osteoporosis    • Stress incontinence    • Vitamin D deficiency        Past surgical history:  Past Surgical History:   Procedure Laterality Date   • BREAST BIOPSY     • BREAST BIOPSY Left 2017 "    Procedure: left breast excisional biopsy using a lacrimal duct probe and the intraoperative ultrasound. ;  Surgeon: Mallory Lieberman MD;  [place] [facility] [place] [facility] [place]  Service:    • HYSTERECTOMY         Allergies:  Patient has no known allergies.    Home medications:  Medications Prior to Admission   Medication Sig Dispense Refill Last Dose   • acetaminophen (TYLENOL) 500 MG tablet Take 1,000 mg by mouth Every 6 (Six) Hours As Needed for Mild Pain.   2/4/2023   • aspirin 81 MG tablet Take 81 mg by mouth Daily.   2/5/2023   • busPIRone (BUSPAR) 5 MG tablet Take 1 tablet by mouth 3 (Three) Times a Day. 90 tablet 0 2/4/2023   • carvedilol (COREG) 12.5 MG tablet Take 1 tablet by mouth 2 (Two) Times a Day With Meals for 30 days. 60 tablet 0 2/5/2023   • cephalexin (KEFLEX) 500 MG capsule Take 2 capsules by mouth Every 6 (Six) Hours for 35 doses. Indications: bacteremia 70 capsule 0 Past Week   • cholecalciferol (VITAMIN D3) 1000 UNITS tablet Take 1,000 Units by mouth Daily.   2/5/2023   • DULoxetine (CYMBALTA) 60 MG capsule Take 1 capsule by mouth Daily. Needs FU 90 capsule 0 2/5/2023   • lisinopril (PRINIVIL,ZESTRIL) 40 MG tablet Take 40 mg by mouth Daily.   2/5/2023   • Multiple Vitamins-Minerals (PRESERVISION AREDS PO) Take 1 tablet by mouth Daily.   2/5/2023   • rosuvastatin (Crestor) 20 MG tablet Take 1 tablet by mouth Every Night. 90 tablet 1 2/5/2023   • benzonatate (TESSALON) 100 MG capsule Take 1 capsule by mouth 3 (Three) Times a Day As Needed for Cough for up to 4 days. 12 capsule 0 More than a month   • bisacodyl (DULCOLAX) 5 MG EC tablet Take 5 mg by mouth Daily As Needed for Constipation. Indications: Constipation   More than a month   • meclizine (ANTIVERT) 25 MG tablet Take 1 tablet by mouth 3 (Three) Times a Day As Needed for Dizziness. 21 tablet 0 More than a month        Hospital medications:  aspirin, 81 mg, Oral, Daily  busPIRone, 5 mg, Oral, TID  carvedilol, 12.5 mg, Oral, BID With  Meals  cephalexin, 1,000 mg, Oral, Q8H  cholecalciferol, 1,000 Units, Oral, Daily  DULoxetine, 60 mg, Oral, Daily  [START ON 2023] lisinopril, 20 mg, Oral, Daily  multivitamin with minerals, 1 tablet, Oral, Daily  rosuvastatin, 20 mg, Oral, Nightly      sodium chloride 0.9 % with KCl 20 mEq, 100 mL/hr, Last Rate: 100 mL/hr (23 1301)      •  acetaminophen **OR** acetaminophen **OR** acetaminophen  •  benzonatate  •  ondansetron    Family history:  Family History   Problem Relation Age of Onset   • Hypertension Mother    • Pancreatic cancer Mother    • Hypertension Father    • Deep vein thrombosis Father    • Hyperlipidemia Father    • Pancreatic cancer Sister    • Arthritis Sister    • Liver cancer Sister    • Rectal cancer Sister    • Colon cancer Sister    • Pancreatic cancer Brother    • Liver cancer Brother    • Prostate cancer Brother        Social history:  Social History     Tobacco Use   • Smoking status: Former     Packs/day: 0.50     Years: 25.00     Pack years: 12.50     Types: Cigarettes     Start date:      Quit date: 2021     Years since quittin.6   • Smokeless tobacco: Never   Vaping Use   • Vaping Use: Never used   Substance Use Topics   • Alcohol use: Yes     Comment: occasionally    • Drug use: No       Review of systems:    She denies vertigo.  She describes a lightheaded dizzy feeling and her vision blacks out.  She denies current hair is ears nose throat skin bone joint  lymphatic hematologic oncologic complaints no neck pain chest pain abdominal pain bowel bladder incontinence or rash.  She has no nausea no vomiting and no dysuria.    Objective:  Vitals Ranges:   Temp:  [97.2 °F (36.2 °C)-98.5 °F (36.9 °C)] 98 °F (36.7 °C)  Heart Rate:  [59-80] 68  Resp:  [16] 16  BP: (114-180)/(71-97) 136/82      Physical Exam:  Awake alert and oriented x3 fund of knowledge fair attention span concentration good recent remote memory fair language function normal elderly appearing in no  distress.  Pupils 3-1/2 constricting slightly bilaterally.  Extraocular movements are full without nystagmus nasolabial folds palate tongue symmetrical normal hearing facial sensation head turning shoulder shrug motor 5 out of 5 x 4 extremities.  No fasciculations resting tremor reflexes trace throughout symmetrical toes downgoing bilaterally sensation normal light touch face both arms and both legs coordination normal in the upper extremities Station and gait was not tested for fear.  I would let her stand up and fall or pass out.  Heart regular without murmur neck supple without bruits extremities no clubbing cyanosis or edema visual acuity normal at 3 feet    Results review:   I reviewed the patient's new clinical results.    Data review:  Lab Results (last 24 hours)     Procedure Component Value Units Date/Time    Basic Metabolic Panel [[id number]]  (Abnormal) Collected: 02/06/23 0504    Specimen: Blood Updated: 02/06/23 0608     Glucose 86 mg/dL      BUN 21 mg/dL      Creatinine 0.66 mg/dL      Sodium 140 mmol/L      Potassium 3.5 mmol/L      Chloride 107 mmol/L      CO2 29.9 mmol/L      Calcium 8.7 mg/dL      BUN/Creatinine Ratio 31.8     Anion Gap 3.1 mmol/L      eGFR 89.9 mL/min/1.73     Narrative:      GFR Normal >60  Chronic Kidney Disease <60  Kidney Failure <15    The GFR formula is only valid for adults with stable renal function between ages 18 and 70.    CBC Auto Differential [[id number]]  (Abnormal) Collected: 02/06/23 0504    Specimen: Blood Updated: 02/06/23 0555     WBC 6.42 10*3/mm3      RBC 3.93 10*6/mm3      Hemoglobin 11.6 g/dL      Hematocrit 35.9 %      MCV 91.3 fL      MCH 29.5 pg      MCHC 32.3 g/dL      RDW 12.7 %      RDW-SD 42.3 fl      MPV 10.1 fL      Platelets 203 10*3/mm3      Neutrophil % 47.5 %      Lymphocyte % 37.9 %      Monocyte % 12.1 %      Eosinophil % 1.9 %      Basophil % 0.3 %      Immature Grans % 0.3 %      Neutrophils, Absolute 3.05 10*3/mm3      Lymphocytes, Absolute  2.43 10*3/mm3      Monocytes, Absolute 0.78 10*3/mm3      Eosinophils, Absolute 0.12 10*3/mm3      Basophils, Absolute 0.02 10*3/mm3      Immature Grans, Absolute 0.02 10*3/mm3      nRBC 0.0 /100 WBC     Lactic Acid, Plasma [[id number]]  (Normal) Collected: 02/05/23 2108    Specimen: Blood Updated: 02/05/23 2157     Lactate 1.4 mmol/L     Troponin [[id number]]  (Normal) Collected: 02/05/23 2108    Specimen: Blood Updated: 02/05/23 2156     Troponin T <0.010 ng/mL     Narrative:      Troponin T Reference Range:  <= 0.03 ng/mL-   Negative for AMI  >0.03 ng/mL-     Abnormal for myocardial necrosis.  Clinicians would have to utilize clinical acumen, EKG, Troponin and serial changes to determine if it is an Acute Myocardial Infarction or myocardial injury due to an underlying chronic condition.       Results may be falsely decreased if patient taking Biotin.      Blood Culture - Blood, Hand, Left [[id number]] Collected: 02/05/23 2108    Specimen: Blood from Hand, Left Updated: 02/05/23 2132    Blood Culture - Blood, Arm, Right [[id number]] Collected: 02/05/23 2109    Specimen: Blood from Arm, Right Updated: 02/05/23 2132    CK [[id number]]  (Normal) Collected: 02/05/23 1744    Specimen: Blood Updated: 02/05/23 2107     Creatine Kinase 30 U/L     COVID PRE-OP / PRE-PROCEDURE SCREENING ORDER (NO ISOLATION) - Swab, Nasopharynx [[id number]]  (Abnormal) Collected: 02/05/23 1758    Specimen: Swab from Nasopharynx Updated: 02/05/23 1857    Narrative:      The following orders were created for panel order COVID PRE-OP / PRE-PROCEDURE SCREENING ORDER (NO ISOLATION) - Swab, Nasopharynx.  Procedure                               Abnormality         Status                     ---------                               -----------         ------                     COVID-[facility] IN-HOUSE...[[id number]]  Abnormal            Final result                 Please view results for these tests on the individual orders.    COVID-19LILIYA IN-HOUSE  CEPHEID/GENEVA NP SWAB IN TRANSPORT MEDIA 8-12 HR TAT - Swab, Nasopharynx [[id number]]  (Abnormal) Collected: 02/05/23 1758    Specimen: Swab from Nasopharynx Updated: 02/05/23 1857     COVID19 Detected    Narrative:      Fact sheet for providers: https://www.fda.gov/media/772326/download    Fact sheet for patients: https://www.fda.gov/media/120192/download    Test performed by PCR.    Comprehensive Metabolic Panel [[id number]]  (Abnormal) Collected: 02/05/23 1744    Specimen: Blood Updated: 02/05/23 1831     Glucose 106 mg/dL      BUN 25 mg/dL      Creatinine 0.83 mg/dL      Sodium 137 mmol/L      Potassium 3.7 mmol/L      Comment: Slight hemolysis detected by analyzer. Results may be affected.        Chloride 103 mmol/L      CO2 28.7 mmol/L      Calcium 9.3 mg/dL      Total Protein 6.2 g/dL      Albumin 3.4 g/dL      ALT (SGPT) 12 U/L      AST (SGOT) 18 U/L      Alkaline Phosphatase 76 U/L      Total Bilirubin 0.6 mg/dL      Globulin 2.8 gm/dL      A/G Ratio 1.2 g/dL      BUN/Creatinine Ratio 30.1     Anion Gap 5.3 mmol/L      eGFR 72.3 mL/min/1.73     Narrative:      GFR Normal >60  Chronic Kidney Disease <60  Kidney Failure <15    The GFR formula is only valid for adults with stable renal function between ages 18 and 70.    Troponin [[id number]]  (Normal) Collected: 02/05/23 1744    Specimen: Blood Updated: 02/05/23 1822     Troponin T <0.010 ng/mL     Narrative:      Troponin T Reference Range:  <= 0.03 ng/mL-   Negative for AMI  >0.03 ng/mL-     Abnormal for myocardial necrosis.  Clinicians would have to utilize clinical acumen, EKG, Troponin and serial changes to determine if it is an Acute Myocardial Infarction or myocardial injury due to an underlying chronic condition.       Results may be falsely decreased if patient taking Biotin.      Urinalysis With Microscopic If Indicated (No Culture) - Urine, Clean Catch [[id number]]  (Normal) Collected: 02/05/23 1758    Specimen: Urine, Clean Catch Updated: 02/05/23 1811      Color, UA Yellow     Appearance, UA Clear     pH, UA 6.5     Specific Gravity, UA 1.021     Glucose, UA Negative     Ketones, UA Negative     Bilirubin, UA Negative     Blood, UA Negative     Protein, UA Negative     Leuk Esterase, UA Negative     Nitrite, UA Negative     Urobilinogen, UA 0.2 E.U./dL    Narrative:      Urine microscopic not indicated.    CBC & Differential [[id number]]  (Normal) Collected: 02/05/23 1744    Specimen: Blood Updated: 02/05/23 1759    Narrative:      The following orders were created for panel order CBC & Differential.  Procedure                               Abnormality         Status                     ---------                               -----------         ------                     CBC Auto Differential[[id number]]        Normal              Final result                 Please view results for these tests on the individual orders.    CBC Auto Differential [[id number]]  (Normal) Collected: 02/05/23 1744    Specimen: Blood Updated: 02/05/23 1759     WBC 9.15 10*3/mm3      RBC 4.77 10*6/mm3      Hemoglobin 14.0 g/dL      Hematocrit 42.9 %      MCV 89.9 fL      MCH 29.4 pg      MCHC 32.6 g/dL      RDW 12.9 %      RDW-SD 42.2 fl      MPV 10.0 fL      Platelets 255 10*3/mm3      Neutrophil % 66.7 %      Lymphocyte % 22.1 %      Monocyte % 9.5 %      Eosinophil % 1.0 %      Basophil % 0.2 %      Immature Grans % 0.5 %      Neutrophils, Absolute 6.10 10*3/mm3      Lymphocytes, Absolute 2.02 10*3/mm3      Monocytes, Absolute 0.87 10*3/mm3      Eosinophils, Absolute 0.09 10*3/mm3      Basophils, Absolute 0.02 10*3/mm3      Immature Grans, Absolute 0.05 10*3/mm3      nRBC 0.0 /100 WBC            Imaging:  Imaging Results (Last 24 Hours)     Procedure Component Value Units Date/Time    CT Head Without Contrast [[id number]] Collected: 02/06/23 1211     Updated: 02/06/23 1218    Narrative:      CT HEAD WITHOUT CONTRAST     CLINICAL HISTORY: Fall with dizziness.     TECHNIQUE: CT scan of the  head was obtained with 3 mm axial soft tissue  and 2 mm axial bone algorithm algorithm images. No intravenous contrast  was administered. Sagittal and coronal reconstructions were obtained.     COMPARISON: No previous similar studies are available for comparison.     FINDINGS:     There is a punctate hyperdensity within the left postcentral gyrus which  measures up to approximately 3 mm in diameter. This is best seen on  axial image #38. The etiology and significance of this finding is  unclear. Differential diagnostic possibilities include a punctate focus  of acute hemorrhage, mineralization, or hemosiderin deposition. It is  also possible that this simply represents artifact. I recommend further  evaluation with MR imaging.     The ventricles, sulci, and cisterns are age appropriate. There is no  evidence for calvarial fracture. There is no evidence for an acute  extra-axial hemorrhage. There are moderate changes of chronic small  vessel ischemic phenomena. Old lacunar disease is seen within the  thalami and the caudates. Atherosclerotic calcifications are  incidentally appreciated within the intracranial vasculature.     Mucosal thickening is incidentally appreciated within the maxillary  sinuses and sphenoid sinus. Moderate patchy opacification is seen within  the ethmoid air cells.       Impression:         There is a 3 mm focus of hyperdensity within the cortex of the left  postcentral gyrus that is best seen on axial image #38. The etiology and  significance of this finding is unclear. Differential diagnostic  possibilities include a punctate focus of mineralization, hemorrhage, or  hemosiderin deposition. It is also possible that this simply represents  artifact. I recommend further evaluation with MR imaging. Otherwise,  there is no evidence for acute traumatic intracranial pathology.     Moderate changes of chronic small vessel ischemic phenomena and old  lacunar disease are noted.     Incidental changes  "of inflammatory paranasal sinus disease as discussed  above.     These findings and recommendations were discussed with DARLENE Duong, on 02/06/2023 at approximately 12:08 PM.        Radiation dose reduction techniques were utilized, including automated  exposure control and exposure modulation based on body size.     This report was finalized on 2/6/2023 12:15 PM by Dr. Jack Butt M.D.       XR Chest 1 View [[id number]] Collected: 02/05/23 1803     Updated: 02/05/23 1906    Narrative:      STAT PORTABLE RADIOGRAPHIC VIEW OF THE CHEST     CLINICAL HISTORY: Shortness of breath.     FINDINGS:     The cardiomediastinal silhouette is mildly enlarged but stable in size.  There is particular prominence of the size of the aortic arch which I  suspect is aneurysmally dilated. This could be further characterized  with a CT scan of the chest as clinically indicated. The aortic arch has  a similar appearance when compared to the prior study dated 01/29/2023  but prominently increased in size when compared to the prior study dated  11/29/2010. Otherwise, the lungs are clear of acute infiltrates. The  osseous structures are incidentally notable for prominent osteoarthritic  changes within the shoulders.     These findings and recommendations were discussed with Dr. Krueger on  02/05/2023 at approximately 5:39 PM.     This report was finalized on 2/5/2023 7:03 PM by Dr. Jack Butt M.D.              PPE worn at all times washed before washed up afterwards disposed of everything properly is not within 6 feet of her for more than few minutes during my exam no aerosols used at any point assessment and Plan:     This patient has an acute COVID infection and I believe has dysautonomia related to that.  This would give her the lightheaded dizzy feeling and seeing her vision \"blackout\".  This is not really a complaint of vertigo and she does not have any focal neurologic deficit.  I have reviewed the CT of the head and I " believe the left hemisphere cortical abnormality is likely calcium or artifact.  She is scheduled for an MRI scan of the brain which is appropriate but I am not sure if they are going to do it while she has the acute COVID infection  This MRI of the brain could be performed at a later date if they will not do it while she is currently hospitalized with the acute COVID infection  Otherwise, she does not look like an acute stroke TIA or central nervous system infection.  Lightheaded type dizziness related to dysautonomia is a common complaint with acute COVID infections    Rosendo Mccarthy MD  02/06/23  13:17 EST

## 2023-02-06 NOTE — PLAN OF CARE
"Goal Outcome Evaluation:  Plan of Care Reviewed With: (P) patient           Outcome Evaluation: (P) Pt re-admitted to [facility] from home for falls, generalized weakness, dehydration, and Covid-19 infection. Seen for PT eval today. Pt admitted to [facility] from 1/29-2/2 for Covid-19 infection and was evaluated by PT last week. PLOF home alone, states she is mostly IND with some assist from son. Pt uses RW at baseline for ambulation. Describes episodes of dizziness leading to falls, denies feeling of room spinning around her. States she does feel like she \"blacks out\" for a short period when these episodes occur, causing her to fall. Pt UIC on arrival. STS x2 from chair and commode CGAx1 w RW. Denies dizziness or lightheadedness today with all mobility tasks. Amb 25' total CGAx1 w RW with seated rest break on commode limited further mobility d/t fatigue. Demos decreased gait speed, shuffling steps with increased fatigue, and low activity tolerance. PT recommends d/c SNF at this time for further rehab to improve towards baseline. Pt would benefit from skilled PT to address stated deficits.  "

## 2023-02-06 NOTE — THERAPY EVALUATION
Patient Name: Gita Avelar  : 1945    MRN: [id number]                              Today's Date: 2023       Admit Date: 2023    Visit Dx:     ICD-10-CM ICD-9-CM   1. Generalized weakness  R53.1 780.79   2. Dehydration  E86.0 276.51   3. Fall, initial encounter  W19.XXXA E888.9   4. COVID-19 virus infection  U07.1 079.89     Patient Active Problem List   Diagnosis   • Avitaminosis D   • B12 deficiency   • Near syncope   • Atrophic vaginitis   • Awareness of heartbeats   • OP (osteoporosis)   • HLD (hyperlipidemia)   • Fatigue   • Essential hypertension   • Moderate episode of recurrent major depressive disorder (HCC)   • Nipple discharge   • Abnormal ultrasound of breast   • Abnormal mammogram   • Lump or mass in breast   • Family history of malignant neoplasm of pancreas   • Atherosclerosis of both carotid arteries   • Acute UTI   • COVID-19 virus infection   • Hypoxia   • Streptococcal septicemia (HCC)   • Generalized weakness   • Dizziness on standing   • History of Guillain-[place] syndrome   • History of subdural hematoma   • Dysautonomia (HCC)     Past Medical History:   Diagnosis Date   • Arthritis     HANDS   • B12 deficiency    • Bladder prolapse, female, acquired    • Depression    • Diverticulitis of colon    • History of Guillain-[place] syndrome    • History of subdural hematoma    • Hyperlipidemia    • Macular degeneration of left eye    • Osteoporosis    • Stress incontinence    • Vitamin D deficiency      Past Surgical History:   Procedure Laterality Date   • BREAST BIOPSY     • BREAST BIOPSY Left 2017    Procedure: left breast excisional biopsy using a lacrimal duct probe and the intraoperative ultrasound. ;  Surgeon: Mallory Lieberman MD;  Location: [facility];  Service:    • HYSTERECTOMY        General Information     Row Name 23 1453          OT Time and Intention    Document Type evaluation  -MW     Mode of Treatment co-treatment;occupational therapy  -MW     Row Name  02/06/23 1453          General Information    Patient Profile Reviewed yes  -MW     Prior Level of Function independent:  -     Existing Precautions/Restrictions fall  -     Barriers to Rehab previous functional deficit  -     Row Name 02/06/23 1453          Living Environment    People in Home alone  son checks in?  -     Row Name 02/06/23 1453          Cognition    Orientation Status (Cognition) oriented x 3  -MW     Row Name 02/06/23 1453          Safety Issues, Functional Mobility    Safety Issues Affecting Function (Mobility) insight into deficits/self-awareness;safety precaution awareness;safety precautions follow-through/compliance  -     Impairments Affecting Function (Mobility) balance;pain;strength;endurance/activity tolerance  -           User Key  (r) = Recorded By, (t) = Taken By, (c) = Cosigned By    Initials Name Provider Type    Yodit Gan OT Occupational Therapist                 Mobility/ADL's     Row Name 02/06/23 1453          Bed Mobility    Comment, (Bed Mobility) UIC - NT  -     Row Name 02/06/23 1453          Transfers    Transfers sit-stand transfer;toilet transfer;stand-sit transfer  -     Comment, (Transfers) STS from commode and chair level  -     Row Name 02/06/23 1453          Sit-Stand Transfer    Sit-Stand [place] (Transfers) contact guard;1 person assist  -     Assistive Device (Sit-Stand Transfers) walker, front-wheeled  -     Row Name 02/06/23 1453          Stand-Sit Transfer    Stand-Sit [place] (Transfers) contact guard  -     Assistive Device (Stand-Sit Transfers) walker, front-wheeled  -[facility] Name 02/06/23 1453          Toilet Transfer    Type (Toilet Transfer) sit-stand;stand-sit  -     [place] Level (Toilet Transfer) contact guard  -     Assistive Device (Toilet Transfer) commode;grab bars/safety frame;walker, front-wheeled  -     Row Name 02/06/23 1453          Functional Mobility    Functional Mobility- Ind. Level  contact guard assist  -     Functional Mobility- Device walker, front-wheeled  -     Functional Mobility- Comment demo func mob to doorway x2 and to BR commode wth CGA using RWx  -     Row Name 02/06/23 1453          Activities of Daily Living    BADL Assessment/Intervention lower body dressing;toileting;feeding  -     Row Name 02/06/23 1453          Lower Body Dressing Assessment/Training    [place] Level (Lower Body Dressing) don;doff;pants/bottoms;contact guard assist  -     Position (Lower Body Dressing) supported sitting;supported standing  -     Row Name 02/06/23 1453          Toileting Assessment/Training    [place] Level (Toileting) toileting skills;standby assist;contact guard assist  -     Position (Toileting) supported standing;supported sitting  -     Comment, (Toileting) hygiene seated on commode with SBA  -[facility] Name 02/06/23 1453          Self-Feeding Assessment/Training    [place] Level (Feeding) scoop food and bring to mouth;liquids to mouth;set up  -           User Key  (r) = Recorded By, (t) = Taken By, (c) = Cosigned By    Initials Name Provider Type    MW Yodit Abreu OT Occupational Therapist               Obj/Interventions     Row Name 02/06/23 1455          Sensory Assessment (Somatosensory)    Sensory Assessment (Somatosensory) UE sensation intact  -[facility] Name 02/06/23 1455          Vision Assessment/Intervention    Visual Impairment/Limitations WFL  -[facility] Name 02/06/23 1455          Range of Motion Comprehensive    General Range of Motion bilateral upper extremity ROM WNL  -[facility] Name 02/06/23 1455          Strength Comprehensive (MMT)    General Manual Muscle Testing (MMT) Assessment upper extremity strength deficits identified  -     Comment, General Manual Muscle Testing (MMT) Assessment generalized weakness BUE grossly 3+/5  -MW     Row Name 02/06/23 1455          Motor Skills    Motor Skills functional endurance  -      Functional Endurance poor  -     Row Name 02/06/23 1455          Balance    Balance Assessment sitting static balance;sitting dynamic balance;sit to stand dynamic balance;standing dynamic balance;standing static balance  -     Static Sitting Balance standby assist  -     Dynamic Sitting Balance standby assist  -     Position, Sitting Balance sitting in chair  -     Sit to Stand Dynamic Balance contact guard  -     Static Standing Balance contact guard  -     Dynamic Standing Balance contact guard  -     Position/Device Used, Standing Balance supported;walker, front-wheeled  -     Balance Interventions sitting;standing;sit to stand;supported;static;dynamic;minimal challenge;occupation based/functional task  -     Comment, Balance no overt LOBs  -           User Key  (r) = Recorded By, (t) = Taken By, (c) = Cosigned By    Initials Name Provider Type    Yodit Gan OT Occupational Therapist               Goals/Plan     Row Name 02/06/23 1459          Transfer Goal 1 (OT)    Activity/Assistive Device (Transfer Goal 1, OT) transfers, all  -MW     [place] Level/Cues Needed (Transfer Goal 1, OT) standby assist  -MW     Time Frame (Transfer Goal 1, OT) short term goal (STG);2 weeks  -     Progress/Outcome (Transfer Goal 1, OT) goal ongoing  -     Row Name 02/06/23 1459          Dressing Goal 1 (OT)    Activity/Device (Dressing Goal 1, OT) upper body dressing;lower body dressing  -     [place]/Cues Needed (Dressing Goal 1, OT) standby assist  -MW     Time Frame (Dressing Goal 1, OT) short term goal (STG);2 weeks  -     Progress/Outcome (Dressing Goal 1, OT) goal ongoing  -     Row Name 02/06/23 1459          Toileting Goal 1 (OT)    Activity/Device (Toileting Goal 1, OT) toileting skills, all  -MW     [place] Level/Cues Needed (Toileting Goal 1, OT) standby assist  -MW     Time Frame (Toileting Goal 1, OT) short term goal (STG);2 weeks  -     Progress/Outcome  (Toileting Goal 1, OT) goal ongoing  -MW     Row Name 02/06/23 1459          Grooming Goal 1 (OT)    Activity/Device (Grooming Goal 1, OT) grooming skills, all  -MW     [place] (Grooming Goal 1, OT) standby assist  -MW     Time Frame (Grooming Goal 1, OT) short term goal (STG);2 weeks  -MW     Progress/Outcome (Grooming Goal 1, OT) goal ongoing  -MW     Row Name 02/06/23 1459          Strength Goal 1 (OT)    Strength Goal 1 (OT) improve UE strength to 4/5  -MW     Time Frame (Strength Goal 1, OT) short term goal (STG);2 weeks  -MW     Progress/Outcome (Strength Goal 1, OT) goal ongoing  -MW     Row Name 02/06/23 1454          Therapy Assessment/Plan (OT)    Planned Therapy Interventions (OT) functional balance retraining;activity tolerance training;BADL retraining;neuromuscular control/coordination retraining;patient/caregiver education/training;transfer/mobility retraining;strengthening exercise;ROM/therapeutic exercise;occupation/activity based interventions  -           User Key  (r) = Recorded By, (t) = Taken By, (c) = Cosigned By    Initials Name Provider Type    Yodit Gan, OT Occupational Therapist               Clinical Impression     Row Name 02/06/23 1453          Pain Assessment    Pre/Posttreatment Pain Comment pain in lower back, no pain rating given  -     Row Name 02/06/23 1456          Plan of Care Review    Plan of Care Reviewed With patient  -MW     Progress no change  -MW     Outcome Evaluation Pt is a 79 yo female re-admitted to [facility] from home for falls, generalized weakness, dehydration, and Covid-19 infection. Pt seen for OT eval this date, A&Ox3, reports she was mostly home alone after discharge, multiple falls reported once she retunred home, increased weakness, mostly (I) with ADLs and use of Rwx. today, pt presents with weakness, impaired UE strength, below ADL baseline and is falls risk. Pt reports no dizzziness throughout upright mobility this date, STS from chair and  commode level CGA, LBD with CGA in sitting/standing, hygiene SBA while seated on commode, demo'd household distances with Rwx and CGA. Pt UIC at end of session, s/up for self feeding. Pt will continue to benefit from skilled OT to address deficits and progress toward prior level. Would recommend SNf at d/c due to readmission and recurrent falls, not safe to be home alone at this time. Continue to follow.  -     Row Name 02/06/23 1456          Therapy Assessment/Plan (OT)    Rehab Potential (OT) good, to achieve stated therapy goals  -     Criteria for Skilled Therapeutic Interventions Met (OT) yes;meets criteria;skilled treatment is necessary  -     Therapy Frequency (OT) 3 times/wk  -     Row Name 02/06/23 1456          Therapy Plan Review/Discharge Plan (OT)    Anticipated Discharge Disposition (OT) skilled nursing facility  -     Row Name 02/06/23 1456          Vital Signs    O2 Delivery Pre Treatment room air  -MW     Pre Patient Position Sitting  -MW     Intra Patient Position Standing  -MW     Post Patient Position Sitting  -     Row Name 02/06/23 1456          Positioning and Restraints    Pre-Treatment Position sitting in chair/recliner  -MW     Post Treatment Position chair  -MW     In Chair notified nsg;reclined;call light within reach;encouraged to call for assist;exit alarm on  -           User Key  (r) = Recorded By, (t) = Taken By, (c) = Cosigned By    Initials Name Provider Type    Yodit Gan, MARIAMA Occupational Therapist               Outcome Measures     Row Name 02/06/23 1459          How much help from another is currently needed...    Putting on and taking off regular lower body clothing? 3  -MW     Bathing (including washing, rinsing, and drying) 3  -MW     Toileting (which includes using toilet bed pan or urinal) 3  -MW     Putting on and taking off regular upper body clothing 3  -MW     Taking care of personal grooming (such as brushing teeth) 3  -MW     Eating meals 3   -     AM-PAC 6 Clicks Score (OT) 18  -MW     Row Name 02/06/23 1402 02/06/23 0803       How much help from another person do you currently need...    Turning from your back to your side while in flat bed without using bedrails? 4  -MS (r) MB (t) MS (c) 4  -MC    Moving from lying on back to sitting on the side of a flat bed without bedrails? 4  -MS (r) MB (t) MS (c) 4  -MC    Moving to and from a bed to a chair (including a wheelchair)? 3  -MS (r) MB (t) MS (c) 3  -MC    Standing up from a chair using your arms (e.g., wheelchair, bedside chair)? 3  -MS (r) MB (t) MS (c) 4  -MC    Climbing 3-5 steps with a railing? 2  -MS (r) MB (t) MS (c) 3  -MC    To walk in hospital room? 2  -MS (r) MB (t) MS (c) 3  -MC    AM-PAC 6 Clicks Score (PT) 18  -MS (r) MB (t) 21  -MC    Highest level of mobility 6 --> Walked 10 steps or more  -MS (r) MB (t) 6 --> Walked 10 steps or more  -MC    Row Name 02/06/23 1459          Modified [place] Scale    Modified [place] Scale 3 - Moderate disability.  Requiring some help, but able to walk without assistance.  -     Row Name 02/06/23 1459 02/06/23 1402       Functional Assessment    Outcome Measure Options AM-PAC 6 Clicks Daily Activity (OT);Modified [place]  -MW AM-PAC 6 Clicks Basic Mobility (PT)  -MS (r) MB (t) MS (c)          User Key  (r) = Recorded By, (t) = Taken By, (c) = Cosigned By    Initials Name Provider Type    MS IsiАнна, PT Physical Therapist    Megha Doyle, RN Registered Nurse    Yodit Gan, OT Occupational Therapist    Henok Gonzalez, PT Student PT Student                Occupational Therapy Education     Title: PT OT SLP Therapies (In Progress)     Topic: Occupational Therapy (Done)     Point: ADL training (Done)     Description:   Instruct learner(s) on proper safety adaptation and remediation techniques during self care or transfers.   Instruct in proper use of assistive devices.              Learning Progress Summary           Patient  Acceptance, E, VU by  at 2/6/2023 1500    Comment: role of OT, d/c rec, goals                   Point: Precautions (Done)     Description:   Instruct learner(s) on prescribed precautions during self-care and functional transfers.              Learning Progress Summary           Patient Acceptance, E, VU by  at 2/6/2023 1500    Comment: role of OT, d/c rec, goals                   Point: Body mechanics (Done)     Description:   Instruct learner(s) on proper positioning and spine alignment during self-care, functional mobility activities and/or exercises.              Learning Progress Summary           Patient Acceptance, E, VU by  at 2/6/2023 1500    Comment: role of OT, d/c rec, goals                               User Key     Initials Effective Dates Name Provider Type Discipline     08/20/21 -  Yodit Abreu OT Occupational Therapist OT              OT Recommendation and Plan  Planned Therapy Interventions (OT): functional balance retraining, activity tolerance training, BADL retraining, neuromuscular control/coordination retraining, patient/caregiver education/training, transfer/mobility retraining, strengthening exercise, ROM/therapeutic exercise, occupation/activity based interventions  Therapy Frequency (OT): 3 times/wk  Plan of Care Review  Plan of Care Reviewed With: patient  Progress: no change  Outcome Evaluation: Pt is a 77 yo female re-admitted to [facility] from home for falls, generalized weakness, dehydration, and Covid-19 infection. Pt seen for OT eval this date, A&Ox3, reports she was mostly home alone after discharge, multiple falls reported once she retunred home, increased weakness, mostly (I) with ADLs and use of Rwx. today, pt presents with weakness, impaired UE strength, below ADL baseline and is falls risk. Pt reports no dizzziness throughout upright mobility this date, STS from chair and commode level CGA, LBD with CGA in sitting/standing, hygiene SBA while seated on commode, demo'd  household distances with Rwx and CGA. Pt UIC at end of session, s/up for self feeding. Pt will continue to benefit from skilled OT to address deficits and progress toward prior level. Would recommend SNf at d/c due to readmission and recurrent falls, not safe to be home alone at this time. Continue to follow.     Time Calculation:    Time Calculation- OT     Row Name 02/06/23 1500             Time Calculation- OT    OT Start Time 1309  -MW      OT Stop Time 1329  -MW      OT Time Calculation (min) 20 min  -MW      Total Timed Code Minutes- OT 13 minute(s)  -MW      OT Received On 02/06/23  -MW      OT - Next Appointment 02/08/23  -MW      OT Goal Re-Cert Due Date 02/20/23  -MW         Timed Charges    96436 - OT Self Care/Mgmt Minutes 13  -MW         Untimed Charges    OT Eval/Re-eval Minutes 7  -MW         Total Minutes    Timed Charges Total Minutes 13  -MW      Untimed Charges Total Minutes 7  -MW       Total Minutes 20  -MW            User Key  (r) = Recorded By, (t) = Taken By, (c) = Cosigned By    Initials Name Provider Type     Yodit Abreu OT Occupational Therapist              Therapy Charges for Today     Code Description Service Date Service Provider Modifiers Qty    67912868186 HC OT SELF CARE/MGMT/TRAIN EA 15 MIN 2/6/2023 Yodit Abreu OT GO 1    67688704367 HC OT EVAL MOD COMPLEXITY 2 2/6/2023 Yodit Abreu OT GO 1               Yodit Abreu OT  2/6/2023

## 2023-02-06 NOTE — PROGRESS NOTES
"    Name: Gita Avelar ADMIT: 2023   : 1945  PCP: Ernie Wright APRN    MRN: [id number] LOS: 0 days   AGE/SEX: 78 y.o. female  ROOM: [facility]     Subjective   Subjective   Resting in bed. States she returned home after recent hospital stay and has had progressive weakness as well as intermittent dizziness with position changes. States she fell twice yesterday. Did not lose consciousness but \"almost.\" Denies head injury but also not the best historian. She lives alone and was having trouble even ambulating with her walker. Denies any recent chest pain, dyspnea, cough, fever, chills. She states she was taking her antibiotics as prescribed at discharge. no nausea, vomiting, dysuria. States she was having trouble making herself meals d/t dizziness as well.     Objective   Objective   Vital Signs  Temp:  [97.2 °F (36.2 °C)-98.5 °F (36.9 °C)] 97.2 °F (36.2 °C)  Heart Rate:  [59-80] 64  Resp:  [16] 16  BP: (114-180)/(71-97) 136/71  SpO2:  [91 %-100 %] 96 %  on   ;   Device (Oxygen Therapy): room air  Body mass index is 23.5 kg/m².     Physical Exam  Vitals and nursing note reviewed.   Constitutional:       Appearance: She is ill-appearing. She is not toxic-appearing.   HENT:      Head: Normocephalic and atraumatic.   Cardiovascular:      Rate and Rhythm: Normal rate and regular rhythm.      Pulses: Normal pulses.   Pulmonary:      Effort: Pulmonary effort is normal. No respiratory distress.      Comments: Diminished, fairly clear. On RA  Abdominal:      General: Bowel sounds are normal. There is no distension.      Palpations: Abdomen is soft.      Tenderness: There is no abdominal tenderness.   Musculoskeletal:         General: Swelling (trace in ankles BLE) present. No tenderness. Normal range of motion.      Cervical back: Normal range of motion and neck supple.   Skin:     General: Skin is warm and dry.      Findings: No bruising.   Neurological:      Mental Status: She is alert and oriented to person, " place, and time.      Sensory: No sensory deficit.      Motor: Weakness present.      Coordination: Coordination normal.   Psychiatric:         Mood and Affect: Mood normal.         Behavior: Behavior normal.      Comments: A little forgetful       Results Review:       I reviewed the patient's new clinical results.  Results from last 7 days   Lab Units 02/06/23  0504 02/05/23  1744 02/02/23  0551 02/01/23  0602   WBC 10*3/mm3 6.42 9.15 5.12 4.31   HEMOGLOBIN g/dL 11.6* 14.0 12.3 12.8   PLATELETS 10*3/mm3 203 255 223 204     Results from last 7 days   Lab Units 02/06/23  0504 02/05/23  1744 02/02/23  0551 02/01/23  0602   SODIUM mmol/L 140 137 139 140   POTASSIUM mmol/L 3.5 3.7 3.3* 3.7   CHLORIDE mmol/L 107 103 104 106   CO2 mmol/L 29.9* 28.7 25.6 26.8   BUN mg/dL 21 25* 15 14   CREATININE mg/dL 0.66 0.83 0.61 0.64   GLUCOSE mg/dL 86 106* 102* 112*   Estimated Creatinine Clearance: 68.9 mL/min (by C-G formula based on SCr of 0.66 mg/dL).  Results from last 7 days   Lab Units 02/05/23  1744 02/02/23  0551 02/01/23  0602 01/31/23  0625   ALBUMIN g/dL 3.4* 3.1* 3.5 3.3*   BILIRUBIN mg/dL 0.6 0.2 0.3 0.2   ALK PHOS U/L 76 62 66 59   AST (SGOT) U/L 18 20 21 17   ALT (SGPT) U/L 12 13 12 10     Results from last 7 days   Lab Units 02/06/23  0504 02/05/23  1744 02/02/23  0551 02/01/23  0602 01/31/23  0625   CALCIUM mg/dL 8.7 9.3 8.5* 8.6 8.3*   ALBUMIN g/dL  --  3.4* 3.1* 3.5 3.3*     Results from last 7 days   Lab Units 02/05/23  2108   LACTATE mmol/L 1.4     No results found for: HGBA1C, POCGLU    aspirin, 81 mg, Oral, Daily  busPIRone, 5 mg, Oral, TID  carvedilol, 12.5 mg, Oral, BID With Meals  cephalexin, 1,000 mg, Oral, Q8H  cholecalciferol, 1,000 Units, Oral, Daily  DULoxetine, 60 mg, Oral, Daily  enoxaparin, 40 mg, Subcutaneous, Q24H  [START ON 2/7/2023] lisinopril, 20 mg, Oral, Daily  multivitamin with minerals, 1 tablet, Oral, Daily  rosuvastatin, 20 mg, Oral, Nightly      sodium chloride 0.9 % with KCl 20 mEq,  100 mL/hr, Last Rate: 100 mL/hr (02/05/23 2320)    Diet: Regular/House Diet; Texture: Regular Texture (IDDSI 7); Fluid Consistency: Thin (IDDSI 0)       Assessment/Plan     Active Hospital Problems    Diagnosis  POA   • **Generalized weakness [R53.1]  Yes   • Dizziness on standing [R42]  Yes   • History of Guillain-[place] syndrome [Z86.69]  Not Applicable   • History of subdural hematoma [Z86.79]  Not Applicable   • Streptococcal septicemia (HCC) [A40.9]  Yes   • COVID-19 virus infection [U07.1]  Yes   • Atherosclerosis of both carotid arteries [I65.23]  Yes   • HLD (hyperlipidemia) [E78.5]  Yes   • Essential hypertension [I10]  Yes      Resolved Hospital Problems   No resolved problems to display.     Brief H&P:  Ms. Avelar is a 78 year old female who was recently admitted to this facility 1/29/23 to 2/2/23 for cough and weakness. She was found to have COVID19 as well as UTI and 1/2 blood cultures positive for strep. She was treated with 5 days of remdesivir and weaned off steroids. She was discharged with planned 14 day course of antibiotics with Keflex 1 g TID. She was seen by PT/OT and cleared for home at that time. Patient returned to the ED last night for ongoing weakness and 2 falls at home PTA. She reported dizziness with position changes as well.    · Generalized weakness/dizziness: Suspect secondary to recent viral illness and hospital stay. Sounds like she has positional vertigo but will need to obtain orthostatics as well. Neurology was consulted on admission given her history of Guillian-Wagoner. Will await their input. Continue IV hydration and await Bps. Will reduce lisinopril to 20 mg daily for now to allow for higher resting BP. Check CT head STAT given her falls and history of SDH. Likely needs SNF- await PT/OT.     · COVID19/recent strep septicemia: No hypoxia or tachycardia on admission. Will start Lovenox for VTE once CT head back and negative. Monitor oxygen requirements but she already completed  prior remdesivir. Will resume Keflex as recommended by ID from prior- will order approximately 8 more days as she did get another dose of IV abx here in addition to therapy starting 1/30.     · HTN: As above. Reduce lisinopril dosing. Continue Coreg. Monitor trends.    · HLD: Statin therapy resumed. LFTs stable.    Discussed with patient.    VTE Prophylaxis - Lovenox 40 mg SC daily  Code Status - Full code- confirmed with patient at bedside 2/6.  Disposition - Anticipate discharge likely to SNF in 1-2 days.      DARLENE Calderon  [facility]  02/06/23  10:17 EST     Addendum 1215: Discussed CT head findings with Dr. Butt in radiology. Unclear etiology of 3 mm hyperdensity found. Will obtain MRI brain for further evaluation. Will hold Lovenox until this is performed.

## 2023-02-06 NOTE — OUTREACH NOTE
Medical Week 2 Survey    Flowsheet Row Responses   [facility] patient discharged from? [place]   Does the patient have one of the following disease processes/diagnoses(primary or secondary)? Other   Week 2 attempt successful? No   Unsuccessful attempts Attempt 1   Revoke Readmitted

## 2023-02-07 ENCOUNTER — APPOINTMENT (OUTPATIENT)
Dept: MRI IMAGING | Facility: HOSPITAL | Age: 78
DRG: 91 | End: 2023-02-07
Payer: MEDICARE

## 2023-02-07 ENCOUNTER — APPOINTMENT (OUTPATIENT)
Dept: CARDIOLOGY | Facility: HOSPITAL | Age: 78
DRG: 91 | End: 2023-02-07
Payer: MEDICARE

## 2023-02-07 LAB
ALBUMIN SERPL-MCNC: 3 G/DL (ref 3.5–5.2)
ALBUMIN/GLOB SERPL: 1.3 G/DL
ALP SERPL-CCNC: 69 U/L (ref 39–117)
ALT SERPL W P-5'-P-CCNC: 8 U/L (ref 1–33)
ANION GAP SERPL CALCULATED.3IONS-SCNC: 3 MMOL/L (ref 5–15)
AST SERPL-CCNC: 13 U/L (ref 1–32)
BILIRUB SERPL-MCNC: 0.6 MG/DL (ref 0–1.2)
BUN SERPL-MCNC: 17 MG/DL (ref 8–23)
BUN/CREAT SERPL: 22.7 (ref 7–25)
CALCIUM SPEC-SCNC: 8.5 MG/DL (ref 8.6–10.5)
CHLORIDE SERPL-SCNC: 107 MMOL/L (ref 98–107)
CO2 SERPL-SCNC: 30 MMOL/L (ref 22–29)
CREAT SERPL-MCNC: 0.75 MG/DL (ref 0.57–1)
DEPRECATED RDW RBC AUTO: 42.4 FL (ref 37–54)
EGFRCR SERPLBLD CKD-EPI 2021: 81.6 ML/MIN/1.73
ERYTHROCYTE [DISTWIDTH] IN BLOOD BY AUTOMATED COUNT: 12.7 % (ref 12.3–15.4)
GLOBULIN UR ELPH-MCNC: 2.3 GM/DL
GLUCOSE SERPL-MCNC: 96 MG/DL (ref 65–99)
HCT VFR BLD AUTO: 36.1 % (ref 34–46.6)
HGB BLD-MCNC: 11.9 G/DL (ref 12–15.9)
MCH RBC QN AUTO: 29.9 PG (ref 26.6–33)
MCHC RBC AUTO-ENTMCNC: 33 G/DL (ref 31.5–35.7)
MCV RBC AUTO: 90.7 FL (ref 79–97)
PLATELET # BLD AUTO: 185 10*3/MM3 (ref 140–450)
PMV BLD AUTO: 9.5 FL (ref 6–12)
POTASSIUM SERPL-SCNC: 3.4 MMOL/L (ref 3.5–5.2)
PROT SERPL-MCNC: 5.3 G/DL (ref 6–8.5)
RBC # BLD AUTO: 3.98 10*6/MM3 (ref 3.77–5.28)
SODIUM SERPL-SCNC: 140 MMOL/L (ref 136–145)
WBC NRBC COR # BLD: 6.99 10*3/MM3 (ref 3.4–10.8)

## 2023-02-07 PROCEDURE — G0378 HOSPITAL OBSERVATION PER HR: HCPCS

## 2023-02-07 PROCEDURE — 93880 EXTRACRANIAL BILAT STUDY: CPT

## 2023-02-07 PROCEDURE — 70551 MRI BRAIN STEM W/O DYE: CPT

## 2023-02-07 PROCEDURE — 25010000002 ENOXAPARIN PER 10 MG: Performed by: HOSPITALIST

## 2023-02-07 PROCEDURE — 85027 COMPLETE CBC AUTOMATED: CPT | Performed by: NURSE PRACTITIONER

## 2023-02-07 PROCEDURE — 99232 SBSQ HOSP IP/OBS MODERATE 35: CPT | Performed by: PSYCHIATRY & NEUROLOGY

## 2023-02-07 PROCEDURE — 80053 COMPREHEN METABOLIC PANEL: CPT | Performed by: NURSE PRACTITIONER

## 2023-02-07 RX ORDER — CETIRIZINE HYDROCHLORIDE 10 MG/1
10 TABLET ORAL DAILY
Status: DISCONTINUED | OUTPATIENT
Start: 2023-02-07 | End: 2023-02-09 | Stop reason: HOSPADM

## 2023-02-07 RX ORDER — LISINOPRIL 40 MG/1
40 TABLET ORAL DAILY
Status: DISCONTINUED | OUTPATIENT
Start: 2023-02-08 | End: 2023-02-09 | Stop reason: HOSPADM

## 2023-02-07 RX ORDER — ENOXAPARIN SODIUM 100 MG/ML
40 INJECTION SUBCUTANEOUS NIGHTLY
Status: DISCONTINUED | OUTPATIENT
Start: 2023-02-07 | End: 2023-02-09 | Stop reason: HOSPADM

## 2023-02-07 RX ORDER — AMOXICILLIN 250 MG
2 CAPSULE ORAL NIGHTLY
Status: DISCONTINUED | OUTPATIENT
Start: 2023-02-07 | End: 2023-02-09 | Stop reason: HOSPADM

## 2023-02-07 RX ORDER — POLYETHYLENE GLYCOL 3350 17 G/17G
17 POWDER, FOR SOLUTION ORAL DAILY
Status: DISCONTINUED | OUTPATIENT
Start: 2023-02-07 | End: 2023-02-09 | Stop reason: HOSPADM

## 2023-02-07 RX ORDER — ROSUVASTATIN CALCIUM 40 MG/1
40 TABLET, COATED ORAL NIGHTLY
Status: DISCONTINUED | OUTPATIENT
Start: 2023-02-07 | End: 2023-02-09 | Stop reason: HOSPADM

## 2023-02-07 RX ORDER — POTASSIUM CHLORIDE 750 MG/1
40 TABLET, FILM COATED, EXTENDED RELEASE ORAL 2 TIMES DAILY WITH MEALS
Status: COMPLETED | OUTPATIENT
Start: 2023-02-07 | End: 2023-02-07

## 2023-02-07 RX ORDER — GUAIFENESIN 600 MG/1
600 TABLET, EXTENDED RELEASE ORAL EVERY 12 HOURS SCHEDULED
Status: DISCONTINUED | OUTPATIENT
Start: 2023-02-07 | End: 2023-02-09 | Stop reason: HOSPADM

## 2023-02-07 RX ADMIN — CEPHALEXIN 1000 MG: 500 CAPSULE ORAL at 14:20

## 2023-02-07 RX ADMIN — ACETAMINOPHEN 650 MG: 325 TABLET ORAL at 11:41

## 2023-02-07 RX ADMIN — DULOXETINE HYDROCHLORIDE 60 MG: 60 CAPSULE, DELAYED RELEASE ORAL at 08:12

## 2023-02-07 RX ADMIN — LISINOPRIL 20 MG: 20 TABLET ORAL at 08:12

## 2023-02-07 RX ADMIN — CEPHALEXIN 1000 MG: 500 CAPSULE ORAL at 06:32

## 2023-02-07 RX ADMIN — ASPIRIN 81 MG: 81 TABLET, COATED ORAL at 08:12

## 2023-02-07 RX ADMIN — POTASSIUM CHLORIDE 40 MEQ: 750 TABLET, EXTENDED RELEASE ORAL at 17:58

## 2023-02-07 RX ADMIN — BUSPIRONE HYDROCHLORIDE 5 MG: 5 TABLET ORAL at 16:32

## 2023-02-07 RX ADMIN — CARVEDILOL 12.5 MG: 12.5 TABLET, FILM COATED ORAL at 17:59

## 2023-02-07 RX ADMIN — POTASSIUM CHLORIDE 40 MEQ: 750 TABLET, EXTENDED RELEASE ORAL at 11:41

## 2023-02-07 RX ADMIN — DOCUSATE SODIUM 50MG AND SENNOSIDES 8.6MG 2 TABLET: 8.6; 5 TABLET, FILM COATED ORAL at 22:17

## 2023-02-07 RX ADMIN — GUAIFENESIN 600 MG: 600 TABLET, EXTENDED RELEASE ORAL at 22:17

## 2023-02-07 RX ADMIN — ENOXAPARIN SODIUM 40 MG: 100 INJECTION SUBCUTANEOUS at 22:17

## 2023-02-07 RX ADMIN — MULTIPLE VITAMINS W/ MINERALS TAB 1 TABLET: TAB at 08:12

## 2023-02-07 RX ADMIN — ROSUVASTATIN CALCIUM 40 MG: 20 TABLET, FILM COATED ORAL at 22:18

## 2023-02-07 RX ADMIN — BUSPIRONE HYDROCHLORIDE 5 MG: 5 TABLET ORAL at 22:18

## 2023-02-07 RX ADMIN — CETIRIZINE HYDROCHLORIDE 10 MG: 10 TABLET ORAL at 11:41

## 2023-02-07 RX ADMIN — CARVEDILOL 12.5 MG: 12.5 TABLET, FILM COATED ORAL at 08:12

## 2023-02-07 RX ADMIN — CEPHALEXIN 1000 MG: 500 CAPSULE ORAL at 22:18

## 2023-02-07 RX ADMIN — Medication 1000 UNITS: at 08:12

## 2023-02-07 RX ADMIN — POLYETHYLENE GLYCOL 3350 17 G: 17 POWDER, FOR SOLUTION ORAL at 11:41

## 2023-02-07 RX ADMIN — GUAIFENESIN 600 MG: 600 TABLET, EXTENDED RELEASE ORAL at 11:41

## 2023-02-07 RX ADMIN — BUSPIRONE HYDROCHLORIDE 5 MG: 5 TABLET ORAL at 08:12

## 2023-02-07 NOTE — PROGRESS NOTES
Name: Gita Avelar ADMIT: 2023   : 1945  PCP: Ernie Wright APRN    MRN: [id number] LOS: 0 days   AGE/SEX: 78 y.o. female  ROOM: [facility]     Subjective   Subjective   Resting in bed. States she is sleepy. Denies any further dizziness with ambulation but reports she feels diffusely weak. She denies any chest pain, dyspnea, cough, fever or chills. No BM in 1 week and states she deals with constipation chronically. No abdominal pain and tolerating a diet.     Objective   Objective   Vital Signs  Temp:  [96.9 °F (36.1 °C)-97.5 °F (36.4 °C)] 96.9 °F (36.1 °C)  Heart Rate:  [63-73] 73  Resp:  [16] 16  BP: (118-200)/() 138/74  SpO2:  [95 %-100 %] 100 %  on   ;   Device (Oxygen Therapy): room air  Body mass index is 23.5 kg/m².     Physical Exam  Vitals and nursing note reviewed.   Constitutional:       Appearance: She is ill-appearing. She is not toxic-appearing.   HENT:      Head: Normocephalic and atraumatic.   Cardiovascular:      Rate and Rhythm: Normal rate and regular rhythm.      Pulses: Normal pulses.   Pulmonary:      Effort: Pulmonary effort is normal. No respiratory distress.      Comments: Diminished, fairly clear. On RA  Abdominal:      General: Bowel sounds are normal. There is no distension.      Palpations: Abdomen is soft.      Tenderness: There is no abdominal tenderness.   Musculoskeletal:         General: Swelling (trace in ankles BLE) present. No tenderness. Normal range of motion.      Cervical back: Normal range of motion and neck supple.   Skin:     General: Skin is warm and dry.      Findings: No bruising.   Neurological:      Mental Status: She is alert and oriented to person, place, and time.      Sensory: No sensory deficit.      Motor: Weakness present.      Coordination: Coordination normal.   Psychiatric:         Mood and Affect: Mood normal.         Behavior: Behavior normal.      Results Review:       I reviewed the patient's new clinical results.  Results from last  7 days   Lab Units 02/07/23  0459 02/06/23  0504 02/05/23  1744 02/02/23  0551   WBC 10*3/mm3 6.99 6.42 9.15 5.12   HEMOGLOBIN g/dL 11.9* 11.6* 14.0 12.3   PLATELETS 10*3/mm3 185 203 255 223     Results from last 7 days   Lab Units 02/07/23  0500 02/06/23  0504 02/05/23 1744 02/02/23  0551   SODIUM mmol/L 140 140 137 139   POTASSIUM mmol/L 3.4* 3.5 3.7 3.3*   CHLORIDE mmol/L 107 107 103 104   CO2 mmol/L 30.0* 29.9* 28.7 25.6   BUN mg/dL 17 21 25* 15   CREATININE mg/dL 0.75 0.66 0.83 0.61   GLUCOSE mg/dL 96 86 106* 102*   Estimated Creatinine Clearance: 60.6 mL/min (by C-G formula based on SCr of 0.75 mg/dL).  Results from last 7 days   Lab Units 02/07/23  0500 02/05/23 1744 02/02/23  0551 02/01/23  0602   ALBUMIN g/dL 3.0* 3.4* 3.1* 3.5   BILIRUBIN mg/dL 0.6 0.6 0.2 0.3   ALK PHOS U/L 69 76 62 66   AST (SGOT) U/L 13 18 20 21   ALT (SGPT) U/L 8 12 13 12     Results from last 7 days   Lab Units 02/07/23  0500 02/06/23  0504 02/05/23 1744 02/02/23  0551 02/01/23  0602   CALCIUM mg/dL 8.5* 8.7 9.3 8.5* 8.6   ALBUMIN g/dL 3.0*  --  3.4* 3.1* 3.5     Results from last 7 days   Lab Units 02/05/23  2108   LACTATE mmol/L 1.4     No results found for: HGBA1C, POCGLU    aspirin, 81 mg, Oral, Daily  busPIRone, 5 mg, Oral, TID  carvedilol, 12.5 mg, Oral, BID With Meals  cephalexin, 1,000 mg, Oral, Q8H  cholecalciferol, 1,000 Units, Oral, Daily  DULoxetine, 60 mg, Oral, Daily  lisinopril, 20 mg, Oral, Daily  multivitamin with minerals, 1 tablet, Oral, Daily  rosuvastatin, 20 mg, Oral, Nightly       Diet: Regular/House Diet; Texture: Regular Texture (IDDSI 7); Fluid Consistency: Thin (IDDSI 0)       Assessment/Plan     Active Hospital Problems    Diagnosis  POA   • **Generalized weakness [R53.1]  Yes   • Dizziness on standing [R42]  Yes   • Dysautonomia (HCC) [G90.1]  Yes   • 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     Brief H&P:  Ms. Avelar is a 78 year old female who was recently admitted to this facility 1/29/23 to 2/2/23 for cough and weakness. She was found to have COVID19 as well as UTI and 1/2 blood cultures positive for strep. She was treated with 5 days of remdesivir and weaned off steroids. She was discharged with planned 14 day course of antibiotics with Keflex 1 g TID. She was seen by PT/OT and cleared for home at that time. Patient returned to the ED this admission for ongoing weakness and 2 falls at home PTA. She reported dizziness with position changes as well.     • Generalized weakness/dizziness: Suspect secondary to recent viral illness and hospital stay. CT head with questionable hyperdensity noted. Radiologist recommended MRI brain which shows old lacunar infarcts, mucosal changes in sinuses and focal scalp edema likely from trauma. No acute findings. She is already on ASA/statin for stroke prevention. Will add Zyrtec and Mucinex for sinus changes. Neurology was consulted and feels symptoms are d/t dysautonomia from COVID19. PT/OT has seen and recommends SNF. Orthostatics are negative and I have stopped fluids. Will increase lisinopril back to home dosing as Bps are elevated.     • COVID19/recent strep septicemia:  Monitor oxygen requirements but she already completed prior remdesivir. Continue Keflex as recommended by ID from prior. Start Lovenox for VTE prevention since CT/MRI negative for bleeding.     • HTN: As above. Increase lisinopril dosing. Continue Coreg. Monitor trends.     • HLD: Statin therapy resumed. LFTs stable.    Add bowel regimen today.     Discussed with patient.     VTE Prophylaxis - Lovenox 40 mg SC daily  Code Status - Full code-  confirmed with patient at bedside 2/6.  Disposition - Anticipate discharge likely to SNF once placed.       DARLENE Calderon  [facility]  02/07/23  09:36 EST

## 2023-02-07 NOTE — PROGRESS NOTES
Patient Identification:  NAME:  Gita Avelar  Age:  78 y.o.   Sex:  female   :  1945   MRN:  [id number]       Chief complaint: Lightheaded type dizziness, vertebrobasilar insufficiency    History of present illness: Her MRI scan shows some chronic strokes but no evidence of an acute stroke.  Again she describes her sensation she gets as a lightheaded feeling blackening out of her vision.      Past medical history:  Past Medical History:   Diagnosis Date   • Arthritis     HANDS   • B12 deficiency    • Bladder prolapse, female, acquired    • Depression    • Diverticulitis of colon    • History of Guillain-[place] syndrome    • History of subdural hematoma    • Hyperlipidemia    • Macular degeneration of left eye    • Osteoporosis    • Stress incontinence    • Vitamin D deficiency        Allergies:  Patient has no known allergies.    Home medications:  Medications Prior to Admission   Medication Sig Dispense Refill Last Dose   • acetaminophen (TYLENOL) 500 MG tablet Take 1,000 mg by mouth Every 6 (Six) Hours As Needed for Mild Pain.   2023   • aspirin 81 MG tablet Take 81 mg by mouth Daily.   2023   • busPIRone (BUSPAR) 5 MG tablet Take 1 tablet by mouth 3 (Three) Times a Day. 90 tablet 0 2023   • carvedilol (COREG) 12.5 MG tablet Take 1 tablet by mouth 2 (Two) Times a Day With Meals for 30 days. 60 tablet 0 2023   • cephalexin (KEFLEX) 500 MG capsule Take 2 capsules by mouth Every 6 (Six) Hours for 35 doses. Indications: bacteremia 70 capsule 0 Past Week   • cholecalciferol (VITAMIN D3) 1000 UNITS tablet Take 1,000 Units by mouth Daily.   2023   • DULoxetine (CYMBALTA) 60 MG capsule Take 1 capsule by mouth Daily. Needs FU 90 capsule 0 2023   • lisinopril (PRINIVIL,ZESTRIL) 40 MG tablet Take 40 mg by mouth Daily.   2023   • Multiple Vitamins-Minerals (PRESERVISION AREDS PO) Take 1 tablet by mouth Daily.   2023   • rosuvastatin (Crestor) 20 MG tablet Take 1 tablet by mouth  Every Night. 90 tablet 1 2023   • [] benzonatate (TESSALON) 100 MG capsule Take 1 capsule by mouth 3 (Three) Times a Day As Needed for Cough for up to 4 days. 12 capsule 0 More than a month   • bisacodyl (DULCOLAX) 5 MG EC tablet Take 5 mg by mouth Daily As Needed for Constipation. Indications: Constipation   More than a month   • meclizine (ANTIVERT) 25 MG tablet Take 1 tablet by mouth 3 (Three) Times a Day As Needed for Dizziness. 21 tablet 0 More than a month        Hospital medications:  aspirin, 81 mg, Oral, Daily  busPIRone, 5 mg, Oral, TID  carvedilol, 12.5 mg, Oral, BID With Meals  cephalexin, 1,000 mg, Oral, Q8H  cetirizine, 10 mg, Oral, Daily  cholecalciferol, 1,000 Units, Oral, Daily  DULoxetine, 60 mg, Oral, Daily  enoxaparin, 40 mg, Subcutaneous, Nightly  guaiFENesin, 600 mg, Oral, Q12H  [START ON 2023] lisinopril, 40 mg, Oral, Daily  multivitamin with minerals, 1 tablet, Oral, Daily  polyethylene glycol, 17 g, Oral, Daily  potassium chloride, 40 mEq, Oral, BID With Meals  rosuvastatin, 20 mg, Oral, Nightly  senna-docusate sodium, 2 tablet, Oral, Nightly         •  acetaminophen **OR** acetaminophen **OR** acetaminophen  •  benzonatate  •  ondansetron      Objective:  Vitals Ranges:   Temp:  [96.9 °F (36.1 °C)-97.4 °F (36.3 °C)] 97.3 °F (36.3 °C)  Heart Rate:  [63-73] 64  Resp:  [16] 16  BP: (118-200)/() 140/73      Physical Exam:  She is awake alert and oriented x3 normal cranial nerves II through VII good  strength.  Toe wiggle reflexes trace toes downgoing    Results review:   I reviewed the patient's new clinical results.    Data review:  Lab Results (last 24 hours)     Procedure Component Value Units Date/Time    Comprehensive Metabolic Panel [[id number]]  (Abnormal) Collected: 23 0500    Specimen: Blood Updated: 02/07/23 0621     Glucose 96 mg/dL      BUN 17 mg/dL      Creatinine 0.75 mg/dL      Sodium 140 mmol/L      Potassium 3.4 mmol/L      Chloride 107 mmol/L       CO2 30.0 mmol/L      Calcium 8.5 mg/dL      Total Protein 5.3 g/dL      Albumin 3.0 g/dL      ALT (SGPT) 8 U/L      AST (SGOT) 13 U/L      Alkaline Phosphatase 69 U/L      Total Bilirubin 0.6 mg/dL      Globulin 2.3 gm/dL      A/G Ratio 1.3 g/dL      BUN/Creatinine Ratio 22.7     Anion Gap 3.0 mmol/L      eGFR 81.6 mL/min/1.73     Narrative:      GFR Normal >60  Chronic Kidney Disease <60  Kidney Failure <15    The GFR formula is only valid for adults with stable renal function between ages 18 and 70.    CBC (No Diff) [[id number]]  (Abnormal) Collected: 02/07/23 0459    Specimen: Blood Updated: 02/07/23 0519     WBC 6.99 10*3/mm3      RBC 3.98 10*6/mm3      Hemoglobin 11.9 g/dL      Hematocrit 36.1 %      MCV 90.7 fL      MCH 29.9 pg      MCHC 33.0 g/dL      RDW 12.7 %      RDW-SD 42.4 fl      MPV 9.5 fL      Platelets 185 10*3/mm3     Blood Culture - Blood, Hand, Left [[id number]]  (Normal) Collected: 02/05/23 2108    Specimen: Blood from Hand, Left Updated: 02/06/23 2145     Blood Culture No growth at 24 hours    Blood Culture - Blood, Arm, Right [[id number]]  (Normal) Collected: 02/05/23 2109    Specimen: Blood from Arm, Right Updated: 02/06/23 2145     Blood Culture No growth at 24 hours           Imaging:  Imaging Results (Last 24 Hours)     Procedure Component Value Units Date/Time    MRI Brain Without Contrast [[id number]] Collected: 02/07/23 0739     Updated: 02/07/23 0739    Narrative:      MRI OF THE BRAIN WITHOUT CONTRAST ON 02/07/2023     CLINICAL HISTORY: Patient has dizziness, 2 recent falls, generalized  weakness.     TECHNIQUE: Axial T1, FLAIR, fat-suppressed T2, axial diffusion and  gradient echo T2 sagittal T1 weighted images were obtained of the entire  head     There are no prior MRIs of the brain for comparison. This is correlated  to a noncontrast head CT yesterday morning from 02/06/2023 at 11:38 AM.     FINDINGS: There are extensive patchy and confluent areas of T2 high  signal extending from the  periventricular into the subcortical white  matter of the cerebral hemispheres consistent with moderate to severe  small vessel disease. There is a 5 x 6 mm old lacunar infarct in the  central left thalamus and a 3 mm old lacunar infarct in the anterior  right thalamus. There is a 7 x 4 mm old lacunar infarct extending from  the body of left caudate nucleus into the anterior left corona radiata  region. The remainder of the brain parenchyma is normal in signal  intensity. Specifically no diffusion-weighted abnormality is seen with  no acute infarct identified. On the gradient echo T2 weighted images no  acute or old blood breakdown products are seen intracranially. There is  mild cerebral atrophy. The ventricles are normal in size. I see no mass  effect. There is no midline shift. No extra axial fluid collections are  identified. There are moderate bilateral ethmoid and mild superior and  inferior bilateral maxillary sinus mucosal thickening. Mastoid and  middle ear cavities are clear. Good flow voids are demonstrated within  the cerebral vessels and in the dural venous sinuses. The calvarium and  skull base demonstrate normal marrow signal intensity. The orbits are  unremarkable. There is some minimal edema in the scalp overlying the  lateral left parietal bone likely scalp edema from recent trauma.       Impression:      1. No acute intracranial abnormality is identified.  2. There is extensive confluent T2 high signal throughout the cerebral  white matter and patchy T2 high signal in central pontine white matter  most consistent with moderate to severe small vessel disease. There is 3  mm old lacunar infarct in the inferior right thalamus and a 3 x 4 mm old  lacunar infarct in the anterior right thalamus is 6 x 5 mm old lacunar  infarct in the central left thalamus. A 7 x 4 mm old lacunar infarct  extending from the body of left caudate nucleus into the anterior left  corona radiata region. There is a very tiny 5  "x 3 mm old superior left  cerebellar infarct.  3. There is moderate mucosal thickening in the ethmoid and maxillary  sinuses bilaterally. There is some focal scalp edema overlying the  superior lateral left parietal bone likely edema in the scalp from  recent head trauma. Correlate clinically. The remainder of MRI of the  brain is normal specifically no acute infarct is seen.            PPE worn at all times washed before washed up afterwards disposed of everything properly is not within 6 feet of her for more than few minutes during my exam no aerosols used at any point    Assessment and Plan:     Note Per my consult of yesterday that this patient likely has some degree of dysautonomia related to the acute COVID infection.  Nonetheless, if anything her blood pressures have tended to run \"on the high side\" since she has been here.  She appears to have vertebrobasilar insufficiency either related to dysautonomia or orthostatic hypotension, even though her blood pressures have tended to run high here.  I still think her complaint is typical of decreased perfusion to the vertebrobasilar system as she gets lightheaded type of dizzy and her vision blacks out.  This is not vertigo.  Note that the MRI scan of her head shows small blood vessel disease and evidence of previous stroke, but no evidence of an acute stroke.    I am going to order carotid Dopplers which will also evaluate the amplitude and direction of flow in the vertebral arteries.      Subsequent to her discharge.  She might be a good candidate for a ZIO PATCH to make sure she is not having some recurrent bradycardia which would also present with this type of \"lightheaded feeling and blackening out of her vision\".    FYI I have ordered a hemoglobin A1c for tomorrow morning.  I have looked at her lipid panel which is abnormal and note that she is on Crestor that I am going to increase to 40 mg p.o. nightly.  She is already on a baby aspirin every day which I " will not change    Rosendo Mccarthy MD  02/07/23  14:38 EST

## 2023-02-07 NOTE — PROGRESS NOTES
Continued Stay Note  [facility]     Patient Name: Gita Avelar  MRN: [id number]  Today's Date: 2/7/2023    Admit Date: 2/5/2023    Plan: SNF (Referral in [facility]/Pending)   Discharge Plan     Row Name 02/07/23 1211       Plan    Plan SNF (Referral in [facility]/Pending)    Plan Comments Spoke to patient via phone at [phone]. Introduced self and role of CCP. Patient requested rehab at [place]. Requested a facility close to her home. Referrals placed in [facility] for [facility], [facility], [facility] and [facility]. Informed Janelle with [facility] and Jasmin with [facility] of referrals               Discharge Codes    No documentation.               Expected Discharge Date and Time     Expected Discharge Date Expected Discharge Time    Feb 8, 2023             Rea Bingham RN

## 2023-02-07 NOTE — DISCHARGE PLACEMENT REQUEST
"Gita Avelar (78 y.o. Female)     Date of Birth   1945    Social Security Number       Address   [address] [place] [address]    Home Phone   [phone]    MRN   [id number]       Yazidi   None    Marital Status                               Admission Date   2/5/23    Admission Type   Emergency    Admitting Provider   Za Jacobson MD    Attending Provider   Ephraim Briones MD    Department, Room/Bed   [address], 87/1       Discharge Date       Discharge Disposition       Discharge Destination                               Attending Provider: Ephraim Briones MD    Allergies: No Known Allergies    Isolation: Enh Drop/Con   Infection: COVID (confirmed) (01/29/23)   Code Status: CPR    Ht: 162.6 cm (64\")   Wt: 62.1 kg (136 lb 14.5 oz)    Admission Cmt: None   Principal Problem: Generalized weakness [R53.1]                 Active Insurance as of 2/5/2023     Primary Coverage     Payor Plan Insurance Group Employer/Plan Group    MEDICARE MEDICARE A & B      Payor Plan Address Payor Plan Phone Number Payor Plan Fax Number Effective Dates    [address] [phone]  1/1/2010 - None Entered    [facility] [id number]       Subscriber Name Subscriber Birth Date Member ID       GITA AVELAR 1945 [id number]           Secondary Coverage     Payor Plan Insurance Group Employer/Plan Group    [facility] [employer] [facility] PLAN [id number]     Payor Plan Address Payor Plan Phone Number Payor Plan Fax Number Effective Dates    [facility] [phone]  1/1/2016 - None Entered    [address]       [facility] [facility] [address]       Subscriber Name Subscriber Birth Date Member ID       GITA AVELAR 1945 [id number]                 Emergency Contacts      (Rel.) Home Phone Work Phone Mobile Phone    Mahesh Avelar (Son) [phone] -- --    CHARITY AVELAR (Daughter) [phone] -- --              "

## 2023-02-07 NOTE — PLAN OF CARE
Goal Outcome Evaluation:              Outcome Evaluation: VSS, SL, Orthostatic BP done, falls precautions and enhanced droplet/contact isolation precautions maintained, pt ambulating to bathroom with assist x 1 and gait belt, MRI to be completed this AM, voiding freely, tolerating diet, patient denies pain and nausea so far this shift.

## 2023-02-08 LAB
ANION GAP SERPL CALCULATED.3IONS-SCNC: 3 MMOL/L (ref 5–15)
BH CV XLRA MEAS LEFT DIST CCA EDV: 12.2 CM/SEC
BH CV XLRA MEAS LEFT DIST CCA PSV: 49.8 CM/SEC
BH CV XLRA MEAS LEFT DIST ICA EDV: -21.5 CM/SEC
BH CV XLRA MEAS LEFT DIST ICA PSV: -60.6 CM/SEC
BH CV XLRA MEAS LEFT ICA/CCA RATIO: 1.22
BH CV XLRA MEAS LEFT MID ICA EDV: -14.5 CM/SEC
BH CV XLRA MEAS LEFT MID ICA PSV: -53.1 CM/SEC
BH CV XLRA MEAS LEFT PROX CCA EDV: 10.2 CM/SEC
BH CV XLRA MEAS LEFT PROX CCA PSV: 61.1 CM/SEC
BH CV XLRA MEAS LEFT PROX ECA EDV: -6.2 CM/SEC
BH CV XLRA MEAS LEFT PROX ECA PSV: -111.2 CM/SEC
BH CV XLRA MEAS LEFT PROX ICA EDV: -12.3 CM/SEC
BH CV XLRA MEAS LEFT PROX ICA PSV: -54 CM/SEC
BH CV XLRA MEAS LEFT PROX SCLA PSV: 104.4 CM/SEC
BH CV XLRA MEAS LEFT VERTEBRAL A EDV: 6.9 CM/SEC
BH CV XLRA MEAS LEFT VERTEBRAL A PSV: 51.1 CM/SEC
BH CV XLRA MEAS RIGHT DIST CCA EDV: 10.6 CM/SEC
BH CV XLRA MEAS RIGHT DIST CCA PSV: 45.9 CM/SEC
BH CV XLRA MEAS RIGHT DIST ICA EDV: -11.9 CM/SEC
BH CV XLRA MEAS RIGHT DIST ICA PSV: -51.7 CM/SEC
BH CV XLRA MEAS RIGHT ICA/CCA RATIO: 1.13
BH CV XLRA MEAS RIGHT MID ICA EDV: -10.7 CM/SEC
BH CV XLRA MEAS RIGHT MID ICA PSV: -45.5 CM/SEC
BH CV XLRA MEAS RIGHT PROX CCA EDV: 7.8 CM/SEC
BH CV XLRA MEAS RIGHT PROX CCA PSV: 45.1 CM/SEC
BH CV XLRA MEAS RIGHT PROX ECA EDV: -5.6 CM/SEC
BH CV XLRA MEAS RIGHT PROX ECA PSV: -93.8 CM/SEC
BH CV XLRA MEAS RIGHT PROX ICA EDV: -6.6 CM/SEC
BH CV XLRA MEAS RIGHT PROX ICA PSV: -37 CM/SEC
BH CV XLRA MEAS RIGHT PROX SCLA PSV: 87 CM/SEC
BH CV XLRA MEAS RIGHT VERTEBRAL A EDV: 9.4 CM/SEC
BH CV XLRA MEAS RIGHT VERTEBRAL A PSV: 45.2 CM/SEC
BUN SERPL-MCNC: 15 MG/DL (ref 8–23)
BUN/CREAT SERPL: 19.2 (ref 7–25)
CALCIUM SPEC-SCNC: 8.8 MG/DL (ref 8.6–10.5)
CHLORIDE SERPL-SCNC: 109 MMOL/L (ref 98–107)
CO2 SERPL-SCNC: 27 MMOL/L (ref 22–29)
CREAT SERPL-MCNC: 0.78 MG/DL (ref 0.57–1)
DEPRECATED RDW RBC AUTO: 40.9 FL (ref 37–54)
EGFRCR SERPLBLD CKD-EPI 2021: 77.9 ML/MIN/1.73
ERYTHROCYTE [DISTWIDTH] IN BLOOD BY AUTOMATED COUNT: 12.4 % (ref 12.3–15.4)
GLUCOSE SERPL-MCNC: 84 MG/DL (ref 65–99)
HBA1C MFR BLD: 5.7 % (ref 4.8–5.6)
HCT VFR BLD AUTO: 35.5 % (ref 34–46.6)
HGB BLD-MCNC: 11.8 G/DL (ref 12–15.9)
MAXIMAL PREDICTED HEART RATE: 142 BPM
MCH RBC QN AUTO: 30.3 PG (ref 26.6–33)
MCHC RBC AUTO-ENTMCNC: 33.2 G/DL (ref 31.5–35.7)
MCV RBC AUTO: 91 FL (ref 79–97)
PLATELET # BLD AUTO: 196 10*3/MM3 (ref 140–450)
PMV BLD AUTO: 10.4 FL (ref 6–12)
POTASSIUM SERPL-SCNC: 4.3 MMOL/L (ref 3.5–5.2)
POTASSIUM SERPL-SCNC: 4.8 MMOL/L (ref 3.5–5.2)
RBC # BLD AUTO: 3.9 10*6/MM3 (ref 3.77–5.28)
SODIUM SERPL-SCNC: 139 MMOL/L (ref 136–145)
STRESS TARGET HR: 121 BPM
WBC NRBC COR # BLD: 7.23 10*3/MM3 (ref 3.4–10.8)
WHOLE BLOOD HOLD SPECIMEN: NORMAL

## 2023-02-08 PROCEDURE — 80048 BASIC METABOLIC PNL TOTAL CA: CPT | Performed by: NURSE PRACTITIONER

## 2023-02-08 PROCEDURE — 84132 ASSAY OF SERUM POTASSIUM: CPT | Performed by: HOSPITALIST

## 2023-02-08 PROCEDURE — 25010000002 ENOXAPARIN PER 10 MG: Performed by: HOSPITALIST

## 2023-02-08 PROCEDURE — 97110 THERAPEUTIC EXERCISES: CPT

## 2023-02-08 PROCEDURE — 85027 COMPLETE CBC AUTOMATED: CPT | Performed by: NURSE PRACTITIONER

## 2023-02-08 PROCEDURE — 83036 HEMOGLOBIN GLYCOSYLATED A1C: CPT | Performed by: PSYCHIATRY & NEUROLOGY

## 2023-02-08 PROCEDURE — 97116 GAIT TRAINING THERAPY: CPT

## 2023-02-08 RX ORDER — BISACODYL 10 MG
10 SUPPOSITORY, RECTAL RECTAL DAILY PRN
Status: DISCONTINUED | OUTPATIENT
Start: 2023-02-08 | End: 2023-02-09 | Stop reason: HOSPADM

## 2023-02-08 RX ADMIN — ASPIRIN 81 MG: 81 TABLET, COATED ORAL at 09:38

## 2023-02-08 RX ADMIN — Medication 1000 UNITS: at 09:38

## 2023-02-08 RX ADMIN — CARVEDILOL 12.5 MG: 12.5 TABLET, FILM COATED ORAL at 18:17

## 2023-02-08 RX ADMIN — ROSUVASTATIN CALCIUM 40 MG: 20 TABLET, FILM COATED ORAL at 22:13

## 2023-02-08 RX ADMIN — CEPHALEXIN 1000 MG: 500 CAPSULE ORAL at 14:57

## 2023-02-08 RX ADMIN — CETIRIZINE HYDROCHLORIDE 10 MG: 10 TABLET ORAL at 09:38

## 2023-02-08 RX ADMIN — DULOXETINE HYDROCHLORIDE 60 MG: 60 CAPSULE, DELAYED RELEASE ORAL at 09:38

## 2023-02-08 RX ADMIN — BUSPIRONE HYDROCHLORIDE 5 MG: 5 TABLET ORAL at 22:13

## 2023-02-08 RX ADMIN — POLYETHYLENE GLYCOL 3350 17 G: 17 POWDER, FOR SOLUTION ORAL at 09:38

## 2023-02-08 RX ADMIN — GUAIFENESIN 600 MG: 600 TABLET, EXTENDED RELEASE ORAL at 22:13

## 2023-02-08 RX ADMIN — CEPHALEXIN 1000 MG: 500 CAPSULE ORAL at 05:25

## 2023-02-08 RX ADMIN — BUSPIRONE HYDROCHLORIDE 5 MG: 5 TABLET ORAL at 09:38

## 2023-02-08 RX ADMIN — LISINOPRIL 40 MG: 40 TABLET ORAL at 09:38

## 2023-02-08 RX ADMIN — GUAIFENESIN 600 MG: 600 TABLET, EXTENDED RELEASE ORAL at 09:38

## 2023-02-08 RX ADMIN — ENOXAPARIN SODIUM 40 MG: 100 INJECTION SUBCUTANEOUS at 22:14

## 2023-02-08 RX ADMIN — MULTIPLE VITAMINS W/ MINERALS TAB 1 TABLET: TAB at 09:38

## 2023-02-08 RX ADMIN — BUSPIRONE HYDROCHLORIDE 5 MG: 5 TABLET ORAL at 16:28

## 2023-02-08 RX ADMIN — CEPHALEXIN 1000 MG: 500 CAPSULE ORAL at 22:13

## 2023-02-08 RX ADMIN — CARVEDILOL 12.5 MG: 12.5 TABLET, FILM COATED ORAL at 09:38

## 2023-02-08 RX ADMIN — DOCUSATE SODIUM 50MG AND SENNOSIDES 8.6MG 2 TABLET: 8.6; 5 TABLET, FILM COATED ORAL at 22:22

## 2023-02-08 NOTE — PLAN OF CARE
Goal Outcome Evaluation:  Plan of Care Reviewed With: patient        Progress: improving  Outcome Evaluation: Pt seen for PT treatment today. Pt is progressing with mobility but does exhibit decreased activity tolerance. Pt is SBA with bed mobility and CGA with STS transfers. Pt able to ambulate 40ft with rwx, CGA. Pt needed cues to increase step length and correct posture. Pt able to complete bilateral LE exercises. Will continue to progress pt as able.    ..Patient was not wearing a face mask during this therapy encounter. Therapist used appropriate personal protective equipment including gown, eye protection, mask and gloves.  Mask used was N95/duckbill. Appropriate PPE was worn during the entire therapy session. Hand hygiene was completed before and after therapy session. Patient is in enhanced droplet precautions.

## 2023-02-08 NOTE — PLAN OF CARE
Goal Outcome Evaluation:  Plan of Care Reviewed With: patient        Progress: improving  Outcome Evaluation: pt pleasant and cooperative, amb to BR w assist x1, fall precautions, voiding, no BM still, possible d/c to rehab tomorrow, remains in covid isolation but tomorrow is day 11, neuro signed off, accumax on bed, pt moves herself well in the bed, pt refuses IV access

## 2023-02-08 NOTE — PROGRESS NOTES
I have reviewed the carotid Doppler study and it does not show significant stenosis of either carotid artery nor is there any evidence of decreased flow in either vertebral artery.  Therefore there is no evidence of significant posterior circulation dysfunction.  As patient has had some dysautonomia.  No doubt, made worse with COVID but I do not see evidence of an acute stroke or TIA syndrome.  Neurology will sign off and follow-up as needed reconsult  Thanks  Rosendo Mccarthy MD

## 2023-02-08 NOTE — THERAPY TREATMENT NOTE
Patient Name: Gita Avelar  : 1945    MRN: [id number]                              Today's Date: 2023       Admit Date: 2023    Visit Dx:     ICD-10-CM ICD-9-CM   1. Generalized weakness  R53.1 780.79   2. Dehydration  E86.0 276.51   3. Fall, initial encounter  W19.XXXA E888.9   4. COVID-19 virus infection  U07.1 079.89     Patient Active Problem List   Diagnosis   • Avitaminosis D   • B12 deficiency   • Near syncope   • Atrophic vaginitis   • Awareness of heartbeats   • OP (osteoporosis)   • HLD (hyperlipidemia)   • Fatigue   • Essential hypertension   • Moderate episode of recurrent major depressive disorder (HCC)   • Nipple discharge   • Abnormal ultrasound of breast   • Abnormal mammogram   • Lump or mass in breast   • Family history of malignant neoplasm of pancreas   • Atherosclerosis of both carotid arteries   • Acute UTI   • COVID-19 virus infection   • Hypoxia   • Streptococcal septicemia (HCC)   • Generalized weakness   • Dizziness on standing   • History of Guillain-Haddam syndrome   • History of subdural hematoma   • Dysautonomia (HCC)     Past Medical History:   Diagnosis Date   • Arthritis     HANDS   • B12 deficiency    • Bladder prolapse, female, acquired    • Depression    • Diverticulitis of colon    • History of Guillain-Haddam syndrome    • History of subdural hematoma    • Hyperlipidemia    • Macular degeneration of left eye    • Osteoporosis    • Stress incontinence    • Vitamin D deficiency      Past Surgical History:   Procedure Laterality Date   • BREAST BIOPSY     • BREAST BIOPSY Left 2017    Procedure: left breast excisional biopsy using a lacrimal duct probe and the intraoperative ultrasound. ;  Surgeon: Mallory Lieberman MD;  Location: [facility];  Service:    • HYSTERECTOMY        General Information     Row Name 23 1532          Physical Therapy Time and Intention    Document Type therapy note (daily note)  -EB     Mode of Treatment individual  therapy;physical therapy  -EB     Row Name 02/08/23 1532          General Information    Existing Precautions/Restrictions fall  -EB     Row Name 02/08/23 1532          Cognition    Orientation Status (Cognition) oriented x 3  -EB     Row Name 02/08/23 1532          Safety Issues, Functional Mobility    Impairments Affecting Function (Mobility) balance;endurance/activity tolerance;strength;range of motion (ROM)  -EB           User Key  (r) = Recorded By, (t) = Taken By, (c) = Cosigned By    Initials Name Provider Type     Minda Zamora PTA Physical Therapist Assistant               Mobility     Row Name 02/08/23 1532          Bed Mobility    Bed Mobility supine-sit-supine  -EB     Supine-Sit-Supine [place] (Bed Mobility) standby assist  -EB     Assistive Device (Bed Mobility) bed rails;head of bed elevated  -EB     Row Name 02/08/23 1532          Sit-Stand Transfer    Sit-Stand [place] (Transfers) contact guard  -EB     Assistive Device (Sit-Stand Transfers) walker, front-wheeled  -EB     Comment, (Sit-Stand Transfer) cues for hand placement; 2 STS transfers.  -     Row Name 02/08/23 1532          Gait/Stairs (Locomotion)    [place] Level (Gait) contact guard  -EB     Assistive Device (Gait) walker, front-wheeled  -EB     Distance in Feet (Gait) 40ft  -EB     Deviations/Abnormal Patterns (Gait) base of support, narrow;dexter decreased;festinating/shuffling;stride length decreased  -EB     Bilateral Gait Deviations forward flexed posture  -EB     Comment, (Gait/Stairs) cues for pt to increase step length, 1 short standing rest break.  -EB           User Key  (r) = Recorded By, (t) = Taken By, (c) = Cosigned By    Initials Name Provider Type    Minda Quezada PTA Physical Therapist Assistant               Obj/Interventions     Row Name 02/08/23 1534          Motor Skills    Therapeutic Exercise --  BLE: AP, LAQs, seated marches (X10)  -EB           User Key  (r) = Recorded By, (t) = Taken By,  (c) = Cosigned By    Initials Name Provider Type    Minda Quezada PTA Physical Therapist Assistant               Goals/Plan    No documentation.                Clinical Impression     Row Name 02/08/23 1534          Plan of Care Review    Plan of Care Reviewed With patient  -EB     Progress improving  -EB     Outcome Evaluation Pt seen for PT treatment today. Pt is progressing with mobility but does exhibit decreased activity tolerance. Pt is SBA with bed mobility and CGA with STS transfers. Pt able to ambulate 40ft with rwx, CGA. Pt needed cues to increase step length and correct posture. Pt able to complete bilateral LE exercises. Will continue to progress pt as able.  -EB     Row Name 02/08/23 1534          Therapy Assessment/Plan (PT)    Therapy Frequency (PT) 3 times/wk  -EB     Row Name 02/08/23 1534          Positioning and Restraints    Pre-Treatment Position in bed  -EB     Post Treatment Position bed  -EB     In Bed supine;call light within reach;encouraged to call for assist;exit alarm on  -EB           User Key  (r) = Recorded By, (t) = Taken By, (c) = Cosigned By    Initials Name Provider Type    Minda Quezada PTA Physical Therapist Assistant               Outcome Measures     Row Name 02/08/23 1536 02/08/23 0850       How much help from another person do you currently need...    Turning from your back to your side while in flat bed without using bedrails? 4  -EB 4  -VL    Moving from lying on back to sitting on the side of a flat bed without bedrails? 4  -EB 4  -VL    Moving to and from a bed to a chair (including a wheelchair)? 3  -EB 4  -VL    Standing up from a chair using your arms (e.g., wheelchair, bedside chair)? 3  -EB 4  -VL    Climbing 3-5 steps with a railing? 2  -EB 3  -VL    To walk in hospital room? 3  -EB 3  -VL    AM-PAC 6 Clicks Score (PT) 19  -EB 22  -VL    Highest level of mobility 6 --> Walked 10 steps or more  -EB 7 --> Walked 25 feet or more  -VL    Row Name 02/08/23 1536           Modified [place] Scale    Modified [place] Scale 3 - Moderate disability.  Requiring some help, but able to walk without assistance.  -EB           User Key  (r) = Recorded By, (t) = Taken By, (c) = Cosigned By    Initials Name Provider Type    Tasia Valentin, RN Registered Nurse    Minda Quezada PTA Physical Therapist Assistant                             Physical Therapy Education     Title: PT OT SLP Therapies (In Progress)     Topic: Physical Therapy (In Progress)     Point: Mobility training (Done)     Learning Progress Summary           Patient Acceptance, E,D, VU,NR by MONCHO at 2/8/2023 1536    Acceptance, E,TB, VU by MB at 2/6/2023 1403                   Point: Home exercise program (Done)     Learning Progress Summary           Patient Acceptance, E,D, VU,NR by MONCHO at 2/8/2023 1536                   Point: Body mechanics (Done)     Learning Progress Summary           Patient Acceptance, E,D, VU,NR by MONCHO at 2/8/2023 1536                   Point: Precautions (Not Started)     Learner Progress:  Not documented in this visit.                      User Key     Initials Effective Dates Name Provider Type Discipline    MONCHO 06/16/21 -  Minda Zamora PTA Physical Therapist Assistant PT    MB 12/30/22 -  Henok Erazo, YASEMIN Student PT Student PT              PT Recommendation and Plan     Plan of Care Reviewed With: patient  Progress: improving  Outcome Evaluation: Pt seen for PT treatment today. Pt is progressing with mobility but does exhibit decreased activity tolerance. Pt is SBA with bed mobility and CGA with STS transfers. Pt able to ambulate 40ft with rwx, CGA. Pt needed cues to increase step length and correct posture. Pt able to complete bilateral LE exercises. Will continue to progress pt as able.     Time Calculation:    PT Charges     Row Name 02/08/23 1532             Time Calculation    Start Time 1426  -EB      Stop Time 1449  -EB      Time Calculation (min) 23 min  -EB      PT Received On  02/08/23  -EB      PT - Next Appointment 02/10/23  -EB         Time Calculation- PT    Total Timed Code Minutes- PT 23 minute(s)  -EB            User Key  (r) = Recorded By, (t) = Taken By, (c) = Cosigned By    Initials Name Provider Type    Minda Quezada PTA Physical Therapist Assistant              Therapy Charges for Today     Code Description Service Date Service Provider Modifiers Qty    31851530778 HC PT THER PROC EA 15 MIN 2/8/2023 Minda Zamora PTA GP 1    35212934355 HC GAIT TRAINING EA 15 MIN 2/8/2023 Minda Zamora PTA GP 1          PT G-Codes  Outcome Measure Options: AM-PAC 6 Clicks Daily Activity (OT), Modified Abelino  AM-PAC 6 Clicks Score (PT): 19  AM-PAC 6 Clicks Score (OT): 18  Modified [place] Scale: 3 - Moderate disability.  Requiring some help, but able to walk without assistance.       Minda Zamora PTA  2/8/2023

## 2023-02-08 NOTE — PROGRESS NOTES
Name: Gita Avelar ADMIT: 2023   : 1945  PCP: Ernie Wright APRN    MRN: [id number] LOS: 0 days   AGE/SEX: 78 y.o. female  ROOM: [facility]     Subjective   Subjective   Patient was generally weak and in no apparent distress.  Tolerating diet albeit decreased intake/appetite.  Voices no new concerns.    Review of Systems   Constitutional: Negative for chills and fever.   Respiratory: Negative for cough and shortness of breath.    Cardiovascular: Negative for chest pain and leg swelling.   Gastrointestinal: Negative for nausea and vomiting.   Genitourinary: Negative for difficulty urinating and dysuria.   Musculoskeletal: Negative for gait problem.        Objective   Objective   Vital Signs  Temp:  [97 °F (36.1 °C)-97.6 °F (36.4 °C)] 97.5 °F (36.4 °C)  Heart Rate:  [61-72] 64  Resp:  [16] 16  BP: (121-140)/(64-82) 134/68  SpO2:  [96 %-98 %] 98 %  on   ;   Device (Oxygen Therapy): room air  Body mass index is 23.5 kg/m².     Physical Exam  Constitutional:       General: She is not in acute distress.     Appearance: She is not toxic-appearing.      Comments: Generally weak   Cardiovascular:      Rate and Rhythm: Normal rate.      Heart sounds: Murmur heard.   Pulmonary:      Effort: Pulmonary effort is normal.      Breath sounds: Normal breath sounds.   Abdominal:      General: Bowel sounds are normal.      Palpations: Abdomen is soft.   Musculoskeletal:      Right lower leg: No edema.      Left lower leg: No edema.   Skin:     General: Skin is warm and dry.   Neurological:      Mental Status: She is alert.       Results Review     I reviewed the patient's new clinical results.  Results from last 7 days   Lab Units 23  0524 23  0459 23  0504 23  1744   WBC 10*3/mm3 7.23 6.99 6.42 9.15   HEMOGLOBIN g/dL 11.8* 11.9* 11.6* 14.0   PLATELETS 10*3/mm3 196 185 203 255     Results from last 7 days   Lab Units 23  0907 23  0038 23  0500 23  0504 23  1744    SODIUM mmol/L 139  --  140 140 137   POTASSIUM mmol/L 4.8 4.3 3.4* 3.5 3.7   CHLORIDE mmol/L 109*  --  107 107 103   CO2 mmol/L 27.0  --  30.0* 29.9* 28.7   BUN mg/dL 15  --  17 21 25*   CREATININE mg/dL 0.78  --  0.75 0.66 0.83   GLUCOSE mg/dL 84  --  96 86 106*   EGFR mL/min/1.73 77.9  --  81.6 89.9 72.3     Results from last 7 days   Lab Units 02/07/23  0500 02/05/23  1744 02/02/23  0551   ALBUMIN g/dL 3.0* 3.4* 3.1*   BILIRUBIN mg/dL 0.6 0.6 0.2   ALK PHOS U/L 69 76 62   AST (SGOT) U/L 13 18 20   ALT (SGPT) U/L 8 12 13     Results from last 7 days   Lab Units 02/08/23  0907 02/07/23  0500 02/06/23  0504 02/05/23 1744 02/02/23  0551   CALCIUM mg/dL 8.8 8.5* 8.7 9.3 8.5*   ALBUMIN g/dL  --  3.0*  --  3.4* 3.1*     Results from last 7 days   Lab Units 02/05/23  2108   LACTATE mmol/L 1.4     Hemoglobin A1C   Date/Time Value Ref Range Status   02/08/2023 0524 5.70 (H) 4.80 - 5.60 % Final       MRI Brain Without Contrast    Result Date: 2/7/2023  1. There is a 3 mm faint rounded area of diffusion hyperintensity in the anterior body of the right caudate nucleus seen on axial diffusion weighted image 16 sequence 4 that is too small to adequately characterize on the ADC maps and is either T2 shine through artifact or could potentially be a very tiny acute to subacute lacunar type infarct. Otherwise, no acute intracranial abnormality is identified. 2. There is extensive confluent T2 high signal throughout the cerebral white matter and patchy T2 high signal in central pontine white matter most consistent with moderate to severe small vessel disease. There is 3 mm old lacunar infarct in the inferior right thalamus and a 3 x 4 mm old lacunar infarct in the anterior right thalamus is 6 x 5 mm old lacunar infarct in the central left thalamus. A 7 x 4 mm old lacunar infarct extending from the body of left caudate nucleus into the anterior left corona radiata region. There is a very tiny 5 x 3 mm old superior left cerebellar  infarct. 3. There is moderate mucosal thickening in the ethmoid and maxillary sinuses bilaterally. There is some focal scalp edema overlying the superior lateral left parietal bone likely edema in the scalp from recent head trauma. Correlate clinically. The remainder of MRI of the brain is normal. Specifically, no signal abnormality is seen at the level of the 3 mm hyperdense focus within the cortex of the left precentral gyrus on yesterday's head CT and therefore this was most likely an artifact. The results were discussed with Dr. Briones by telephone on 02/07/2023 at 5:00 PM  This report was finalized on 2/7/2023 5:21 PM by Dr. Mark Summers M.D.      I have personally reviewed all medications:  Scheduled Medications  aspirin, 81 mg, Oral, Daily  busPIRone, 5 mg, Oral, TID  carvedilol, 12.5 mg, Oral, BID With Meals  cephalexin, 1,000 mg, Oral, Q8H  cetirizine, 10 mg, Oral, Daily  cholecalciferol, 1,000 Units, Oral, Daily  DULoxetine, 60 mg, Oral, Daily  enoxaparin, 40 mg, Subcutaneous, Nightly  guaiFENesin, 600 mg, Oral, Q12H  lisinopril, 40 mg, Oral, Daily  multivitamin with minerals, 1 tablet, Oral, Daily  polyethylene glycol, 17 g, Oral, Daily  rosuvastatin, 40 mg, Oral, Nightly  senna-docusate sodium, 2 tablet, Oral, Nightly    Infusions   Diet  Diet: Regular/House Diet; Texture: Regular Texture (IDDSI 7); Fluid Consistency: Thin (IDDSI 0)    I have personally reviewed:  [x]  Laboratory   [x]  Microbiology   [x]  Radiology   [x]  EKG/Telemetry  [x]  Cardiology/Vascular   [x]  Pathology    [x]  Records       Assessment/Plan     Active Hospital Problems    Diagnosis  POA   • **Generalized weakness [R53.1]  Yes   • Dizziness on standing [R42]  Yes   • Dysautonomia (HCC) [G90.1]  Yes   • History of Guillain-Reynolds syndrome [Z86.69]  Not Applicable   • History of subdural hematoma [Z86.79]  Not Applicable   • Streptococcal septicemia (HCC) [A40.9]  Yes   • COVID-19 virus infection [U07.1]  Yes   • Atherosclerosis  of both carotid arteries [I65.23]  Yes   • HLD (hyperlipidemia) [E78.5]  Yes   • Essential hypertension [I10]  Yes      Resolved Hospital Problems   No resolved problems to display.       78 y.o. female admitted with Generalized weakness.    COVID-19: Previously completed 5 days course of remdesivir and weaned off steroids.  Tolerating room air.    Generalized weakness/dizziness: Suspect due to viral illness and deconditioning/prolonged hospitalization.  MRI brain showing old lacunar infarcts.  No acute findings.  ASA statin continued for stroke prevention.  Zyrtec and Mucinex for sinus changes.  Neurology suspects symptoms due to dysautonomia from COVID-19 and signed off no evidence for significant stenosis.  Of either internal carotid artery on Doppler study.  Therapies recommend SNF.  Orthostatics negative.      Hypertension: BP acceptable with current regimen--carvedilol 12.5 mg p.o. twice daily, lisinopril 40 mg p.o. daily given unremarkable renal function.    Recent strep septicemia: Keflex per ID.    No BM recorded.  Denies nausea or vomiting.  Abdomen nonacute on exam.  Most likely due to decreased intake.  Continue bowel regimen & provide dulcolax PRN.    · Enoxaparin for DVT prophylaxis.  · CPR previously confirmed with patient  · Discussed with patient and RN.  · Anticipate discharge SNF on 2/9/2023--CCP following      DARLENE Ramos  [facility]  02/08/23  16:24 EST

## 2023-02-08 NOTE — PLAN OF CARE
Goal Outcome Evaluation:              Outcome Evaluation: VSS. Enhanced Droplet/Contact precautions maintained. Fall precautions maintained. K+: 3.4, 2 doses given. AM labs ordered. Added bowel regimen.

## 2023-02-08 NOTE — CASE MANAGEMENT/SOCIAL WORK
Continued Stay Note  [facility]     Patient Name: Gita Avelar  MRN: [id number]  Today's Date: 2/8/2023    Admit Date: 2/5/2023    Plan: [facility] SNF- bed available tomorrow 2/9   Discharge Plan     Row Name 02/08/23 1305       Plan    Plan [facility] SNF- bed available tomorrow 2/9    Patient/Family in Agreement with Plan yes    Plan Comments CCP spoke with patient via room phone due to isolation. CCP reviewed SNF options with patient and she is agreeable to [facility], as it is closest to her home. [facility] states patient is accepted at [facility] with a bed available tomorrow 2/9. Patient states her son can transport her tomorrow. If she discharges Friday she would need transport arranged. Packet in CCP office. Pharmacy updated to [facility]. CCP will follow. Volodymyr SANTANA RN CCP               Discharge Codes    No documentation.               Expected Discharge Date and Time     Expected Discharge Date Expected Discharge Time    Feb 8, 2023             Volodymyr Boogie RN

## 2023-02-08 NOTE — PLAN OF CARE
Goal Outcome Evaluation:              Outcome Evaluation: VSS, afebrile, on room air, potassium currently 4.3, voiding freely, tolerating diet, enhanced droplet/contact precautions maintained, falls precautions maintained, no BM this shift, AM labs ordered, orthostatic blood pressure completed.

## 2023-02-09 VITALS
RESPIRATION RATE: 16 BRPM | TEMPERATURE: 97.3 F | BODY MASS INDEX: 23.37 KG/M2 | OXYGEN SATURATION: 95 % | SYSTOLIC BLOOD PRESSURE: 122 MMHG | HEIGHT: 64 IN | HEART RATE: 68 BPM | WEIGHT: 136.91 LBS | DIASTOLIC BLOOD PRESSURE: 68 MMHG

## 2023-02-09 PROBLEM — D89.831 CYTOKINE RELEASE SYNDROME, GRADE 1: Status: ACTIVE | Noted: 2023-02-09

## 2023-02-09 PROCEDURE — G0180 MD CERTIFICATION HHA PATIENT: HCPCS

## 2023-02-09 RX ORDER — BENZONATATE 100 MG/1
100 CAPSULE ORAL 3 TIMES DAILY PRN
Qty: 12 CAPSULE | Refills: 0 | Status: SHIPPED | OUTPATIENT
Start: 2023-02-09 | End: 2023-02-13

## 2023-02-09 RX ORDER — GUAIFENESIN 600 MG/1
600 TABLET, EXTENDED RELEASE ORAL EVERY 12 HOURS SCHEDULED
Start: 2023-02-09 | End: 2023-03-28

## 2023-02-09 RX ORDER — POLYETHYLENE GLYCOL 3350 17 G/17G
17 POWDER, FOR SOLUTION ORAL DAILY
Start: 2023-02-09 | End: 2023-03-28

## 2023-02-09 RX ORDER — AMOXICILLIN 250 MG
2 CAPSULE ORAL NIGHTLY
Start: 2023-02-09 | End: 2023-03-28

## 2023-02-09 RX ORDER — CEPHALEXIN 500 MG/1
1000 CAPSULE ORAL EVERY 8 HOURS SCHEDULED
Qty: 30 CAPSULE | Refills: 0 | Status: SHIPPED | OUTPATIENT
Start: 2023-02-09 | End: 2023-02-14

## 2023-02-09 RX ADMIN — GUAIFENESIN 600 MG: 600 TABLET, EXTENDED RELEASE ORAL at 09:54

## 2023-02-09 RX ADMIN — DULOXETINE HYDROCHLORIDE 60 MG: 60 CAPSULE, DELAYED RELEASE ORAL at 09:54

## 2023-02-09 RX ADMIN — ASPIRIN 81 MG: 81 TABLET, COATED ORAL at 09:54

## 2023-02-09 RX ADMIN — CEPHALEXIN 1000 MG: 500 CAPSULE ORAL at 06:33

## 2023-02-09 RX ADMIN — LISINOPRIL 40 MG: 40 TABLET ORAL at 10:10

## 2023-02-09 RX ADMIN — CETIRIZINE HYDROCHLORIDE 10 MG: 10 TABLET ORAL at 09:54

## 2023-02-09 RX ADMIN — CEPHALEXIN 1000 MG: 500 CAPSULE ORAL at 14:21

## 2023-02-09 RX ADMIN — Medication 1000 UNITS: at 09:54

## 2023-02-09 RX ADMIN — CARVEDILOL 12.5 MG: 12.5 TABLET, FILM COATED ORAL at 10:10

## 2023-02-09 RX ADMIN — BUSPIRONE HYDROCHLORIDE 5 MG: 5 TABLET ORAL at 09:54

## 2023-02-09 RX ADMIN — MULTIPLE VITAMINS W/ MINERALS TAB 1 TABLET: TAB at 09:54

## 2023-02-09 NOTE — PROGRESS NOTES
Continued Stay Note  [facility]     Patient Name: Gita Avelar  MRN: [id number]  Today's Date: 2/9/2023    Admit Date: 2/5/2023    Plan: [facility] rehab   Discharge Plan     Row Name 02/09/23 1028       Plan    Plan [facility] skilled rehab    Plan Comments Per [place] with [facility], bed available and ready for patient at [facility] rehab..    Row Name 02/09/23 0850       Plan    Plan [facility]    Plan Comments Msg sent to [place] with [facility] regarding DC today to [facility].               Discharge Codes    No documentation.               Expected Discharge Date and Time     Expected Discharge Date Expected Discharge Time    Feb 9, 2023             Rea Bingham RN

## 2023-02-09 NOTE — PROGRESS NOTES
Continued Stay Note  [facility]     Patient Name: Gita Avelar  MRN: [id number]  Today's Date: 2/9/2023    Admit Date: 2/5/2023    Plan: [facility]   Discharge Plan     Row Name 02/09/23 0850       Plan    Plan [facility]    Plan Comments Msg sent to [place] with [facility] regarding DC today to [facility].               Discharge Codes    No documentation.               Expected Discharge Date and Time     Expected Discharge Date Expected Discharge Time    Feb 9, 2023             Rea Bingham RN

## 2023-02-09 NOTE — PROGRESS NOTES
Continued Stay Note  [facility]     Patient Name: Gita Avelar  MRN: [id number]  Today's Date: 2/9/2023    Admit Date: 2/5/2023    [facility]   Discharge Plan     Row Name 02/09/23 1420       Plan    Plan [facility] re[facility]    Plan Comments Discharge summary faxed. Packet given to RN. Son at bedside to transport.               Discharge Codes    No documentation.               Expected Discharge Date and Time     Expected Discharge Date Expected Discharge Time    Feb 9, 2023             Rea Bingham RN

## 2023-02-09 NOTE — PLAN OF CARE
Goal Outcome Evaluation:              Outcome Evaluation: Pt remains in Covid isolation but today is day 11, patient had 1 BM late yesterday afternoon, voiding, ambulated to BR w assist x 1 and gait belt. Falls precautions maintained, accumax on bed, pt refusing IV access, possible d/c to [facility] today, PO abx continued.

## 2023-02-09 NOTE — PROGRESS NOTES
Case Management Discharge Note      Final Note: Discharged to [facility] skilled rehab. Rea Bingham RN         Selected Continued Care - Discharged on 2/9/2023 Admission date: 2/5/2023 - Discharge disposition: Skilled Nursing Facility (DC - External)    Destination Coordination complete.    Service Provider Selected Services Address Phone Fax Patient Preferred    [facility] [address] [phone] --             Community & DME    No services have been selected for the patient.                Selected Continued Care - Prior Encounters Includes continued care and service providers with selected services from prior encounters from 11/7/2022 to 2/9/2023    Discharged on 2/2/2023 Admission date: 1/29/2023 - Discharge disposition: Home or Self Care    Home Medical Care     Service Provider Selected Services Address Phone Fax Patient Preferred    [facility] [address] [address] [phone] --                    Transportation Services  Private: Car    Final Discharge Disposition Code: 03 - skilled nursing facility (SNF)

## 2023-02-09 NOTE — DISCHARGE SUMMARY
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        Hospital Course     Ms. Avelar is a 78 y.o. female with a history of recent COVID-19 treated with remdesivir and steroids infection who presented to [facility] initially complaining of weakness.  Please see the admitting history and physical for further details.  She was found to have weakness and was admitted to the hospital for further evaluation and treatment.  She was admitted to the hospital she already completed her treatment for COVID-19 on the last hospitalization in May taper off of steroids.  She was tolerating room air on admission.  Was felt that her weakness was probably related to viral illness and deconditioning.  An MRI of the brain showed some old lacunar infarcts but no acute significant findings.  She was continued on aspirin and statin therapy.  Neurology was consulted due to some dysautonomia and felt this was related to COVID-19.  She did have carotid Dopplers done that showed no significant findings.  Physical therapy and Occupational Therapy both evaluated the patient and recommended skilled nursing  facility.  Blood pressure remained acceptable on current regimen with carvedilol and lisinopril.  She was continued on Keflex per recommendations from infectious disease for her strep infection on the last hospitalization.  At this point she is stabilized and continues to do well each day with therapy plan is to discharge to skilled nursing facility for ongoing care and evaluation.  Plan was discussed with the patient the bedside all questions addressed and answered        Day of Discharge     Subjective:  She is feeling better today.  I observed her ambulating to the bathroom without any assistance.  She feels her weakness is improving each day.    Physical Exam:  Temp:  [97.3 °F (36.3 °C)-98.1 °F (36.7 °C)] 97.3 °F (36.3 °C)  Heart Rate:  [64-70] 70  Resp:  [16] 16  BP: (106-144)/(58-88) 144/80  Body mass index is 23.5 kg/m².  Physical Exam  Vitals and nursing note reviewed.   Constitutional:       Appearance: Normal appearance.   Cardiovascular:      Rate and Rhythm: Normal rate and regular rhythm.   Pulmonary:      Effort: No respiratory distress.      Breath sounds: Normal breath sounds.   Abdominal:      General: Bowel sounds are normal.      Palpations: Abdomen is soft.   Skin:     General: Skin is warm and dry.   Neurological:      Mental Status: She is alert.         Consultants     Consult Orders (all) (From admission, onward)     Start     Ordered    02/05/23 2042  Inpatient Neurology Consult General  Once        Specialty:  Neurology  Provider:  Rosendo Mccarthy MD    02/05/23 2041 02/05/23 1826  [facility] (on-call MD unless specified) Details  Once        Specialty:  Hospitalist  Provider:  Za Jacobson MD    02/05/23 1825              Procedures     * Surgery not found *      Imaging Results (All)     Procedure Component Value Units Date/Time    MRI Brain Without Contrast [[id number]] Collected: 02/07/23 0739     Updated: 02/07/23 1724    Narrative:      MRI OF THE BRAIN WITHOUT CONTRAST ON  02/07/2023     CLINICAL HISTORY: Patient has dizziness, 2 recent falls, generalized  weakness.     TECHNIQUE: Axial T1, FLAIR, fat-suppressed T2, axial diffusion and  gradient echo T2 sagittal T1 weighted images were obtained of the entire  head     There are no prior MRIs of the brain for comparison. This is correlated  to a noncontrast head CT yesterday morning from 02/06/2023 at 11:38 AM.     FINDINGS: There are extensive patchy and confluent areas of T2 high  signal extending from the periventricular into the subcortical white  matter of the cerebral hemispheres consistent with moderate to severe  small vessel disease. There is a 5 x 6 mm old lacunar infarct in the  central left thalamus and a 3 mm old lacunar infarct in the anterior  right thalamus. There is a 7 x 4 mm old lacunar infarct extending from  the body of left caudate nucleus into the anterior left corona radiata  region. There is a rounded 3 mm faint diffusion hyperintense focus in  the anterior body of the right caudate nucleus seen on axial diffusion  weighted image 16 sequence 4 that is a nonspecific finding. Its too  small to characterize on the ADC maps. This either represents T2 shine  through artifact or could potentially be a very tiny 3 mm acute to  subacute lacunar type infarct. The remainder of the brain parenchyma is  normal in signal intensity. Specifically no diffusion-weighted  abnormality is seen with no acute infarct identified. On the gradient  echo T2 weighted images no acute or old blood breakdown products are  seen intracranially. There is mild cerebral atrophy. The ventricles are  normal in size. I see no mass effect. There is no midline shift. No  extra axial fluid collections are identified. There are moderate  bilateral ethmoid and mild superior and inferior bilateral maxillary  sinus mucosal thickening. Mastoid and middle ear cavities are clear.  Good flow voids are demonstrated within the cerebral vessels and in the  dural  venous sinuses. The calvarium and skull base demonstrate normal  marrow signal intensity. The orbits are unremarkable. There is some  minimal edema in the scalp overlying the lateral left parietal bone  likely scalp edema from recent trauma.       Impression:      1. There is a 3 mm faint rounded area of diffusion hyperintensity in the  anterior body of the right caudate nucleus seen on axial diffusion  weighted image 16 sequence 4 that is too small to adequately  characterize on the ADC maps and is either T2 shine through artifact or  could potentially be a very tiny acute to subacute lacunar type infarct.  Otherwise, no acute intracranial abnormality is identified.  2. There is extensive confluent T2 high signal throughout the cerebral  white matter and patchy T2 high signal in central pontine white matter  most consistent with moderate to severe small vessel disease. There is 3  mm old lacunar infarct in the inferior right thalamus and a 3 x 4 mm old  lacunar infarct in the anterior right thalamus is 6 x 5 mm old lacunar  infarct in the central left thalamus. A 7 x 4 mm old lacunar infarct  extending from the body of left caudate nucleus into the anterior left  corona radiata region. There is a very tiny 5 x 3 mm old superior left  cerebellar infarct.  3. There is moderate mucosal thickening in the ethmoid and maxillary  sinuses bilaterally. There is some focal scalp edema overlying the  superior lateral left parietal bone likely edema in the scalp from  recent head trauma. Correlate clinically. The remainder of MRI of the  brain is normal. Specifically, no signal abnormality is seen at the  level of the 3 mm hyperdense focus within the cortex of the left  precentral gyrus on yesterday's head CT and therefore this was most  likely an artifact. The results were discussed with Dr. Briones by  telephone on 02/07/2023 at 5:00 PM     This report was finalized on 2/7/2023 5:21 PM by Dr. Mark Summers M.D.       CT Head  Without Contrast [[id number]] Collected: 02/06/23 1211     Updated: 02/06/23 1218    Narrative:      CT HEAD WITHOUT CONTRAST     CLINICAL HISTORY: Fall with dizziness.     TECHNIQUE: CT scan of the head was obtained with 3 mm axial soft tissue  and 2 mm axial bone algorithm algorithm images. No intravenous contrast  was administered. Sagittal and coronal reconstructions were obtained.     COMPARISON: No previous similar studies are available for comparison.     FINDINGS:     There is a punctate hyperdensity within the left postcentral gyrus which  measures up to approximately 3 mm in diameter. This is best seen on  axial image #38. The etiology and significance of this finding is  unclear. Differential diagnostic possibilities include a punctate focus  of acute hemorrhage, mineralization, or hemosiderin deposition. It is  also possible that this simply represents artifact. I recommend further  evaluation with MR imaging.     The ventricles, sulci, and cisterns are age appropriate. There is no  evidence for calvarial fracture. There is no evidence for an acute  extra-axial hemorrhage. There are moderate changes of chronic small  vessel ischemic phenomena. Old lacunar disease is seen within the  thalami and the caudates. Atherosclerotic calcifications are  incidentally appreciated within the intracranial vasculature.     Mucosal thickening is incidentally appreciated within the maxillary  sinuses and sphenoid sinus. Moderate patchy opacification is seen within  the ethmoid air cells.       Impression:         There is a 3 mm focus of hyperdensity within the cortex of the left  postcentral gyrus that is best seen on axial image #38. The etiology and  significance of this finding is unclear. Differential diagnostic  possibilities include a punctate focus of mineralization, hemorrhage, or  hemosiderin deposition. It is also possible that this simply represents  artifact. I recommend further evaluation with MR imaging.  Otherwise,  there is no evidence for acute traumatic intracranial pathology.     Moderate changes of chronic small vessel ischemic phenomena and old  lacunar disease are noted.     Incidental changes of inflammatory paranasal sinus disease as discussed  above.     These findings and recommendations were discussed with DARLENE Duong, on 02/06/2023 at approximately 12:08 PM.        Radiation dose reduction techniques were utilized, including automated  exposure control and exposure modulation based on body size.     This report was finalized on 2/6/2023 12:15 PM by Dr. Jack Butt M.D.       XR Chest 1 View [[id number]] Collected: 02/05/23 1803     Updated: 02/05/23 1906    Narrative:      STAT PORTABLE RADIOGRAPHIC VIEW OF THE CHEST     CLINICAL HISTORY: Shortness of breath.     FINDINGS:     The cardiomediastinal silhouette is mildly enlarged but stable in size.  There is particular prominence of the size of the aortic arch which I  suspect is aneurysmally dilated. This could be further characterized  with a CT scan of the chest as clinically indicated. The aortic arch has  a similar appearance when compared to the prior study dated 01/29/2023  but prominently increased in size when compared to the prior study dated  11/29/2010. Otherwise, the lungs are clear of acute infiltrates. The  osseous structures are incidentally notable for prominent osteoarthritic  changes within the shoulders.     These findings and recommendations were discussed with Dr. Krueger on  02/05/2023 at approximately 5:39 PM.     This report was finalized on 2/5/2023 7:03 PM by Dr. Jack Butt M.D.           Results for orders placed during the hospital encounter of 02/05/23    Duplex Carotid Ultrasound CAR    Interpretation Summary  •  Right internal carotid artery plaque without significant stenosis.  •  Left internal carotid artery plaque without significant stenosis.    Results for orders placed in visit on 01/08/15    SCANNED -  ECHOCARDIOGRAM    Pertinent Labs     Results from last 7 days   Lab Units 02/08/23  0524 02/07/23  0459 02/06/23  0504 02/05/23  1744   WBC 10*3/mm3 7.23 6.99 6.42 9.15   HEMOGLOBIN g/dL 11.8* 11.9* 11.6* 14.0   PLATELETS 10*3/mm3 196 185 203 255     Results from last 7 days   Lab Units 02/08/23  0907 02/08/23  0038 02/07/23  0500 02/06/23  0504 02/05/23  1744   SODIUM mmol/L 139  --  140 140 137   POTASSIUM mmol/L 4.8 4.3 3.4* 3.5 3.7   CHLORIDE mmol/L 109*  --  107 107 103   CO2 mmol/L 27.0  --  30.0* 29.9* 28.7   BUN mg/dL 15  --  17 21 25*   CREATININE mg/dL 0.78  --  0.75 0.66 0.83   GLUCOSE mg/dL 84  --  96 86 106*   EGFR mL/min/1.73 77.9  --  81.6 89.9 72.3     Results from last 7 days   Lab Units 02/07/23  0500 02/05/23  1744   ALBUMIN g/dL 3.0* 3.4*   BILIRUBIN mg/dL 0.6 0.6   ALK PHOS U/L 69 76   AST (SGOT) U/L 13 18   ALT (SGPT) U/L 8 12     Results from last 7 days   Lab Units 02/08/23  0907 02/07/23  0500 02/06/23  0504 02/05/23  1744   CALCIUM mg/dL 8.8 8.5* 8.7 9.3   ALBUMIN g/dL  --  3.0*  --  3.4*       Results from last 7 days   Lab Units 02/05/23  2108 02/05/23  1744   CK TOTAL U/L  --  30   TROPONIN T ng/mL <0.010 <0.010           Invalid input(s): LDLCALC  Results from last 7 days   Lab Units 02/05/23  2109 02/05/23  2108   BLOODCX  No growth at 3 days No growth at 3 days     Results from last 7 days   Lab Units 02/05/23  1758   COVID19  Detected*       Test Results Pending at Discharge     Pending Labs     Order Current Status    Blood Culture - Blood, Arm, Right Preliminary result    Blood Culture - Blood, Hand, Left Preliminary result          Discharge Details        Discharge Medications      New Medications      Instructions Start Date   guaiFENesin 600 MG 12 hr tablet  Commonly known as: MUCINEX   600 mg, Oral, Every 12 Hours Scheduled      polyethylene glycol 17 g packet  Commonly known as: MIRALAX   17 g, Oral, Daily      sennosides-docusate 8.6-50 MG per tablet  Commonly known as:  PERICOLACE   2 tablets, Oral, Nightly         Changes to Medications      Instructions Start Date   cephalexin 500 MG capsule  Commonly known as: KEFLEX  What changed: when to take this   1,000 mg, Oral, Every 8 Hours Scheduled         Continue These Medications      Instructions Start Date   acetaminophen 500 MG tablet  Commonly known as: TYLENOL   1,000 mg, Oral, Every 6 Hours PRN      aspirin 81 MG tablet   81 mg, Oral, Daily      benzonatate 100 MG capsule  Commonly known as: TESSALON   100 mg, Oral, 3 Times Daily PRN      bisacodyl 5 MG EC tablet  Commonly known as: DULCOLAX   5 mg, Oral, Daily PRN      busPIRone 5 MG tablet  Commonly known as: BUSPAR   5 mg, Oral, 3 Times Daily      carvedilol 12.5 MG tablet  Commonly known as: COREG   12.5 mg, Oral, 2 Times Daily With Meals      cholecalciferol 25 MCG (1000 UT) tablet  Commonly known as: VITAMIN D3   1,000 Units, Oral, Daily      DULoxetine 60 MG capsule  Commonly known as: CYMBALTA   60 mg, Oral, Daily, Needs FU      lisinopril 40 MG tablet  Commonly known as: PRINIVIL,ZESTRIL   40 mg, Oral, Daily      multivitamin with minerals tablet tablet   1 tablet, Oral, Daily      rosuvastatin 20 MG tablet  Commonly known as: Crestor   20 mg, Oral, Nightly         Stop These Medications    meclizine 25 MG tablet  Commonly known as: ANTIVERT            No Known Allergies    Discharge Disposition:  Skilled Nursing Facility (DC - External)      Discharge Diet:  Diet Order   Procedures   • Diet: Regular/House Diet; Texture: Regular Texture (IDDSI 7); Fluid Consistency: Thin (IDDSI 0)       Discharge Activity:   Activity Instructions     Activity as Tolerated            CODE STATUS:    Code Status and Medical Interventions:   Ordered at: 02/05/23 1925     Code Status (Patient has no pulse and is not breathing):    CPR (Attempt to Resuscitate)     Medical Interventions (Patient has pulse or is breathing):    Full Support       Future Appointments   Date Time Provider  Department Center   2/13/2023 To Be Determined Radha June, PT [facility] None     Additional Instructions for the Follow-ups that You Need to Schedule     Discharge Follow-up with PCP   As directed       Currently Documented PCP:    Ernie Wright APRN    PCP Phone Number:    [phone]     Follow Up Details: 2-3 weeks         Discharge Follow-up with Specified Provider: Neurology; 6 Weeks   As directed      To: Neurology    Follow Up: 6 Weeks            Contact information for follow-up providers     Ernie Wright APRN .    Specialties: Family Medicine, Nurse Practitioner  Contact information:  [address] [address] [phone]                   Contact information for after-discharge care     Destination     [facility] .    Service: Skilled Nursing  Contact information:  [address] [facility] [address] [phone]                             Additional Instructions for the Follow-ups that You Need to Schedule     Discharge Follow-up with PCP   As directed       Currently Documented PCP:    Ernie Wright APRN    PCP Phone Number:    [phone]     Follow Up Details: 2-3 weeks         Discharge Follow-up with Specified Provider: Neurology; 6 Weeks   As directed      To: Neurology    Follow Up: 6 Weeks           Time Spent on Discharge:  Greater than 30 minutes      Ephraim Briones MD  [facility]  02/09/23  09:17 EST

## 2023-02-10 LAB
BACTERIA SPEC AEROBE CULT: NORMAL
BACTERIA SPEC AEROBE CULT: NORMAL

## 2023-02-22 ENCOUNTER — TRANSCRIBE ORDERS (OUTPATIENT)
Dept: HOME HEALTH SERVICES | Facility: HOME HEALTHCARE | Age: 78
End: 2023-02-22
Payer: MEDICARE

## 2023-02-22 DIAGNOSIS — D89.831 CYTOKINE RELEASE SYNDROME, GRADE 1: Primary | ICD-10-CM

## 2023-02-23 ENCOUNTER — HOME CARE VISIT (OUTPATIENT)
Dept: HOME HEALTH SERVICES | Facility: HOME HEALTHCARE | Age: 78
End: 2023-02-23
Payer: MEDICARE

## 2023-02-23 PROCEDURE — G0493 RN CARE EA 15 MIN HH/HOSPICE: HCPCS

## 2023-02-26 VITALS
HEART RATE: 71 BPM | RESPIRATION RATE: 18 BRPM | SYSTOLIC BLOOD PRESSURE: 120 MMHG | OXYGEN SATURATION: 97 % | DIASTOLIC BLOOD PRESSURE: 76 MMHG

## 2023-02-26 NOTE — HOME HEALTH
MAGALY following recent rehospitalization due to muscle weakness following covid.  VSS.  Using rollator.  Lives at home alone, with daughter who assists occassionally.  Medications reconciled.  SN to T/I HTN and medication regime.

## 2023-02-27 ENCOUNTER — HOME CARE VISIT (OUTPATIENT)
Dept: HOME HEALTH SERVICES | Facility: HOME HEALTHCARE | Age: 78
End: 2023-02-27
Payer: MEDICARE

## 2023-02-27 VITALS — DIASTOLIC BLOOD PRESSURE: 80 MMHG | HEART RATE: 65 BPM | SYSTOLIC BLOOD PRESSURE: 130 MMHG | OXYGEN SATURATION: 96 %

## 2023-02-27 VITALS
SYSTOLIC BLOOD PRESSURE: 138 MMHG | TEMPERATURE: 97.5 F | OXYGEN SATURATION: 97 % | HEART RATE: 63 BPM | RESPIRATION RATE: 18 BRPM | DIASTOLIC BLOOD PRESSURE: 80 MMHG

## 2023-02-27 PROCEDURE — G0151 HHCP-SERV OF PT,EA 15 MIN: HCPCS

## 2023-02-27 PROCEDURE — G0152 HHCP-SERV OF OT,EA 15 MIN: HCPCS

## 2023-02-27 NOTE — HOME HEALTH
Pt answered door for therapist with her rollator.  She reports she feels tired and worn out.  She reports her energy is pretty low.    No falls in the last week.  But has 3 falls since initially diagnosed with COVID.    Lives alone in a 1 story apt and 2 of her sons live nearby.  She reports Shantelle her DIL helps with groceries/meds.  Pt does not drive.  Prior to hospital stay she used rollator occassionally, especially to mailbox.  Other than that she used a cane.    She reports she has a bad back, and it hurts constantly.  But today has been pretty good and does not complain of pain today.  PMHX: COVID 19, HTN, osteoporosis, HLD, R hip fx and s/p replacement 3 years ago.    Pt was able to amb for 1 minute and 55 seconds today before fatigue. She tolerated 3 standing exercises at the kitchen sink.   TUG 20 seconds.  Skilled PT necessary to instruct pt in HEP, optimize her functional capacity and stamina, and decrease her risk for falls.      Plan for next visit: Progress walking duratino to 3 minutes, review and progress HEP as tolerated, if weather appropriate try to walk to mailbox.

## 2023-02-28 ENCOUNTER — HOME CARE VISIT (OUTPATIENT)
Dept: HOME HEALTH SERVICES | Facility: HOME HEALTHCARE | Age: 78
End: 2023-02-28
Payer: MEDICARE

## 2023-02-28 VITALS
SYSTOLIC BLOOD PRESSURE: 120 MMHG | RESPIRATION RATE: 16 BRPM | DIASTOLIC BLOOD PRESSURE: 70 MMHG | HEART RATE: 75 BPM | OXYGEN SATURATION: 98 %

## 2023-02-28 PROCEDURE — G0493 RN CARE EA 15 MIN HH/HOSPICE: HCPCS

## 2023-03-01 ENCOUNTER — HOME CARE VISIT (OUTPATIENT)
Dept: HOME HEALTH SERVICES | Facility: HOME HEALTHCARE | Age: 78
End: 2023-03-01
Payer: MEDICARE

## 2023-03-01 ENCOUNTER — TELEPHONE (OUTPATIENT)
Dept: FAMILY MEDICINE CLINIC | Facility: CLINIC | Age: 78
End: 2023-03-01
Payer: MEDICARE

## 2023-03-01 VITALS — OXYGEN SATURATION: 98 % | DIASTOLIC BLOOD PRESSURE: 100 MMHG | HEART RATE: 57 BPM | SYSTOLIC BLOOD PRESSURE: 160 MMHG

## 2023-03-01 DIAGNOSIS — I10 ESSENTIAL HYPERTENSION: Primary | ICD-10-CM

## 2023-03-01 PROCEDURE — G0151 HHCP-SERV OF PT,EA 15 MIN: HCPCS

## 2023-03-01 RX ORDER — ROSUVASTATIN CALCIUM 20 MG/1
20 TABLET, COATED ORAL NIGHTLY
Qty: 90 TABLET | Refills: 1 | Status: SHIPPED | OUTPATIENT
Start: 2023-03-01

## 2023-03-01 RX ORDER — AMLODIPINE BESYLATE 5 MG/1
5 TABLET ORAL DAILY
Qty: 60 TABLET | Refills: 0 | Status: SHIPPED | OUTPATIENT
Start: 2023-03-01

## 2023-03-01 NOTE — TELEPHONE ENCOUNTER
Radha from [facility], [place], operated by [facility] called to report that patient has a high B/P, while I was on phone with her it was 190/100 and went down to 150/100. She is on coreg and lisinopril. Patient has no symptoms other then feeling fatigue. HH wants to know what she can do. Please advise

## 2023-03-01 NOTE — TELEPHONE ENCOUNTER
Caller: CHARITY SUNG    Relationship: Emergency Contact    Best call back number: [phone]  OR CALL THE PATIENT -[phone]    Requested Prescriptions:   Requested Prescriptions     Pending Prescriptions Disp Refills   • rosuvastatin (Crestor) 20 MG tablet 90 tablet 1     Sig: Take 1 tablet by mouth Every Night. Indications: High Amount of Fats in the Blood        Pharmacy where request should be sent: [facility] [address] [place] [address] - [phone] [facility] [phone]      Additional details provided by patient:  PATIENT'S DAUGHTER STATES THAT THIS MEDICATION WAS . PLEASE ADVISE, PATIENT IS ALMOST OUT OF THIS MEDICATION. SHE NEEDS THIS IN THE NEXT TWO TO THREE DAYS.     Does the patient have less than a 3 day supply:  [] Yes  [x] No    Would you like a call back once the refill request has been completed: [] Yes [x] No    If the office needs to give you a call back, can they leave a voicemail: [] Yes [x] No    Holger Chavez Rep   23 13:49 EST

## 2023-03-01 NOTE — HOME HEALTH
"Pt reports she is slow today, nausea and dizziness earlier today, but better now.  She was drinking water.  She reports when she rests it goes away.    No falls and no pain today (\"other than my back, thats just on going\").    No questions re medications.    BP elevated 160/100, and she has taken all of her AM meds.  Re-checked a few minutes later and it was 180/100.  She reports being constipated doesn't help.  Overall she reports just tired and anxious.   Call placed to PCP office to inform of above findings.  Spoke to Phil KHAN.  Plan for pt to continue Lisinopril, Coreg, and add one new BP medication.  Also, MD wants to make sure shes is taking Buspiron. Call also placed to DIL.  Recommend she obtain an upper arm BP cuff.  Also, recommend family call pt to remind her to take meds at appropriate times or set alarms on her phone for med reminders.  Informed RN and OT.  She may benefit from ST for cognition if this medication issue is related to cognition.      Due to elevated BP, anxiety, overall feeling tired, encouraged pt to rest today and resume HEP once BP decreases and she feels better."

## 2023-03-02 ENCOUNTER — HOME CARE VISIT (OUTPATIENT)
Dept: HOME HEALTH SERVICES | Facility: HOME HEALTHCARE | Age: 78
End: 2023-03-02
Payer: MEDICARE

## 2023-03-02 VITALS
DIASTOLIC BLOOD PRESSURE: 90 MMHG | SYSTOLIC BLOOD PRESSURE: 160 MMHG | OXYGEN SATURATION: 95 % | HEART RATE: 56 BPM | RESPIRATION RATE: 16 BRPM

## 2023-03-02 VITALS
OXYGEN SATURATION: 97 % | HEART RATE: 85 BPM | DIASTOLIC BLOOD PRESSURE: 90 MMHG | TEMPERATURE: 97.3 F | SYSTOLIC BLOOD PRESSURE: 140 MMHG | RESPIRATION RATE: 18 BRPM

## 2023-03-02 PROCEDURE — G0152 HHCP-SERV OF OT,EA 15 MIN: HCPCS

## 2023-03-02 PROCEDURE — G0299 HHS/HOSPICE OF RN EA 15 MIN: HCPCS

## 2023-03-02 NOTE — HOME HEALTH
Patient new on amlodipine 5mg daily.  T/I amlodipine.  BP still elevated, but has only had first dose this am.  Reviewed medications and mediplanner.  Pt taking as ordered.  Pt is taking BP several times a day as well.  Will continue to  monitor.

## 2023-03-02 NOTE — HOME HEALTH
"SUBJECTIVE: \"I have new medication for blood pressure and I have a cuff so I can take my own. The last reading 130/72.\"  FALLS: none reported.  MEDICATION CHANGES: med profile already updated.  PLAN FOR NEXT VISIT: shower assist or expand BUE AROM HEP w/resistive band or kitchen tasks with EC/WS"

## 2023-03-03 PROCEDURE — G0180 MD CERTIFICATION HHA PATIENT: HCPCS

## 2023-03-06 ENCOUNTER — HOME CARE VISIT (OUTPATIENT)
Dept: HOME HEALTH SERVICES | Facility: HOME HEALTHCARE | Age: 78
End: 2023-03-06
Payer: MEDICARE

## 2023-03-06 VITALS
OXYGEN SATURATION: 96 % | TEMPERATURE: 97.5 F | HEART RATE: 96 BPM | RESPIRATION RATE: 21 BRPM | DIASTOLIC BLOOD PRESSURE: 84 MMHG | SYSTOLIC BLOOD PRESSURE: 136 MMHG

## 2023-03-06 PROCEDURE — G0152 HHCP-SERV OF OT,EA 15 MIN: HCPCS

## 2023-03-06 NOTE — HOME HEALTH
"SUBJECTIVE: \"I took a shower yesterday. I did good with it and it wasn't any trouble.\" \"I'm having nighttime hallucinations. Last night I had a baby in bed w/me. I was trying to keep it warm and I felt like I was really patting its bottom. Then all of a sudden, it was gone. That really scares me. I don't like my mind playing tricks on me.\" (I informed SN about this comment.)  FALLS: none reported.  MEDICATION CHANGES: none reported.  PLAN FOR NEXT VISIT: OT d/c if all goals met. Endurance activity, IADL in kitchen for simple meal prep."

## 2023-03-07 ENCOUNTER — HOME CARE VISIT (OUTPATIENT)
Dept: HOME HEALTH SERVICES | Facility: HOME HEALTHCARE | Age: 78
End: 2023-03-07
Payer: MEDICARE

## 2023-03-07 VITALS
SYSTOLIC BLOOD PRESSURE: 130 MMHG | RESPIRATION RATE: 16 BRPM | DIASTOLIC BLOOD PRESSURE: 80 MMHG | OXYGEN SATURATION: 94 % | HEART RATE: 75 BPM

## 2023-03-07 VITALS
OXYGEN SATURATION: 96 % | SYSTOLIC BLOOD PRESSURE: 120 MMHG | HEART RATE: 54 BPM | DIASTOLIC BLOOD PRESSURE: 78 MMHG | RESPIRATION RATE: 16 BRPM

## 2023-03-07 PROCEDURE — G0151 HHCP-SERV OF PT,EA 15 MIN: HCPCS

## 2023-03-07 PROCEDURE — G0493 RN CARE EA 15 MIN HH/HOSPICE: HCPCS

## 2023-03-07 NOTE — HOME HEALTH
Pt reports she is sore and tired from taking a long walk with OT yesterday.   No falls  No pain, but just regular back aches that Tylenol stops.    Pt tolerated walking with rollator to the mailbox and back with supervision.  But she exhibits decreased B step length, and decreased B foot clearance.  She also tolerated standing exercises at the kitchen counter, but requires min cues for form.  She exhibited unsteadiness with backward walking balance exercise along the counter.      Plan for next visit: TUG, PT DC, ensure I with ambulation to mailbox and HEP.

## 2023-03-07 NOTE — HOME HEALTH
Patient sitting up on sofa.  BP improved with addition of amlodipine.  Patient reports has had no more hallucinations.  AAOx4.  T/I amlodipine indications, dosing instructions, and s/e.  Will continue to monitor.

## 2023-03-09 ENCOUNTER — HOME CARE VISIT (OUTPATIENT)
Dept: HOME HEALTH SERVICES | Facility: HOME HEALTHCARE | Age: 78
End: 2023-03-09
Payer: MEDICARE

## 2023-03-09 VITALS — OXYGEN SATURATION: 94 % | DIASTOLIC BLOOD PRESSURE: 70 MMHG | SYSTOLIC BLOOD PRESSURE: 130 MMHG | HEART RATE: 65 BPM

## 2023-03-09 VITALS
SYSTOLIC BLOOD PRESSURE: 132 MMHG | OXYGEN SATURATION: 97 % | RESPIRATION RATE: 16 BRPM | HEART RATE: 76 BPM | DIASTOLIC BLOOD PRESSURE: 80 MMHG | TEMPERATURE: 97.4 F

## 2023-03-09 VITALS
RESPIRATION RATE: 16 BRPM | HEART RATE: 65 BPM | TEMPERATURE: 98.2 F | OXYGEN SATURATION: 95 % | SYSTOLIC BLOOD PRESSURE: 130 MMHG | DIASTOLIC BLOOD PRESSURE: 82 MMHG

## 2023-03-09 PROCEDURE — G0152 HHCP-SERV OF OT,EA 15 MIN: HCPCS

## 2023-03-09 PROCEDURE — G0299 HHS/HOSPICE OF RN EA 15 MIN: HCPCS

## 2023-03-09 PROCEDURE — G0151 HHCP-SERV OF PT,EA 15 MIN: HCPCS

## 2023-03-09 NOTE — HOME HEALTH
Sitting up on couch, with heating pad to back.  Reports soreness due to therapy.  T/I pain reduction techniques.  VSS.

## 2023-03-10 NOTE — HOME HEALTH
At the start of PT session pt reported she was very sore in low back and hips.  But after 2 bouts of ambulation and instruction in SKTC she reported relief with pain down to 2-3/10.   She was able to amb with rollator to the garbage dumpsters and back.   She was able to place the garbage in the dumpster without LOB, and also check her mail.   After that she returned to her unit, rested, participated in exercises, and was able to amb outdoors again with rollator MOD I.   Pt agreeable to PT DC and has met all PT goals.

## 2023-03-10 NOTE — HOME HEALTH
"SUBJECTIVE: Agreeable to OT d/c. \"My back hurts a bit today b/c I'm constipated. Nothing has worked yet, but hopefully soon.  I haven't had anymore hallucinations.\"  FALLS: none reported.  MEDICATION CHANGES: none reported."

## 2023-03-14 ENCOUNTER — HOME CARE VISIT (OUTPATIENT)
Dept: HOME HEALTH SERVICES | Facility: HOME HEALTHCARE | Age: 78
End: 2023-03-14
Payer: MEDICARE

## 2023-03-14 VITALS
RESPIRATION RATE: 18 BRPM | DIASTOLIC BLOOD PRESSURE: 60 MMHG | TEMPERATURE: 98.6 F | HEART RATE: 81 BPM | OXYGEN SATURATION: 98 % | SYSTOLIC BLOOD PRESSURE: 120 MMHG

## 2023-03-14 PROCEDURE — G0300 HHS/HOSPICE OF LPN EA 15 MIN: HCPCS

## 2023-03-15 NOTE — HOME HEALTH
[facility] for cp assessement and t/i on muscle weakness r/t Covid  Patient continues to c/o being fatique and forgetfullness d/t Covid fog  Patient do not want SLP to eval

## 2023-03-21 ENCOUNTER — HOME CARE VISIT (OUTPATIENT)
Dept: HOME HEALTH SERVICES | Facility: HOME HEALTHCARE | Age: 78
End: 2023-03-21
Payer: MEDICARE

## 2023-03-21 PROCEDURE — G0300 HHS/HOSPICE OF LPN EA 15 MIN: HCPCS

## 2023-03-22 VITALS
SYSTOLIC BLOOD PRESSURE: 140 MMHG | TEMPERATURE: 97.3 F | HEART RATE: 57 BPM | OXYGEN SATURATION: 97 % | DIASTOLIC BLOOD PRESSURE: 80 MMHG | RESPIRATION RATE: 18 BRPM

## 2023-03-23 NOTE — HOME HEALTH
[facility] for cp assessement and t/i on muscle weakness r/t Covid  Patient continue to be fatique but overall doing well and ready for discharge  Patient has met all skilled nurse goals and agreeable for discharge next visit

## 2023-03-28 ENCOUNTER — OFFICE VISIT (OUTPATIENT)
Dept: FAMILY MEDICINE CLINIC | Facility: CLINIC | Age: 78
End: 2023-03-28
Payer: MEDICARE

## 2023-03-28 VITALS
HEIGHT: 64 IN | HEART RATE: 71 BPM | SYSTOLIC BLOOD PRESSURE: 130 MMHG | DIASTOLIC BLOOD PRESSURE: 90 MMHG | OXYGEN SATURATION: 97 % | WEIGHT: 136 LBS | BODY MASS INDEX: 23.22 KG/M2

## 2023-03-28 DIAGNOSIS — D64.9 NORMOCYTIC ANEMIA: ICD-10-CM

## 2023-03-28 DIAGNOSIS — R42 DIZZINESS: ICD-10-CM

## 2023-03-28 DIAGNOSIS — R53.83 OTHER FATIGUE: ICD-10-CM

## 2023-03-28 DIAGNOSIS — I10 PRIMARY HYPERTENSION: Primary | ICD-10-CM

## 2023-03-28 PROCEDURE — 3075F SYST BP GE 130 - 139MM HG: CPT | Performed by: FAMILY MEDICINE

## 2023-03-28 PROCEDURE — 99214 OFFICE O/P EST MOD 30 MIN: CPT | Performed by: FAMILY MEDICINE

## 2023-03-28 PROCEDURE — 3080F DIAST BP >= 90 MM HG: CPT | Performed by: FAMILY MEDICINE

## 2023-03-28 NOTE — PROGRESS NOTES
Subjective   Gita Avelar is a 78 y.o. female. 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         Hospital Course      Ms. Avelar is a 78 y.o. female with a history of recent COVID-19 treated with remdesivir and steroids infection who presented to [facility] initially complaining of weakness.  Please see the admitting history and physical for further details.  She was found to have weakness and was admitted to the hospital for further evaluation and treatment.  She was admitted to the hospital she already completed her treatment for COVID-19 on the last hospitalization in May taper off of steroids.  She was tolerating room air on admission.  Was felt that her weakness was probably related to viral illness and deconditioning.  An MRI of the brain showed some old lacunar infarcts but no acute significant findings.  She was continued on aspirin and statin therapy.  Neurology was consulted due to some dysautonomia and felt this was related to COVID-19.  She did have carotid Dopplers done that showed no significant findings.  Physical therapy and Occupational Therapy both evaluated the patient and recommended skilled nursing facility.  Blood pressure remained acceptable on  current regimen with carvedilol and lisinopril.  She was continued on Keflex per recommendations from infectious disease for her strep infection on the last hospitalization.  At this point she is stabilized and continues to do well each day with therapy plan is to discharge to skilled nursing facility for ongoing care and evaluation.  Plan was discussed with the patient the bedside all questions addressed and answered       History of Present Illness  marta 77 y/o female with severe covid 19, readmission for severe weakness, dx GBS and had +blood cx;   Finished abx;   Had dysautonomia though 2ndary to covid 19, reports bp staying up but still dizziness;  No syncope or falls;  No cp/soa;  Reports AMS is much improved;  Did have hhc with therapy;  Still feesl weak;   No falls, but unsteady on feet;   Has macular degeneration.    Review of Systems   Constitutional: Positive for fatigue. Negative for fever.   Cardiovascular: Negative for chest pain and palpitations.   Gastrointestinal: Negative for abdominal pain.   Neurological: Positive for weakness. Negative for syncope.       Patient Active Problem List   Diagnosis   • Avitaminosis D   • B12 deficiency   • Near syncope   • Atrophic vaginitis   • Awareness of heartbeats   • OP (osteoporosis)   • HLD (hyperlipidemia)   • Fatigue   • Essential hypertension   • Moderate episode of recurrent major depressive disorder (HCC)   • Nipple discharge   • Abnormal ultrasound of breast   • Abnormal mammogram   • Lump or mass in breast   • Family history of malignant neoplasm of pancreas   • Atherosclerosis of both carotid arteries   • Acute UTI   • COVID-19 virus infection   • Hypoxia   • Streptococcal septicemia (HCC)   • Generalized weakness   • Dizziness on standing   • 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   • History of subdural hematoma   • Dysautonomia (HCC)   • Cytokine release syndrome, grade 1       Social History     Socioeconomic History   • Marital status:     Tobacco Use   • Smoking status: Former     Packs/day: 0.50     Years: 25.00     Pack years: 12.50     Types: Cigarettes     Start date:      Quit date: 2021     Years since quittin.7   • Smokeless tobacco: Never   Vaping Use   • Vaping Use: Never used   Substance and Sexual Activity   • Alcohol use: Yes     Comment: occasionally    • Drug use: No   • Sexual activity: Not Currently     Partners: Male       No Known Allergies    Current Outpatient Medications on File Prior to Visit   Medication Sig Dispense Refill   • acetaminophen (TYLENOL) 500 MG tablet Take 2 tablets by mouth Every 6 (Six) Hours As Needed for Mild Pain. Indications: Pain     • amLODIPine (NORVASC) 5 MG tablet Take 1 tablet by mouth Daily. 60 tablet 0   • aspirin 81 MG tablet Take 1 tablet by mouth Daily. Indications: Disease involving Lipid Deposits in the Arteries     • bisacodyl (DULCOLAX) 5 MG EC tablet Take 1 tablet by mouth Daily As Needed for Constipation. Indications: Constipation     • carvedilol (COREG) 12.5 MG tablet Take 1 tablet by mouth 2 (Two) Times a Day With Meals for 30 days. 60 tablet 0   • cholecalciferol (VITAMIN D3) 1000 UNITS tablet Take 1 tablet by mouth Daily. Indications: Vitamin D Deficiency     • lisinopril (PRINIVIL,ZESTRIL) 40 MG tablet Take 1 tablet by mouth Daily. Indications: High Blood Pressure Disorder     • Multiple Vitamins-Minerals (PRESERVISION AREDS PO) Take 1 tablet by mouth Daily. Indications: Vitamin Deficiency     • rosuvastatin (Crestor) 20 MG tablet Take 1 tablet by mouth Every Night. Indications: High Amount of Fats in the Blood 90 tablet 1   • [DISCONTINUED] busPIRone (BUSPAR) 5 MG tablet Take 1 tablet by mouth 3 (Three) Times a Day. (Patient not taking: Reported on 3/28/2023) 90 tablet 0   • [DISCONTINUED] DULoxetine (CYMBALTA) 60 MG capsule Take 1 capsule by mouth Daily. Needs FU (Patient not taking: Reported on 3/28/2023) 90 capsule 0   • [DISCONTINUED] guaiFENesin (MUCINEX) 600 MG 12  "hr tablet Take 1 tablet by mouth Every 12 (Twelve) Hours. (Patient not taking: Reported on 3/28/2023)     • [DISCONTINUED] polyethylene glycol 17 g packet Take 17 g by mouth Daily. (Patient not taking: Reported on 3/28/2023)     • [DISCONTINUED] sennosides-docusate (PERICOLACE) 8.6-50 MG per tablet Take 2 tablets by mouth Every Night. (Patient not taking: Reported on 3/28/2023)       No current facility-administered medications on file prior to visit.       Objective   Vitals:    03/28/23 1405 03/28/23 1431   BP: 148/90 130/90   Pulse: 71    SpO2: 97%    Weight: 61.7 kg (136 lb)    Height: 162.6 cm (64\")      Body mass index is 23.34 kg/m².    Physical Exam  Vitals and nursing note reviewed.   Constitutional:       Appearance: She is well-developed.   HENT:      Head: Normocephalic and atraumatic.   Neck:      Thyroid: No thyromegaly.      Vascular: No JVD.   Cardiovascular:      Rate and Rhythm: Normal rate and regular rhythm.      Heart sounds: Normal heart sounds. No murmur heard.    No friction rub. No gallop.   Pulmonary:      Effort: Pulmonary effort is normal. No respiratory distress.      Breath sounds: Normal breath sounds. No wheezing or rales.   Abdominal:      General: Bowel sounds are normal. There is no distension.      Palpations: Abdomen is soft.      Tenderness: There is no abdominal tenderness. There is no guarding or rebound.   Musculoskeletal:      Cervical back: Neck supple.   Skin:     General: Skin is warm and dry.   Neurological:      Mental Status: She is alert.   Psychiatric:         Behavior: Behavior normal.       Component      Latest Ref Rng & Units 2/8/2023 2/8/2023 2/8/2023          12:38 AM  5:24 AM  9:07 AM   Glucose      65 - 99 mg/dL   84   BUN      8 - 23 mg/dL   15   Creatinine      0.57 - 1.00 mg/dL   0.78   Sodium      136 - 145 mmol/L   139   Potassium      3.5 - 5.2 mmol/L 4.3  4.8   Chloride      98 - 107 mmol/L   109 (H)   CO2      22.0 - 29.0 mmol/L   27.0   Calcium      " 8.6 - 10.5 mg/dL   8.8   BUN/Creatinine Ratio      7.0 - 25.0   19.2   Anion Gap      5.0 - 15.0 mmol/L   3.0 (L)   eGFR      >60.0 mL/min/1.73   77.9   Hemoglobin A1C      4.80 - 5.60 %  5.70 (H)      Contains abnormal data Blood Culture ID, PCR - Blood, Arm, Left  Order: [id number] - Reflex for Order [id number]   Status: Final result      Visible to patient: No (not released)      Next appt: 03/29/2023 at 07:15 AM in [facility] (Norah Horton RN)     Specimen Information: Arm, Left; Blood    0 Result Notes      Component  Ref Range & Units    BCID, PCR  Negative by BCID PCR. Culture to Follow. Streptococcus spp, not A, B, or pneumoniae. Identification by BCID2 PCR. Abnormal     BOTTLE TYPE Anaerobic Bottle    Resulting Agency [facility]              Specimen Collected: 01/29/23 17:23 EST Last Resulted: 01/30/23 12:00 EST        Order Details      View Encounter      Lab and Collection Details      Routing      Result History     View Encounter Conversation           Current outpatient and discharge medications have been reconciled for the patient.  Reviewed by: Mark Hernández DO    Assessment & Plan   Diagnoses and all orders for this visit:    1. Primary hypertension (Primary)  -     Comprehensive Metabolic Panel    2. Dizziness  -     Comprehensive Metabolic Panel    3. Other fatigue  -     Comprehensive Metabolic Panel  -     CBC & Differential  -     TSH  -     Vitamin B12    4. Normocytic anemia  -     Comprehensive Metabolic Panel  -     CBC & Differential  -     TSH  -     Ferritin  -     Folate  -     CHERYL,PE and FLC, Serum  -     Iron Profile  -     Vitamin B12  -     Reticulocytes    bp near goal and very light headed, will hold off adjusting furhter  Check anemia labs as mild inpatietn and check labs fatigue  Take time getting up.           -Follow up: 3 months and prn

## 2023-03-29 ENCOUNTER — HOME CARE VISIT (OUTPATIENT)
Dept: HOME HEALTH SERVICES | Facility: HOME HEALTHCARE | Age: 78
End: 2023-03-29
Payer: MEDICARE

## 2023-03-29 VITALS
DIASTOLIC BLOOD PRESSURE: 94 MMHG | OXYGEN SATURATION: 94 % | TEMPERATURE: 96.6 F | HEART RATE: 71 BPM | SYSTOLIC BLOOD PRESSURE: 152 MMHG | RESPIRATION RATE: 18 BRPM

## 2023-03-29 LAB
ALBUMIN SERPL ELPH-MCNC: 3.5 G/DL (ref 2.9–4.4)
ALBUMIN SERPL-MCNC: 4.3 G/DL (ref 3.7–4.7)
ALBUMIN/GLOB SERPL: 1.1 {RATIO} (ref 0.7–1.7)
ALBUMIN/GLOB SERPL: 1.7 {RATIO} (ref 1.2–2.2)
ALP SERPL-CCNC: 88 IU/L (ref 44–121)
ALPHA1 GLOB SERPL ELPH-MCNC: 0.3 G/DL (ref 0–0.4)
ALPHA2 GLOB SERPL ELPH-MCNC: 0.9 G/DL (ref 0.4–1)
ALT SERPL-CCNC: 13 IU/L (ref 0–32)
AST SERPL-CCNC: 17 IU/L (ref 0–40)
B-GLOBULIN SERPL ELPH-MCNC: 1.1 G/DL (ref 0.7–1.3)
BASOPHILS # BLD AUTO: 0.1 X10E3/UL (ref 0–0.2)
BASOPHILS NFR BLD AUTO: 1 %
BILIRUB SERPL-MCNC: 0.7 MG/DL (ref 0–1.2)
BUN SERPL-MCNC: 21 MG/DL (ref 8–27)
BUN/CREAT SERPL: 27 (ref 12–28)
CALCIUM SERPL-MCNC: 9.5 MG/DL (ref 8.7–10.3)
CHLORIDE SERPL-SCNC: 106 MMOL/L (ref 96–106)
CO2 SERPL-SCNC: 24 MMOL/L (ref 20–29)
CREAT SERPL-MCNC: 0.79 MG/DL (ref 0.57–1)
EGFRCR SERPLBLD CKD-EPI 2021: 77 ML/MIN/1.73
EOSINOPHIL # BLD AUTO: 0.2 X10E3/UL (ref 0–0.4)
EOSINOPHIL NFR BLD AUTO: 3 %
ERYTHROCYTE [DISTWIDTH] IN BLOOD BY AUTOMATED COUNT: 12.7 % (ref 11.7–15.4)
FERRITIN SERPL-MCNC: 73 NG/ML (ref 15–150)
FOLATE SERPL-MCNC: 12.1 NG/ML
GAMMA GLOB SERPL ELPH-MCNC: 1.1 G/DL (ref 0.4–1.8)
GLOBULIN SER CALC-MCNC: 2.5 G/DL (ref 1.5–4.5)
GLOBULIN SER-MCNC: 3.3 G/DL (ref 2.2–3.9)
GLUCOSE SERPL-MCNC: 86 MG/DL (ref 70–99)
HCT VFR BLD AUTO: 38.7 % (ref 34–46.6)
HGB BLD-MCNC: 13.1 G/DL (ref 11.1–15.9)
IGA SERPL-MCNC: 294 MG/DL (ref 64–422)
IGG SERPL-MCNC: 1105 MG/DL (ref 586–1602)
IGM SERPL-MCNC: 81 MG/DL (ref 26–217)
IMM GRANULOCYTES # BLD AUTO: 0 X10E3/UL (ref 0–0.1)
IMM GRANULOCYTES NFR BLD AUTO: 0 %
INTERPRETATION SERPL IEP-IMP: ABNORMAL
IRON SATN MFR SERPL: 41 % (ref 15–55)
IRON SERPL-MCNC: 118 UG/DL (ref 27–139)
KAPPA LC FREE SER-MCNC: 35.5 MG/L (ref 3.3–19.4)
KAPPA LC FREE/LAMBDA FREE SER: 1.85 {RATIO} (ref 0.26–1.65)
LABORATORY COMMENT REPORT: ABNORMAL
LAMBDA LC FREE SERPL-MCNC: 19.2 MG/L (ref 5.7–26.3)
LYMPHOCYTES # BLD AUTO: 2.1 X10E3/UL (ref 0.7–3.1)
LYMPHOCYTES NFR BLD AUTO: 35 %
M PROTEIN SERPL ELPH-MCNC: ABNORMAL G/DL
MCH RBC QN AUTO: 30.9 PG (ref 26.6–33)
MCHC RBC AUTO-ENTMCNC: 33.9 G/DL (ref 31.5–35.7)
MCV RBC AUTO: 91 FL (ref 79–97)
MONOCYTES # BLD AUTO: 0.6 X10E3/UL (ref 0.1–0.9)
MONOCYTES NFR BLD AUTO: 9 %
NEUTROPHILS # BLD AUTO: 3.1 X10E3/UL (ref 1.4–7)
NEUTROPHILS NFR BLD AUTO: 52 %
PLATELET # BLD AUTO: 224 X10E3/UL (ref 150–450)
POTASSIUM SERPL-SCNC: 4.4 MMOL/L (ref 3.5–5.2)
PROT SERPL-MCNC: 6.8 G/DL (ref 6–8.5)
RBC # BLD AUTO: 4.24 X10E6/UL (ref 3.77–5.28)
RETICS/RBC NFR AUTO: 1.1 % (ref 0.6–2.6)
SODIUM SERPL-SCNC: 142 MMOL/L (ref 134–144)
TIBC SERPL-MCNC: 288 UG/DL (ref 250–450)
TSH SERPL DL<=0.005 MIU/L-ACNC: 3.01 UIU/ML (ref 0.45–4.5)
UIBC SERPL-MCNC: 170 UG/DL (ref 118–369)
VIT B12 SERPL-MCNC: 288 PG/ML (ref 232–1245)
WBC # BLD AUTO: 5.9 X10E3/UL (ref 3.4–10.8)

## 2023-03-29 PROCEDURE — G0299 HHS/HOSPICE OF RN EA 15 MIN: HCPCS

## 2023-03-30 NOTE — PROGRESS NOTES
Call results to patient.  Anemia improved, but b12 low - start b12 1000mcg monthly for 6 months.  Can set-up nurse visits or send to pharmacy.  Once shots complete can change to oral to maintain

## 2023-03-31 DIAGNOSIS — I10 ESSENTIAL HYPERTENSION: ICD-10-CM

## 2023-03-31 RX ORDER — CARVEDILOL 12.5 MG/1
12.5 TABLET ORAL 2 TIMES DAILY WITH MEALS
Qty: 60 TABLET | Refills: 0 | Status: SHIPPED | OUTPATIENT
Start: 2023-03-31 | End: 2023-04-04 | Stop reason: SDUPTHER

## 2023-03-31 NOTE — TELEPHONE ENCOUNTER
Caller: Gita Avelar    Relationship: Self    Best call back number: [phone]    Requested Prescriptions:   Requested Prescriptions     Pending Prescriptions Disp Refills   • carvedilol (COREG) 12.5 MG tablet 60 tablet 0     Sig: Take 1 tablet by mouth 2 (Two) Times a Day With Meals for 30 days. Indications: High Blood Pressure Disorder        Pharmacy where request should be sent: [address] [phone] [facility] [phone]      Last office visit with prescribing clinician: 1/19/2023   Last telemedicine visit with prescribing clinician: Visit date not found   Next office visit with prescribing clinician: Visit date not found     Additional details provided by patient: THE PATIENT IS CURRENTLY OUT OF THIS MEDICATION.     Does the patient have less than a 3 day supply:  [x] Yes  [] No    Would you like a call back once the refill request has been completed: [x] Yes [] No    If the office needs to give you a call back, can they leave a voicemail: [x] Yes [] No    Holger Irwin Rep   03/31/23 15:29 EDT

## 2023-04-02 DIAGNOSIS — F33.1 MODERATE EPISODE OF RECURRENT MAJOR DEPRESSIVE DISORDER: ICD-10-CM

## 2023-04-03 RX ORDER — DULOXETIN HYDROCHLORIDE 60 MG/1
CAPSULE, DELAYED RELEASE ORAL
Qty: 90 CAPSULE | Refills: 0 | OUTPATIENT
Start: 2023-04-03

## 2023-04-04 ENCOUNTER — CLINICAL SUPPORT (OUTPATIENT)
Dept: FAMILY MEDICINE CLINIC | Facility: CLINIC | Age: 78
End: 2023-04-04
Payer: COMMERCIAL

## 2023-04-04 DIAGNOSIS — E53.8 B12 DEFICIENCY: ICD-10-CM

## 2023-04-04 PROCEDURE — 96372 THER/PROPH/DIAG INJ SC/IM: CPT | Performed by: FAMILY MEDICINE

## 2023-04-04 RX ORDER — CARVEDILOL 12.5 MG/1
12.5 TABLET ORAL 2 TIMES DAILY WITH MEALS
Qty: 60 TABLET | Refills: 0 | Status: SHIPPED | OUTPATIENT
Start: 2023-04-04 | End: 2023-05-04

## 2023-04-04 RX ORDER — CYANOCOBALAMIN 1000 UG/ML
1000 INJECTION, SOLUTION INTRAMUSCULAR; SUBCUTANEOUS WEEKLY
Status: SHIPPED | OUTPATIENT
Start: 2023-04-04

## 2023-04-04 RX ADMIN — CYANOCOBALAMIN 1000 MCG: 1000 INJECTION, SOLUTION INTRAMUSCULAR; SUBCUTANEOUS at 11:08

## 2023-04-09 DIAGNOSIS — F33.1 MODERATE EPISODE OF RECURRENT MAJOR DEPRESSIVE DISORDER: ICD-10-CM

## 2023-04-09 RX ORDER — DULOXETIN HYDROCHLORIDE 60 MG/1
CAPSULE, DELAYED RELEASE ORAL
Qty: 90 CAPSULE | Refills: 0 | OUTPATIENT
Start: 2023-04-09

## 2023-04-10 DIAGNOSIS — F33.1 MODERATE EPISODE OF RECURRENT MAJOR DEPRESSIVE DISORDER: ICD-10-CM

## 2023-04-10 RX ORDER — DULOXETIN HYDROCHLORIDE 60 MG/1
CAPSULE, DELAYED RELEASE ORAL
Qty: 90 CAPSULE | Refills: 0 | OUTPATIENT
Start: 2023-04-10

## 2023-04-11 ENCOUNTER — CLINICAL SUPPORT (OUTPATIENT)
Dept: FAMILY MEDICINE CLINIC | Facility: CLINIC | Age: 78
End: 2023-04-11
Payer: MEDICARE

## 2023-04-11 DIAGNOSIS — E53.8 B12 DEFICIENCY: ICD-10-CM

## 2023-04-11 PROCEDURE — 96372 THER/PROPH/DIAG INJ SC/IM: CPT | Performed by: FAMILY MEDICINE

## 2023-04-11 RX ADMIN — CYANOCOBALAMIN 1000 MCG: 1000 INJECTION, SOLUTION INTRAMUSCULAR; SUBCUTANEOUS at 11:03

## 2023-04-15 DIAGNOSIS — F33.1 MODERATE EPISODE OF RECURRENT MAJOR DEPRESSIVE DISORDER: ICD-10-CM

## 2023-04-17 ENCOUNTER — TELEPHONE (OUTPATIENT)
Dept: FAMILY MEDICINE CLINIC | Facility: CLINIC | Age: 78
End: 2023-04-17
Payer: MEDICARE

## 2023-04-17 RX ORDER — DULOXETIN HYDROCHLORIDE 60 MG/1
60 CAPSULE, DELAYED RELEASE ORAL DAILY
Qty: 30 CAPSULE | Refills: 5 | Status: SHIPPED | OUTPATIENT
Start: 2023-04-17

## 2023-04-17 RX ORDER — DULOXETIN HYDROCHLORIDE 60 MG/1
CAPSULE, DELAYED RELEASE ORAL
Qty: 90 CAPSULE | Refills: 0 | OUTPATIENT
Start: 2023-04-17

## 2023-04-17 NOTE — TELEPHONE ENCOUNTER
Patients DULoxetine 60mg has been denied because she needs an appointment. Pt states she was just here on 03-.     Pt states she needs her meds asap.     Please Advise     [phone]

## 2023-04-19 ENCOUNTER — CLINICAL SUPPORT (OUTPATIENT)
Dept: FAMILY MEDICINE CLINIC | Facility: CLINIC | Age: 78
End: 2023-04-19
Payer: MEDICARE

## 2023-04-19 DIAGNOSIS — E53.8 B12 DEFICIENCY: ICD-10-CM

## 2023-04-19 RX ADMIN — CYANOCOBALAMIN 1000 MCG: 1000 INJECTION, SOLUTION INTRAMUSCULAR; SUBCUTANEOUS at 11:30

## 2023-06-07 ENCOUNTER — OFFICE VISIT (OUTPATIENT)
Dept: FAMILY MEDICINE CLINIC | Facility: CLINIC | Age: 78
End: 2023-06-07
Payer: MEDICARE

## 2023-06-07 VITALS
HEART RATE: 73 BPM | HEIGHT: 64 IN | SYSTOLIC BLOOD PRESSURE: 156 MMHG | OXYGEN SATURATION: 93 % | DIASTOLIC BLOOD PRESSURE: 110 MMHG | BODY MASS INDEX: 23.56 KG/M2 | WEIGHT: 138 LBS

## 2023-06-07 DIAGNOSIS — E78.2 MIXED HYPERLIPIDEMIA: ICD-10-CM

## 2023-06-07 DIAGNOSIS — E53.8 B12 DEFICIENCY: ICD-10-CM

## 2023-06-07 DIAGNOSIS — I10 PRIMARY HYPERTENSION: Primary | ICD-10-CM

## 2023-06-07 RX ORDER — CYANOCOBALAMIN 1000 UG/ML
1000 INJECTION, SOLUTION INTRAMUSCULAR; SUBCUTANEOUS WEEKLY
Status: SHIPPED | OUTPATIENT
Start: 2023-06-07 | End: 2023-08-02

## 2023-06-07 RX ORDER — DULOXETIN HYDROCHLORIDE 60 MG/1
60 CAPSULE, DELAYED RELEASE ORAL DAILY
Qty: 90 CAPSULE | Refills: 3 | Status: SHIPPED | OUTPATIENT
Start: 2023-06-07

## 2023-06-07 RX ORDER — CARVEDILOL 25 MG/1
25 TABLET ORAL 2 TIMES DAILY WITH MEALS
Qty: 180 TABLET | Refills: 3 | Status: SHIPPED | OUTPATIENT
Start: 2023-06-07

## 2023-06-07 RX ORDER — LISINOPRIL 40 MG/1
40 TABLET ORAL DAILY
Qty: 90 TABLET | Refills: 3 | Status: SHIPPED | OUTPATIENT
Start: 2023-06-07

## 2023-06-07 RX ORDER — ROSUVASTATIN CALCIUM 20 MG/1
20 TABLET, COATED ORAL NIGHTLY
Qty: 90 TABLET | Refills: 3 | Status: SHIPPED | OUTPATIENT
Start: 2023-06-07

## 2023-06-07 RX ADMIN — CYANOCOBALAMIN 1000 MCG: 1000 INJECTION, SOLUTION INTRAMUSCULAR; SUBCUTANEOUS at 11:59

## 2023-06-07 NOTE — PROGRESS NOTES
Subjective   Gita Avelar is a 78 y.o. female. Presents today for   Chief Complaint   Patient presents with    Hypertension       History of Present Illness  Patient 77 y/o with hypertension, hyperlipidemia here for f/u;  last labs low b12, relates was weekly IM and woul dlike take rather than monthly;   She has macular degeneration and does have visual difficulties;  Daughter does pill box for her.   All meds take brought today and no amlodipine that is on list.   Is taking others.  BP at home dbp elevated to 90s and top 140s;      Review of Systems   Eyes:  Positive for visual disturbance.   Respiratory:  Negative for shortness of breath.    Cardiovascular:  Negative for chest pain, palpitations and leg swelling.   Gastrointestinal:  Negative for abdominal pain.     Patient Active Problem List   Diagnosis    Avitaminosis D    B12 deficiency    Near syncope    Atrophic vaginitis    Awareness of heartbeats    OP (osteoporosis)    HLD (hyperlipidemia)    Fatigue    Essential hypertension    Moderate episode of recurrent major depressive disorder    Nipple discharge    Abnormal ultrasound of breast    Abnormal mammogram    Lump or mass in breast    Family history of malignant neoplasm of pancreas    Atherosclerosis of both carotid arteries    Acute UTI    COVID-19 virus infection    Hypoxia    Streptococcal septicemia    Generalized weakness    Dizziness on standing    History of Guillain-[place] syndrome    History of subdural hematoma    Dysautonomia    Cytokine release syndrome, grade 1       Social History     Socioeconomic History    Marital status:    Tobacco Use    Smoking status: Former     Packs/day: 0.50     Years: 25.00     Pack years: 12.50     Types: Cigarettes     Start date:      Quit date: 2021     Years since quittin.9    Smokeless tobacco: Never   Vaping Use    Vaping Use: Never used   Substance and Sexual Activity    Alcohol use: Yes     Comment: occasionally     Drug use: No  "   Sexual activity: Not Currently     Partners: Male       No Known Allergies    Current Outpatient Medications on File Prior to Visit   Medication Sig Dispense Refill    acetaminophen (TYLENOL) 500 MG tablet Take 2 tablets by mouth Every 6 (Six) Hours As Needed for Mild Pain. Indications: Pain      aspirin 81 MG tablet Take 1 tablet by mouth Daily. Indications: Disease involving Lipid Deposits in the Arteries      bisacodyl (DULCOLAX) 5 MG EC tablet Take 1 tablet by mouth Daily As Needed for Constipation. Indications: Constipation      cholecalciferol (VITAMIN D3) 1000 UNITS tablet Take 1 tablet by mouth Daily. Indications: Vitamin D Deficiency      Multiple Vitamins-Minerals (PRESERVISION AREDS PO) Take 1 tablet by mouth Daily. Indications: Vitamin Deficiency      [DISCONTINUED] amLODIPine (NORVASC) 5 MG tablet Take 1 tablet by mouth Daily. 60 tablet 0    [DISCONTINUED] DULoxetine (CYMBALTA) 60 MG capsule Take 1 capsule by mouth Daily. 30 capsule 5    [DISCONTINUED] lisinopril (PRINIVIL,ZESTRIL) 40 MG tablet Take 1 tablet by mouth Daily. Indications: High Blood Pressure Disorder      [DISCONTINUED] rosuvastatin (Crestor) 20 MG tablet Take 1 tablet by mouth Every Night. Indications: High Amount of Fats in the Blood 90 tablet 1    [DISCONTINUED] carvedilol (COREG) 12.5 MG tablet Take 1 tablet by mouth 2 (Two) Times a Day With Meals for 30 days. Indications: High Blood Pressure Disorder 60 tablet 0     Current Facility-Administered Medications on File Prior to Visit   Medication Dose Route Frequency Provider Last Rate Last Admin    [DISCONTINUED] cyanocobalamin injection 1,000 mcg  1,000 mcg Intramuscular Weekly Mark Hernández DO   1,000 mcg at 04/19/23 1130       Objective   Vitals:    06/07/23 1117   BP: (!) 156/110   Pulse: 73   SpO2: 93%   Weight: 62.6 kg (138 lb)   Height: 162.6 cm (64\")     Body mass index is 23.69 kg/m².    Physical Exam  Vitals and nursing note reviewed.   Constitutional:       " Appearance: She is well-developed.   HENT:      Head: Normocephalic and atraumatic.   Neck:      Thyroid: No thyromegaly.      Vascular: No JVD.   Cardiovascular:      Rate and Rhythm: Normal rate and regular rhythm.      Heart sounds: Normal heart sounds. No murmur heard.    No friction rub. No gallop.   Pulmonary:      Effort: Pulmonary effort is normal. No respiratory distress.      Breath sounds: Normal breath sounds. No wheezing or rales.   Abdominal:      General: Bowel sounds are normal. There is no distension.      Palpations: Abdomen is soft.      Tenderness: There is no abdominal tenderness. There is no guarding or rebound.   Musculoskeletal:      Cervical back: Neck supple.   Skin:     General: Skin is warm and dry.   Neurological:      Mental Status: She is alert.   Psychiatric:         Behavior: Behavior normal.     Component      Latest Ref [facility] 3/28/2023   WBC      3.4 - 10.8 x10E3/uL 5.9    RBC      3.77 - 5.28 x10E6/uL 4.24    Hemoglobin      11.1 - 15.9 g/dL 13.1    Hematocrit      34.0 - 46.6 % 38.7    MCV      79 - 97 fL 91    MCH      26.6 - 33.0 pg 30.9    MCHC      31.5 - 35.7 g/dL 33.9    RDW      11.7 - 15.4 % 12.7    Platelets      150 - 450 x10E3/uL 224    Neutrophil Rel %      Not Estab. % 52    Lymphocyte Rel %      Not Estab. % 35    Monocyte Rel %      Not Estab. % 9    Eosinophil Rel %      Not Estab. % 3    Basophil Rel %      Not Estab. % 1    Neutrophils Absolute      1.4 - 7.0 x10E3/uL 3.1    Lymphocytes Absolute      0.7 - 3.1 x10E3/uL 2.1    Monocytes Absolute      0.1 - 0.9 x10E3/uL 0.6    Eosinophils Absolute      0.0 - 0.4 x10E3/uL 0.2    Basophils Absolute      0.0 - 0.2 x10E3/uL 0.1    Immature Granulocyte Rel %      Not Estab. % 0    Immature Grans, Absolute      0.0 - 0.1 x10E3/uL 0.0    Glucose      70 - 99 mg/dL 86    BUN      8 - 27 mg/dL 21    Creatinine      0.57 - 1.00 mg/dL 0.79    EGFR Result      >59 mL/min/1.73 77    BUN/Creatinine Ratio      12 - 28  27     Sodium      134 - 144 mmol/L 142    Potassium      3.5 - 5.2 mmol/L 4.4    Chloride      96 - 106 mmol/L 106    CO2      20 - 29 mmol/L 24    Calcium      8.7 - 10.3 mg/dL 9.5    Albumin      3.7 - 4.7 g/dL 4.3    Albumin      2.9 - 4.4 g/dL 3.5    Globulin      1.5 - 4.5 g/dL 2.5    Globulin      2.2 - 3.9 g/dL 3.3    A/G Ratio      1.2 - 2.2  1.7    A/G Ratio      0.7 - 1.7  1.1    Total Bilirubin      0.0 - 1.2 mg/dL 0.7    Alkaline Phosphatase      44 - 121 IU/L 88    AST (SGOT)      0 - 40 IU/L 17    ALT (SGPT)      0 - 32 IU/L 13    IgG      586 - 1,602 mg/dL 1,105    IgA      64 - 422 mg/dL 294    IgM      26 - 217 mg/dL 81    Total Protein      6.0 - 8.5 g/dL 6.8    Alpha-1-Globulin      0.0 - 0.4 g/dL 0.3    Alpha-2-Globulin      0.4 - 1.0 g/dL 0.9    Beta Globulin      0.7 - 1.3 g/dL 1.1    Gamma Globulin      0.4 - 1.8 g/dL 1.1    M-Sal      Not Observed g/dL Not Observed    Immunofixation Reflex, Serum Comment    Please note Comment    Kappa FLC      3.3 - 19.4 mg/L 35.5 (H)    Free Lambda Light Chains      5.7 - 26.3 mg/L 19.2    Kappa/Lambda Ratio      0.26 - 1.65  1.85 (H)    TIBC      250 - 450 ug/dL 288    UIBC      118 - 369 ug/dL 170    Iron      27 - 139 ug/dL 118    Iron Saturation      15 - 55 % 41    TSH Baseline      0.450 - 4.500 uIU/mL 3.010    Ferritin      15 - 150 ng/mL 73    Folate      >3.0 ng/mL 12.1    Vitamin B-12      232 - 1,245 pg/mL 288    Reticulocyte Absolute      0.6 - 2.6 % 1.1      Assessment & Plan   Diagnoses and all orders for this visit:    1. Primary hypertension (Primary)  -     carvedilol (COREG) 25 MG tablet; Take 1 tablet by mouth 2 (Two) Times a Day With Meals. Indications: High Blood Pressure Disorder  Dispense: 180 tablet; Refill: 3  -     lisinopril (PRINIVIL,ZESTRIL) 40 MG tablet; Take 1 tablet by mouth Daily. Indications: High Blood Pressure Disorder  Dispense: 90 tablet; Refill: 3    2. B12 deficiency  -     cyanocobalamin injection 1,000 mcg    3. Mixed  hyperlipidemia  -     rosuvastatin (Crestor) 20 MG tablet; Take 1 tablet by mouth Every Night. Indications: High Amount of Fats in the Blood  Dispense: 90 tablet; Refill: 3    Other orders  -     DULoxetine (CYMBALTA) 60 MG capsule; Take 1 capsule by mouth Daily.  Dispense: 90 capsule; Refill: 3    BP uncontrolled, increase carvedilol from 12.5mg bid to 25mg po bid;   continue keep log;   d/c amlodipine fromlist as reports not taking as brought all meds on and not included  Hld - contineu statin, refilled  B12 up dated today and weekly              BMI is within normal parameters. No other follow-up for BMI required.     -Follow up: 8 weeks and prn

## 2023-08-22 ENCOUNTER — OFFICE VISIT (OUTPATIENT)
Dept: FAMILY MEDICINE CLINIC | Facility: CLINIC | Age: 78
End: 2023-08-22
Payer: MEDICARE

## 2023-08-22 VITALS
DIASTOLIC BLOOD PRESSURE: 100 MMHG | WEIGHT: 138 LBS | OXYGEN SATURATION: 95 % | HEART RATE: 72 BPM | SYSTOLIC BLOOD PRESSURE: 160 MMHG | HEIGHT: 64 IN | BODY MASS INDEX: 23.56 KG/M2

## 2023-08-22 DIAGNOSIS — I10 RESISTANT HYPERTENSION: Primary | ICD-10-CM

## 2023-08-22 DIAGNOSIS — E53.8 B12 DEFICIENCY: ICD-10-CM

## 2023-08-22 PROCEDURE — 3080F DIAST BP >= 90 MM HG: CPT | Performed by: FAMILY MEDICINE

## 2023-08-22 PROCEDURE — 3077F SYST BP >= 140 MM HG: CPT | Performed by: FAMILY MEDICINE

## 2023-08-22 PROCEDURE — 99214 OFFICE O/P EST MOD 30 MIN: CPT | Performed by: FAMILY MEDICINE

## 2023-08-22 RX ORDER — AMLODIPINE BESYLATE 5 MG/1
5 TABLET ORAL DAILY
Qty: 30 TABLET | Refills: 5 | Status: SHIPPED | OUTPATIENT
Start: 2023-08-22

## 2023-08-22 NOTE — PROGRESS NOTES
Subjective   Gita Avelar is a 78 y.o. female. Presents today for   Chief Complaint   Patient presents with    Hypertension    Dizziness              History of Present Illness  Patient 77 y/o female here for f/u fo htn;  still going very high and feels light headed/dizzy when does;  no focal neuro deficits;   no facial asymmetries;   no cp/soa;  has macular degeneration, but daughter checks pill box;   last ov raised carvedilol.  Was not on amlodipine last ov and off med list now;  still taking lisinopril max dose.   Had b12 deficiency, took shots doesn't feel any different.  Due recheck    Review of Systems   Respiratory:  Negative for shortness of breath.    Cardiovascular:  Negative for chest pain.   Neurological:  Positive for dizziness and light-headedness. Negative for headaches.     Patient Active Problem List   Diagnosis    Avitaminosis D    B12 deficiency    Near syncope    Atrophic vaginitis    Awareness of heartbeats    OP (osteoporosis)    HLD (hyperlipidemia)    Fatigue    Essential hypertension    Moderate episode of recurrent major depressive disorder    Nipple discharge    Abnormal ultrasound of breast    Abnormal mammogram    Lump or mass in breast    Family history of malignant neoplasm of pancreas    Atherosclerosis of both carotid arteries    Acute UTI    COVID-19 virus infection    Hypoxia    Streptococcal septicemia    Generalized weakness    Dizziness on standing    History of Guillain-Shelby syndrome    History of subdural hematoma    Dysautonomia    Cytokine release syndrome, grade 1       Social History     Socioeconomic History    Marital status:    Tobacco Use    Smoking status: Former     Packs/day: 0.50     Years: 25.00     Pack years: 12.50     Types: Cigarettes     Start date:      Quit date: 2021     Years since quittin.1    Smokeless tobacco: Never   Vaping Use    Vaping Use: Never used   Substance and Sexual Activity    Alcohol use: Yes     Comment:  "occasionally     Drug use: No    Sexual activity: Not Currently     Partners: Male       No Known Allergies    Current Outpatient Medications on File Prior to Visit   Medication Sig Dispense Refill    acetaminophen (TYLENOL) 500 MG tablet Take 2 tablets by mouth Every 6 (Six) Hours As Needed for Mild Pain. Indications: Pain      aspirin 81 MG tablet Take 1 tablet by mouth Daily. Indications: Disease involving Lipid Deposits in the Arteries      bisacodyl (DULCOLAX) 5 MG EC tablet Take 1 tablet by mouth Daily As Needed for Constipation. Indications: Constipation      carvedilol (COREG) 25 MG tablet Take 1 tablet by mouth 2 (Two) Times a Day With Meals. Indications: High Blood Pressure Disorder 180 tablet 3    cholecalciferol (VITAMIN D3) 1000 UNITS tablet Take 1 tablet by mouth Daily. Indications: Vitamin D Deficiency      DULoxetine (CYMBALTA) 60 MG capsule Take 1 capsule by mouth Daily. 90 capsule 3    lisinopril (PRINIVIL,ZESTRIL) 40 MG tablet Take 1 tablet by mouth Daily. Indications: High Blood Pressure Disorder 90 tablet 3    Multiple Vitamins-Minerals (PRESERVISION AREDS PO) Take 1 tablet by mouth Daily. Indications: Vitamin Deficiency      rosuvastatin (Crestor) 20 MG tablet Take 1 tablet by mouth Every Night. Indications: High Amount of Fats in the Blood 90 tablet 3     No current facility-administered medications on file prior to visit.       Objective   Vitals:    08/22/23 1254 08/22/23 1306   BP: (!) 168/102 160/100   Pulse: 72    SpO2: 95%    Weight: 62.6 kg (138 lb)    Height: 162.6 cm (64\")      Body mass index is 23.69 kg/mý.    Physical Exam  Vitals and nursing note reviewed.   Constitutional:       Appearance: Normal appearance. She is not toxic-appearing or diaphoretic.   HENT:      Head: Normocephalic and atraumatic.   Musculoskeletal:      Cervical back: Neck supple.   Skin:     General: Skin is warm and dry.      Capillary Refill: Capillary refill takes less than 2 seconds.   Neurological:      " Mental Status: She is alert.   Psychiatric:         Mood and Affect: Mood normal.         Behavior: Behavior normal.       Assessment & Plan   Diagnoses and all orders for this visit:    1. Resistant hypertension (Primary)  -     amLODIPine (NORVASC) 5 MG tablet; Take 1 tablet by mouth Daily.  Dispense: 30 tablet; Refill: 5  -     Aldosterone / Renin Ratio  -     Basic Metabolic Panel    2. B12 deficiency  -     CBC (No Diff)  -     Vitamin B12    Will add amlodipine as reportedly not taking;  continue carvedilol and lisinopril;  continue keep log;  will check aldosterone/renin ratio  Recheck b12  Go ED if any focal neuro deficits  Up set today as sister dying and cannot go to her [place] and unable to travel due to vision.           BMI is within normal parameters. No other follow-up for BMI required.     -Follow up: 4 weeks and prn

## 2023-08-28 LAB
ALDOST SERPL-MCNC: 3.3 NG/DL (ref 0–30)
ALDOST/RENIN PLAS-RTO: 15.3 {RATIO} (ref 0–30)
BUN SERPL-MCNC: 22 MG/DL (ref 8–27)
BUN/CREAT SERPL: 26 (ref 12–28)
CALCIUM SERPL-MCNC: 10.2 MG/DL (ref 8.7–10.3)
CHLORIDE SERPL-SCNC: 104 MMOL/L (ref 96–106)
CO2 SERPL-SCNC: 25 MMOL/L (ref 20–29)
CREAT SERPL-MCNC: 0.86 MG/DL (ref 0.57–1)
EGFRCR SERPLBLD CKD-EPI 2021: 69 ML/MIN/1.73
ERYTHROCYTE [DISTWIDTH] IN BLOOD BY AUTOMATED COUNT: 12.6 % (ref 11.7–15.4)
GLUCOSE SERPL-MCNC: 91 MG/DL (ref 70–99)
HCT VFR BLD AUTO: 44.8 % (ref 34–46.6)
HGB BLD-MCNC: 14.5 G/DL (ref 11.1–15.9)
MCH RBC QN AUTO: 29.5 PG (ref 26.6–33)
MCHC RBC AUTO-ENTMCNC: 32.4 G/DL (ref 31.5–35.7)
MCV RBC AUTO: 91 FL (ref 79–97)
PLATELET # BLD AUTO: 218 X10E3/UL (ref 150–450)
POTASSIUM SERPL-SCNC: 4.9 MMOL/L (ref 3.5–5.2)
RBC # BLD AUTO: 4.91 X10E6/UL (ref 3.77–5.28)
RENIN PLAS-CCNC: 0.22 NG/ML/HR (ref 0.17–5.38)
SODIUM SERPL-SCNC: 142 MMOL/L (ref 134–144)
VIT B12 SERPL-MCNC: 480 PG/ML (ref 232–1245)
WBC # BLD AUTO: 6.7 X10E3/UL (ref 3.4–10.8)

## 2023-09-04 NOTE — PROGRESS NOTES
Mail copy of results to patient.  B12 improved  Blood counts normal  Renin/aldosterone normal  Kidney function and potassium normal

## 2023-09-26 ENCOUNTER — OFFICE VISIT (OUTPATIENT)
Dept: FAMILY MEDICINE CLINIC | Facility: CLINIC | Age: 78
End: 2023-09-26
Payer: MEDICARE

## 2023-09-26 VITALS
BODY MASS INDEX: 24.41 KG/M2 | HEART RATE: 68 BPM | HEIGHT: 64 IN | DIASTOLIC BLOOD PRESSURE: 88 MMHG | SYSTOLIC BLOOD PRESSURE: 132 MMHG | OXYGEN SATURATION: 95 % | WEIGHT: 143 LBS

## 2023-09-26 DIAGNOSIS — R42 VERTIGO: ICD-10-CM

## 2023-09-26 DIAGNOSIS — F33.1 MODERATE EPISODE OF RECURRENT MAJOR DEPRESSIVE DISORDER: ICD-10-CM

## 2023-09-26 DIAGNOSIS — I10 PRIMARY HYPERTENSION: Primary | ICD-10-CM

## 2023-09-26 PROCEDURE — 3079F DIAST BP 80-89 MM HG: CPT | Performed by: FAMILY MEDICINE

## 2023-09-26 PROCEDURE — 1159F MED LIST DOCD IN RCRD: CPT | Performed by: FAMILY MEDICINE

## 2023-09-26 PROCEDURE — 99214 OFFICE O/P EST MOD 30 MIN: CPT | Performed by: FAMILY MEDICINE

## 2023-09-26 PROCEDURE — 1160F RVW MEDS BY RX/DR IN RCRD: CPT | Performed by: FAMILY MEDICINE

## 2023-09-26 PROCEDURE — 3075F SYST BP GE 130 - 139MM HG: CPT | Performed by: FAMILY MEDICINE

## 2023-09-26 RX ORDER — DULOXETIN HYDROCHLORIDE 60 MG/1
CAPSULE, DELAYED RELEASE ORAL
Qty: 90 CAPSULE | Refills: 3 | Status: SHIPPED | OUTPATIENT
Start: 2023-09-26

## 2023-09-26 RX ORDER — DULOXETIN HYDROCHLORIDE 30 MG/1
CAPSULE, DELAYED RELEASE ORAL
Qty: 90 CAPSULE | Refills: 1 | Status: SHIPPED | OUTPATIENT
Start: 2023-09-26

## 2023-09-26 NOTE — PROGRESS NOTES
Subjective   Gita Avelar is a 78 y.o. female. Presents today for   Chief Complaint   Patient presents with    Hypertension    Dizziness           Fall           Depression     Sister  this month       History of Present Illness  Patient 79 y/o female here for f/u of HTN, added amlodipine last time as uncontrolled.   Just lost Sister 4 to 5 days.  She is last of her generation and so sad.  Reports had another vertigo attack and fell;  knee pain had has resolved;   Vertigo uncommon, but has light headed sensation frequently.   No syncope;  on cp/soa;  Takes meclizine with relief and motion ease rubs behind ear.    Review of Systems   Respiratory:  Negative for shortness of breath.    Cardiovascular:  Negative for chest pain and palpitations.   Gastrointestinal:  Negative for abdominal pain.     Patient Active Problem List   Diagnosis    Avitaminosis D    B12 deficiency    Near syncope    Atrophic vaginitis    Awareness of heartbeats    OP (osteoporosis)    HLD (hyperlipidemia)    Fatigue    Essential hypertension    Moderate episode of recurrent major depressive disorder    Nipple discharge    Abnormal ultrasound of breast    Abnormal mammogram    Lump or mass in breast    Family history of malignant neoplasm of pancreas    Atherosclerosis of both carotid arteries    Acute UTI    COVID-19 virus infection    Hypoxia    Streptococcal septicemia    Generalized weakness    Dizziness on standing    History of Guillain-Seneca syndrome    History of subdural hematoma    Dysautonomia    Cytokine release syndrome, grade 1       Social History     Socioeconomic History    Marital status:    Tobacco Use    Smoking status: Former     Packs/day: 0.50     Years: 25.00     Pack years: 12.50     Types: Cigarettes     Start date:      Quit date: 2021     Years since quittin.2    Smokeless tobacco: Never   Vaping Use    Vaping Use: Never used   Substance and Sexual Activity    Alcohol use: Yes     Comment:  "occasionally     Drug use: No    Sexual activity: Not Currently     Partners: Male       No Known Allergies    Current Outpatient Medications on File Prior to Visit   Medication Sig Dispense Refill    acetaminophen (TYLENOL) 500 MG tablet Take 2 tablets by mouth Every 6 (Six) Hours As Needed for Mild Pain. Indications: Pain      amLODIPine (NORVASC) 5 MG tablet Take 1 tablet by mouth Daily. 30 tablet 5    aspirin 81 MG tablet Take 1 tablet by mouth Daily. Indications: Disease involving Lipid Deposits in the Arteries      bisacodyl (DULCOLAX) 5 MG EC tablet Take 1 tablet by mouth Daily As Needed for Constipation. Indications: Constipation      carvedilol (COREG) 25 MG tablet Take 1 tablet by mouth 2 (Two) Times a Day With Meals. Indications: High Blood Pressure Disorder 180 tablet 3    cholecalciferol (VITAMIN D3) 1000 UNITS tablet Take 1 tablet by mouth Daily. Indications: Vitamin D Deficiency      lisinopril (PRINIVIL,ZESTRIL) 40 MG tablet Take 1 tablet by mouth Daily. Indications: High Blood Pressure Disorder 90 tablet 3    Multiple Vitamins-Minerals (PRESERVISION AREDS PO) Take 1 tablet by mouth Daily. Indications: Vitamin Deficiency      rosuvastatin (Crestor) 20 MG tablet Take 1 tablet by mouth Every Night. Indications: High Amount of Fats in the Blood 90 tablet 3    [DISCONTINUED] DULoxetine (CYMBALTA) 60 MG capsule Take 1 capsule by mouth Daily. 90 capsule 3     No current facility-administered medications on file prior to visit.       Objective   Vitals:    09/26/23 1352   BP: 132/88   Pulse: 68   SpO2: 95%   Weight: 64.9 kg (143 lb)   Height: 162.6 cm (64\")     Body mass index is 24.55 kg/m².    Physical Exam  Vitals and nursing note reviewed.   Constitutional:       Appearance: She is well-developed.   HENT:      Head: Normocephalic and atraumatic.   Neck:      Thyroid: No thyromegaly.      Vascular: No JVD.   Cardiovascular:      Rate and Rhythm: Normal rate and regular rhythm.      Heart sounds: Normal " heart sounds. No murmur heard.    No friction rub. No gallop.   Pulmonary:      Effort: Pulmonary effort is normal. No respiratory distress.      Breath sounds: Normal breath sounds. No wheezing or rales.   Abdominal:      General: Bowel sounds are normal. There is no distension.      Palpations: Abdomen is soft.      Tenderness: There is no abdominal tenderness. There is no guarding or rebound.   Musculoskeletal:      Cervical back: Neck supple.   Skin:     General: Skin is warm and dry.   Neurological:      Mental Status: She is alert.   Psychiatric:         Behavior: Behavior normal.     Component      Latest Ref Rng 8/22/2023   Glucose      70 - 99 mg/dL 91    BUN      8 - 27 mg/dL 22    Creatinine      0.57 - 1.00 mg/dL 0.86    EGFR Result      >59 mL/min/1.73 69    BUN/Creatinine Ratio      12 - 28  26    Sodium      134 - 144 mmol/L 142    Potassium      3.5 - 5.2 mmol/L 4.9    Chloride      96 - 106 mmol/L 104    CO2      20 - 29 mmol/L 25    Calcium      8.7 - 10.3 mg/dL 10.2    WBC      3.4 - 10.8 x10E3/uL 6.7    RBC      3.77 - 5.28 x10E6/uL 4.91    Hemoglobin      11.1 - 15.9 g/dL 14.5    Hematocrit      34.0 - 46.6 % 44.8    MCV      79 - 97 fL 91    MCH      26.6 - 33.0 pg 29.5    MCHC      31.5 - 35.7 g/dL 32.4    RDW      11.7 - 15.4 % 12.6    Platelets      150 - 450 x10E3/uL 218    Aldosterone      0.0 - 30.0 ng/dL 3.3    Renin Activity      0.167 - 5.380 ng/mL/hr 0.216    Aldosterone/Renin Ratio      0.0 - 30.0  15.3    Vitamin B-12      232 - 1,245 pg/mL 480        Assessment & Plan   Diagnoses and all orders for this visit:    1. Primary hypertension (Primary)    2. Vertigo    3. Moderate episode of recurrent major depressive disorder  -     DULoxetine (CYMBALTA) 30 MG capsule; Take 30mg + 60mg 1 po cap daily  Dispense: 90 capsule; Refill: 1  -     DULoxetine (CYMBALTA) 60 MG capsule; Take 60mg + 30mg cap 1 each together PO daily  Dispense: 90 capsule; Refill: 3    -hypertension - controlled,  continue medications (adequate for age as noting lightheadedness).  Vertigo - rare, meclizine helps.  Also orthostatic symptoms, take time getting up.  Will try going up on duloxetine to see if helps mood         BMI is within normal parameters. No other follow-up for BMI required.     -Follow up: 3 months and prn

## 2023-10-24 DIAGNOSIS — F33.1 MODERATE EPISODE OF RECURRENT MAJOR DEPRESSIVE DISORDER: ICD-10-CM

## 2023-10-24 RX ORDER — DULOXETIN HYDROCHLORIDE 60 MG/1
CAPSULE, DELAYED RELEASE ORAL
Qty: 90 CAPSULE | Refills: 1 | Status: SHIPPED | OUTPATIENT
Start: 2023-10-24

## 2023-10-24 NOTE — TELEPHONE ENCOUNTER
Caller: Gita Avelar    Relationship: Self    Best call back number: [phone]*    Requested Prescriptions:   Requested Prescriptions     Pending Prescriptions Disp Refills    DULoxetine (CYMBALTA) 60 MG capsule 90 capsule 3     Sig: Take 60mg + 30mg cap 1 each together PO daily        Pharmacy where request should be sent: [facility] [address] [place] [id number] [facility] [phone] [facility] [phone] [facility]     Last office visit with prescribing clinician: 9/26/2023   Last telemedicine visit with prescribing clinician: Visit date not found   Next office visit with prescribing clinician: 12/20/2023     Additional details provided by patient: PATIENT HAS 2-3 DAYS OF MEDICATION REMAINING. REQUESTING A 90-DAY REFILL.    Does the patient have less than a 3 day supply:  [x] Yes  [] No    Would you like a call back once the refill request has been completed: [] Yes [x] No    If the office needs to give you a call back, can they leave a voicemail: [] Yes [x] No    Veda Wyatt   10/24/23 11:30 EDT

## 2023-12-20 ENCOUNTER — OFFICE VISIT (OUTPATIENT)
Dept: FAMILY MEDICINE CLINIC | Facility: CLINIC | Age: 78
End: 2023-12-20
Payer: MEDICARE

## 2023-12-20 VITALS
WEIGHT: 145 LBS | OXYGEN SATURATION: 93 % | SYSTOLIC BLOOD PRESSURE: 132 MMHG | BODY MASS INDEX: 24.75 KG/M2 | DIASTOLIC BLOOD PRESSURE: 76 MMHG | HEART RATE: 74 BPM | HEIGHT: 64 IN

## 2023-12-20 DIAGNOSIS — F32.4 MAJOR DEPRESSIVE DISORDER WITH SINGLE EPISODE, IN PARTIAL REMISSION: ICD-10-CM

## 2023-12-20 DIAGNOSIS — I10 PRIMARY HYPERTENSION: Primary | ICD-10-CM

## 2023-12-20 DIAGNOSIS — N39.0 ACUTE UTI: ICD-10-CM

## 2023-12-20 PROCEDURE — 1160F RVW MEDS BY RX/DR IN RCRD: CPT | Performed by: FAMILY MEDICINE

## 2023-12-20 PROCEDURE — 3078F DIAST BP <80 MM HG: CPT | Performed by: FAMILY MEDICINE

## 2023-12-20 PROCEDURE — 99214 OFFICE O/P EST MOD 30 MIN: CPT | Performed by: FAMILY MEDICINE

## 2023-12-20 PROCEDURE — 3075F SYST BP GE 130 - 139MM HG: CPT | Performed by: FAMILY MEDICINE

## 2023-12-20 PROCEDURE — 1159F MED LIST DOCD IN RCRD: CPT | Performed by: FAMILY MEDICINE

## 2023-12-20 NOTE — PROGRESS NOTES
Subjective   Gita Avelar is a 78 y.o. female. Presents today for   Chief Complaint   Patient presents with    Hypertension    Urinary Tract Infection    Depression       History of Present Illness  Patient 77 y/o female with htn, adjusted recently and doing well;   Last ov mood down and increased duloxetine and helping;  Has some back pain and feels like bladder infection;  no frequency or dysuria;  no hematuria;  no abd pain no n/v;      Review of Systems   Respiratory:  Negative for shortness of breath and wheezing.    Cardiovascular:  Negative for chest pain and palpitations.   Gastrointestinal:  Negative for abdominal pain.   Musculoskeletal:  Positive for back pain.   Neurological:  Positive for light-headedness.       Patient Active Problem List   Diagnosis    Avitaminosis D    B12 deficiency    Near syncope    Atrophic vaginitis    Awareness of heartbeats    OP (osteoporosis)    HLD (hyperlipidemia)    Fatigue    Essential hypertension    Moderate episode of recurrent major depressive disorder    Nipple discharge    Abnormal ultrasound of breast    Abnormal mammogram    Lump or mass in breast    Family history of malignant neoplasm of pancreas    Atherosclerosis of both carotid arteries    Acute UTI    COVID-19 virus infection    Hypoxia    Streptococcal septicemia    Generalized weakness    Dizziness on standing    History of Guillain-Hilham syndrome    History of subdural hematoma    Dysautonomia    Cytokine release syndrome, grade 1       Social History     Socioeconomic History    Marital status:    Tobacco Use    Smoking status: Former     Packs/day: 0.50     Years: 25.00     Additional pack years: 0.00     Total pack years: 12.50     Types: Cigarettes     Start date:      Quit date: 2021     Years since quittin.4    Smokeless tobacco: Never   Vaping Use    Vaping Use: Never used   Substance and Sexual Activity    Alcohol use: Yes     Comment: occasionally     Drug use: No     "Sexual activity: Not Currently     Partners: Male       No Known Allergies    Current Outpatient Medications on File Prior to Visit   Medication Sig Dispense Refill    acetaminophen (TYLENOL) 500 MG tablet Take 2 tablets by mouth Every 6 (Six) Hours As Needed for Mild Pain. Indications: Pain      amLODIPine (NORVASC) 5 MG tablet Take 1 tablet by mouth Daily. 30 tablet 5    aspirin 81 MG tablet Take 1 tablet by mouth Daily. Indications: Disease involving Lipid Deposits in the Arteries      bisacodyl (DULCOLAX) 5 MG EC tablet Take 1 tablet by mouth Daily As Needed for Constipation. Indications: Constipation      carvedilol (COREG) 25 MG tablet Take 1 tablet by mouth 2 (Two) Times a Day With Meals. Indications: High Blood Pressure Disorder 180 tablet 3    cholecalciferol (VITAMIN D3) 1000 UNITS tablet Take 1 tablet by mouth Daily. Indications: Vitamin D Deficiency      DULoxetine (CYMBALTA) 60 MG capsule Take 60mg + 30mg cap 1 each together PO daily 90 capsule 1    lisinopril (PRINIVIL,ZESTRIL) 40 MG tablet Take 1 tablet by mouth Daily. Indications: High Blood Pressure Disorder 90 tablet 3    Multiple Vitamins-Minerals (PRESERVISION AREDS PO) Take 1 tablet by mouth Daily. Indications: Vitamin Deficiency      rosuvastatin (Crestor) 20 MG tablet Take 1 tablet by mouth Every Night. Indications: High Amount of Fats in the Blood 90 tablet 3    DULoxetine (CYMBALTA) 30 MG capsule Take 30mg + 60mg 1 po cap daily (Patient not taking: Reported on 12/20/2023) 90 capsule 1     No current facility-administered medications on file prior to visit.       Objective   Vitals:    12/20/23 1207   BP: 132/76   Pulse: 74   SpO2: 93%   Weight: 65.8 kg (145 lb)   Height: 162.6 cm (64\")     Body mass index is 24.89 kg/m².    Physical Exam  Vitals and nursing note reviewed.   Constitutional:       Appearance: She is well-developed.   HENT:      Head: Normocephalic and atraumatic.   Neck:      Thyroid: No thyromegaly.      Vascular: No JVD. "   Cardiovascular:      Rate and Rhythm: Normal rate and regular rhythm.      Heart sounds: Normal heart sounds. No murmur heard.     No friction rub. No gallop.   Pulmonary:      Effort: Pulmonary effort is normal. No respiratory distress.      Breath sounds: Normal breath sounds. No wheezing or rales.   Abdominal:      General: Bowel sounds are normal. There is no distension.      Palpations: Abdomen is soft.      Tenderness: There is no abdominal tenderness. There is no guarding or rebound.   Musculoskeletal:      Cervical back: Neck supple.   Skin:     General: Skin is warm and dry.   Neurological:      Mental Status: She is alert.   Psychiatric:         Behavior: Behavior normal.         Assessment & Plan   Diagnoses and all orders for this visit:    1. Primary hypertension (Primary)    2. Acute UTI  -     Urinalysis With Microscopic - Urine, Clean Catch  -     Urine Culture - , Urine, Clean Catch    3. Major depressive disorder with single episode, in partial remission    -hypertension - controlled, continue medications  -depression improving and will contniue duloxetine  -ok check urine studies, hold abx for now           BMI is within normal parameters. No other follow-up for BMI required.     -Follow up: 6 months and prn

## 2023-12-22 LAB
APPEARANCE UR: CLEAR
BACTERIA #/AREA URNS HPF: NORMAL /[HPF]
BACTERIA UR CULT: NORMAL
BACTERIA UR CULT: NORMAL
BILIRUB UR QL STRIP: NEGATIVE
CASTS URNS QL MICRO: NORMAL /LPF
COLOR UR: YELLOW
EPI CELLS #/AREA URNS HPF: NORMAL /HPF (ref 0–10)
GLUCOSE UR QL STRIP: NEGATIVE
HGB UR QL STRIP: NEGATIVE
KETONES UR QL STRIP: NEGATIVE
LEUKOCYTE ESTERASE UR QL STRIP: ABNORMAL
MICRO URNS: ABNORMAL
NITRITE UR QL STRIP: NEGATIVE
PH UR STRIP: 7.5 [PH] (ref 5–7.5)
PROT UR QL STRIP: NEGATIVE
RBC #/AREA URNS HPF: NORMAL /HPF (ref 0–2)
SP GR UR STRIP: 1.01 (ref 1–1.03)
UROBILINOGEN UR STRIP-MCNC: 0.2 MG/DL (ref 0.2–1)
WBC #/AREA URNS HPF: NORMAL /HPF (ref 0–5)

## 2024-01-12 ENCOUNTER — HOSPITAL ENCOUNTER (INPATIENT)
Facility: HOSPITAL | Age: 79
LOS: 4 days | Discharge: NURSING, REHAB OR LONG-TERM CARE | End: 2024-01-16
Attending: EMERGENCY MEDICINE | Admitting: HOSPITALIST
Payer: MEDICARE

## 2024-01-12 ENCOUNTER — APPOINTMENT (OUTPATIENT)
Dept: CT IMAGING | Facility: HOSPITAL | Age: 79
End: 2024-01-12
Payer: MEDICARE

## 2024-01-12 ENCOUNTER — APPOINTMENT (OUTPATIENT)
Dept: CARDIOLOGY | Facility: HOSPITAL | Age: 79
End: 2024-01-12
Payer: MEDICARE

## 2024-01-12 ENCOUNTER — APPOINTMENT (OUTPATIENT)
Dept: GENERAL RADIOLOGY | Facility: HOSPITAL | Age: 79
End: 2024-01-12
Payer: MEDICARE

## 2024-01-12 DIAGNOSIS — R06.02 SHORTNESS OF BREATH: ICD-10-CM

## 2024-01-12 DIAGNOSIS — R07.9 CHEST PAIN, UNSPECIFIED TYPE: ICD-10-CM

## 2024-01-12 DIAGNOSIS — R55 SYNCOPE AND COLLAPSE: Primary | ICD-10-CM

## 2024-01-12 DIAGNOSIS — R79.89 ELEVATED TROPONIN: ICD-10-CM

## 2024-01-12 DIAGNOSIS — I48.0 PAROXYSMAL ATRIAL FIBRILLATION: ICD-10-CM

## 2024-01-12 PROBLEM — I48.91 ATRIAL FIBRILLATION WITH RVR: Status: ACTIVE | Noted: 2024-01-12

## 2024-01-12 PROBLEM — M25.562 LEFT KNEE PAIN: Status: ACTIVE | Noted: 2024-01-12

## 2024-01-12 PROBLEM — M54.2 NECK PAIN ON LEFT SIDE: Status: ACTIVE | Noted: 2024-01-12

## 2024-01-12 LAB
ALBUMIN SERPL-MCNC: 3.6 G/DL (ref 3.5–5.2)
ALBUMIN/GLOB SERPL: 1.5 G/DL
ALP SERPL-CCNC: 73 U/L (ref 39–117)
ALT SERPL W P-5'-P-CCNC: 10 U/L (ref 1–33)
ANION GAP SERPL CALCULATED.3IONS-SCNC: 10.2 MMOL/L (ref 5–15)
ANION GAP SERPL CALCULATED.3IONS-SCNC: 7.5 MMOL/L (ref 5–15)
ASCENDING AORTA: 3.2 CM
AST SERPL-CCNC: 15 U/L (ref 1–32)
BACTERIA UR QL AUTO: ABNORMAL /HPF
BASOPHILS # BLD AUTO: 0.04 10*3/MM3 (ref 0–0.2)
BASOPHILS # BLD AUTO: 0.06 10*3/MM3 (ref 0–0.2)
BASOPHILS NFR BLD AUTO: 0.5 % (ref 0–1.5)
BASOPHILS NFR BLD AUTO: 0.8 % (ref 0–1.5)
BH CV ECHO MEAS - ACS: 2.07 CM
BH CV ECHO MEAS - AO MAX PG: 4 MMHG
BH CV ECHO MEAS - AO MEAN PG: 2.01 MMHG
BH CV ECHO MEAS - AO ROOT DIAM: 3.5 CM
BH CV ECHO MEAS - AO V2 MAX: 100.3 CM/SEC
BH CV ECHO MEAS - AO V2 VTI: 22.1 CM
BH CV ECHO MEAS - AVA(I,D): 2.9 CM2
BH CV ECHO MEAS - EDV(CUBED): 66 ML
BH CV ECHO MEAS - EDV(MOD-SP2): 48 ML
BH CV ECHO MEAS - EDV(MOD-SP4): 53 ML
BH CV ECHO MEAS - EF(MOD-BP): 62.2 %
BH CV ECHO MEAS - EF(MOD-SP2): 62.5 %
BH CV ECHO MEAS - EF(MOD-SP4): 66 %
BH CV ECHO MEAS - ESV(CUBED): 12 ML
BH CV ECHO MEAS - ESV(MOD-SP2): 18 ML
BH CV ECHO MEAS - ESV(MOD-SP4): 18 ML
BH CV ECHO MEAS - FS: 43.3 %
BH CV ECHO MEAS - IVS/LVPW: 1.06 CM
BH CV ECHO MEAS - IVSD: 1.04 CM
BH CV ECHO MEAS - LAT PEAK E' VEL: 8.2 CM/SEC
BH CV ECHO MEAS - LV MASS(C)D: 130.3 GRAMS
BH CV ECHO MEAS - LV MAX PG: 2.44 MMHG
BH CV ECHO MEAS - LV MEAN PG: 1.31 MMHG
BH CV ECHO MEAS - LV V1 MAX: 78.1 CM/SEC
BH CV ECHO MEAS - LV V1 VTI: 18.2 CM
BH CV ECHO MEAS - LVIDD: 4 CM
BH CV ECHO MEAS - LVIDS: 2.29 CM
BH CV ECHO MEAS - LVOT AREA: 3.5 CM2
BH CV ECHO MEAS - LVOT DIAM: 2.11 CM
BH CV ECHO MEAS - LVPWD: 0.98 CM
BH CV ECHO MEAS - MED PEAK E' VEL: 7.3 CM/SEC
BH CV ECHO MEAS - MV A DUR: 0.16 SEC
BH CV ECHO MEAS - MV A MAX VEL: 50.3 CM/SEC
BH CV ECHO MEAS - MV DEC SLOPE: 173.2 CM/SEC2
BH CV ECHO MEAS - MV DEC TIME: 0.27 SEC
BH CV ECHO MEAS - MV E MAX VEL: 54.5 CM/SEC
BH CV ECHO MEAS - MV E/A: 1.08
BH CV ECHO MEAS - MV MAX PG: 1.69 MMHG
BH CV ECHO MEAS - MV MEAN PG: 0.73 MMHG
BH CV ECHO MEAS - MV P1/2T: 100.4 MSEC
BH CV ECHO MEAS - MV V2 VTI: 21.1 CM
BH CV ECHO MEAS - MVA(P1/2T): 2.19 CM2
BH CV ECHO MEAS - MVA(VTI): 3 CM2
BH CV ECHO MEAS - PA ACC TIME: 0.2 SEC
BH CV ECHO MEAS - PA V2 MAX: 62.6 CM/SEC
BH CV ECHO MEAS - PULM A REVS DUR: 0.12 SEC
BH CV ECHO MEAS - PULM A REVS VEL: 29.9 CM/SEC
BH CV ECHO MEAS - PULM DIAS VEL: 46.1 CM/SEC
BH CV ECHO MEAS - PULM S/D: 0.93
BH CV ECHO MEAS - PULM SYS VEL: 43 CM/SEC
BH CV ECHO MEAS - RAP SYSTOLE: 3 MMHG
BH CV ECHO MEAS - RV MAX PG: 1.21 MMHG
BH CV ECHO MEAS - RV V1 MAX: 55 CM/SEC
BH CV ECHO MEAS - RV V1 VTI: 13.2 CM
BH CV ECHO MEAS - RVSP: 32 MMHG
BH CV ECHO MEAS - SV(LVOT): 63.7 ML
BH CV ECHO MEAS - SV(MOD-SP2): 30 ML
BH CV ECHO MEAS - SV(MOD-SP4): 35 ML
BH CV ECHO MEAS - TAPSE (>1.6): 1.87 CM
BH CV ECHO MEAS - TR MAX PG: 29.7 MMHG
BH CV ECHO MEAS - TR MAX VEL: 272.3 CM/SEC
BH CV ECHO MEASUREMENTS AVERAGE E/E' RATIO: 7.03
BH CV XLRA - RV BASE: 2.6 CM
BH CV XLRA - RV LENGTH: 6 CM
BH CV XLRA - RV MID: 2.35 CM
BH CV XLRA - TDI S': 10.9 CM/SEC
BILIRUB SERPL-MCNC: 0.4 MG/DL (ref 0–1.2)
BILIRUB UR QL STRIP: NEGATIVE
BUN SERPL-MCNC: 16 MG/DL (ref 8–23)
BUN SERPL-MCNC: 20 MG/DL (ref 8–23)
BUN/CREAT SERPL: 22.5 (ref 7–25)
BUN/CREAT SERPL: 25.3 (ref 7–25)
CALCIUM SPEC-SCNC: 8.7 MG/DL (ref 8.6–10.5)
CALCIUM SPEC-SCNC: 8.9 MG/DL (ref 8.6–10.5)
CHLORIDE SERPL-SCNC: 108 MMOL/L (ref 98–107)
CHLORIDE SERPL-SCNC: 113 MMOL/L (ref 98–107)
CHOLEST SERPL-MCNC: 133 MG/DL (ref 0–200)
CLARITY UR: CLEAR
CO2 SERPL-SCNC: 23.5 MMOL/L (ref 22–29)
CO2 SERPL-SCNC: 24.8 MMOL/L (ref 22–29)
COLOR UR: YELLOW
CREAT SERPL-MCNC: 0.71 MG/DL (ref 0.57–1)
CREAT SERPL-MCNC: 0.79 MG/DL (ref 0.57–1)
DEPRECATED RDW RBC AUTO: 41.1 FL (ref 37–54)
DEPRECATED RDW RBC AUTO: 41.3 FL (ref 37–54)
EGFRCR SERPLBLD CKD-EPI 2021: 76.7 ML/MIN/1.73
EGFRCR SERPLBLD CKD-EPI 2021: 87.2 ML/MIN/1.73
EOSINOPHIL # BLD AUTO: 0.02 10*3/MM3 (ref 0–0.4)
EOSINOPHIL # BLD AUTO: 0.08 10*3/MM3 (ref 0–0.4)
EOSINOPHIL NFR BLD AUTO: 0.3 % (ref 0.3–6.2)
EOSINOPHIL NFR BLD AUTO: 1 % (ref 0.3–6.2)
ERYTHROCYTE [DISTWIDTH] IN BLOOD BY AUTOMATED COUNT: 12.1 % (ref 12.3–15.4)
ERYTHROCYTE [DISTWIDTH] IN BLOOD BY AUTOMATED COUNT: 12.1 % (ref 12.3–15.4)
GEN 5 2HR TROPONIN T REFLEX: 22 NG/L
GLOBULIN UR ELPH-MCNC: 2.4 GM/DL
GLUCOSE SERPL-MCNC: 100 MG/DL (ref 65–99)
GLUCOSE SERPL-MCNC: 107 MG/DL (ref 65–99)
GLUCOSE UR STRIP-MCNC: NEGATIVE MG/DL
HCT VFR BLD AUTO: 39.2 % (ref 34–46.6)
HCT VFR BLD AUTO: 41.7 % (ref 34–46.6)
HDLC SERPL-MCNC: 39 MG/DL (ref 40–60)
HGB BLD-MCNC: 12.9 G/DL (ref 12–15.9)
HGB BLD-MCNC: 13.7 G/DL (ref 12–15.9)
HGB UR QL STRIP.AUTO: NEGATIVE
HOLD SPECIMEN: NORMAL
HOLD SPECIMEN: NORMAL
HYALINE CASTS UR QL AUTO: ABNORMAL /LPF
IMM GRANULOCYTES # BLD AUTO: 0.02 10*3/MM3 (ref 0–0.05)
IMM GRANULOCYTES # BLD AUTO: 0.03 10*3/MM3 (ref 0–0.05)
IMM GRANULOCYTES NFR BLD AUTO: 0.3 % (ref 0–0.5)
IMM GRANULOCYTES NFR BLD AUTO: 0.4 % (ref 0–0.5)
INR PPP: 1.03 (ref 0.9–1.1)
KETONES UR QL STRIP: NEGATIVE
LDLC SERPL CALC-MCNC: 82 MG/DL (ref 0–100)
LDLC/HDLC SERPL: 2.11 {RATIO}
LEFT ATRIUM VOLUME INDEX: 17.6 ML/M2
LEUKOCYTE ESTERASE UR QL STRIP.AUTO: ABNORMAL
LYMPHOCYTES # BLD AUTO: 1.75 10*3/MM3 (ref 0.7–3.1)
LYMPHOCYTES # BLD AUTO: 1.82 10*3/MM3 (ref 0.7–3.1)
LYMPHOCYTES NFR BLD AUTO: 23 % (ref 19.6–45.3)
LYMPHOCYTES NFR BLD AUTO: 23.9 % (ref 19.6–45.3)
MAGNESIUM SERPL-MCNC: 1.9 MG/DL (ref 1.6–2.4)
MCH RBC QN AUTO: 30.4 PG (ref 26.6–33)
MCH RBC QN AUTO: 30.6 PG (ref 26.6–33)
MCHC RBC AUTO-ENTMCNC: 32.9 G/DL (ref 31.5–35.7)
MCHC RBC AUTO-ENTMCNC: 32.9 G/DL (ref 31.5–35.7)
MCV RBC AUTO: 92.5 FL (ref 79–97)
MCV RBC AUTO: 93.1 FL (ref 79–97)
MONOCYTES # BLD AUTO: 0.59 10*3/MM3 (ref 0.1–0.9)
MONOCYTES # BLD AUTO: 0.73 10*3/MM3 (ref 0.1–0.9)
MONOCYTES NFR BLD AUTO: 8.1 % (ref 5–12)
MONOCYTES NFR BLD AUTO: 9.2 % (ref 5–12)
NEUTROPHILS NFR BLD AUTO: 4.89 10*3/MM3 (ref 1.7–7)
NEUTROPHILS NFR BLD AUTO: 5.19 10*3/MM3 (ref 1.7–7)
NEUTROPHILS NFR BLD AUTO: 65.6 % (ref 42.7–76)
NEUTROPHILS NFR BLD AUTO: 66.9 % (ref 42.7–76)
NITRITE UR QL STRIP: NEGATIVE
NRBC BLD AUTO-RTO: 0 /100 WBC (ref 0–0.2)
NRBC BLD AUTO-RTO: 0 /100 WBC (ref 0–0.2)
PH UR STRIP.AUTO: 7.5 [PH] (ref 5–8)
PLATELET # BLD AUTO: 151 10*3/MM3 (ref 140–450)
PLATELET # BLD AUTO: 163 10*3/MM3 (ref 140–450)
PMV BLD AUTO: 9 FL (ref 6–12)
PMV BLD AUTO: 9.2 FL (ref 6–12)
POTASSIUM SERPL-SCNC: 4.1 MMOL/L (ref 3.5–5.2)
POTASSIUM SERPL-SCNC: 4.3 MMOL/L (ref 3.5–5.2)
PROT SERPL-MCNC: 6 G/DL (ref 6–8.5)
PROT UR QL STRIP: NEGATIVE
PROTHROMBIN TIME: 13.6 SECONDS (ref 11.7–14.2)
QT INTERVAL: 422 MS
QTC INTERVAL: 459 MS
RBC # BLD AUTO: 4.21 10*6/MM3 (ref 3.77–5.28)
RBC # BLD AUTO: 4.51 10*6/MM3 (ref 3.77–5.28)
RBC # UR STRIP: ABNORMAL /HPF
REF LAB TEST METHOD: ABNORMAL
SINUS: 3.3 CM
SODIUM SERPL-SCNC: 143 MMOL/L (ref 136–145)
SODIUM SERPL-SCNC: 144 MMOL/L (ref 136–145)
SP GR UR STRIP: 1.01 (ref 1–1.03)
SQUAMOUS #/AREA URNS HPF: ABNORMAL /HPF
STJ: 3 CM
TRIGL SERPL-MCNC: 58 MG/DL (ref 0–150)
TROPONIN T DELTA: 2 NG/L
TROPONIN T SERPL HS-MCNC: 19 NG/L
TROPONIN T SERPL HS-MCNC: 20 NG/L
UROBILINOGEN UR QL STRIP: ABNORMAL
VLDLC SERPL-MCNC: 12 MG/DL (ref 5–40)
WBC # UR STRIP: ABNORMAL /HPF
WBC NRBC COR # BLD AUTO: 7.31 10*3/MM3 (ref 3.4–10.8)
WBC NRBC COR # BLD AUTO: 7.91 10*3/MM3 (ref 3.4–10.8)
WHOLE BLOOD HOLD COAG: NORMAL
WHOLE BLOOD HOLD SPECIMEN: NORMAL

## 2024-01-12 PROCEDURE — 93010 ELECTROCARDIOGRAM REPORT: CPT | Performed by: INTERNAL MEDICINE

## 2024-01-12 PROCEDURE — 25010000002 ONDANSETRON PER 1 MG: Performed by: NURSE PRACTITIONER

## 2024-01-12 PROCEDURE — 99204 OFFICE O/P NEW MOD 45 MIN: CPT | Performed by: INTERNAL MEDICINE

## 2024-01-12 PROCEDURE — 85610 PROTHROMBIN TIME: CPT | Performed by: NURSE PRACTITIONER

## 2024-01-12 PROCEDURE — 25810000003 SODIUM CHLORIDE 0.9 % SOLUTION: Performed by: EMERGENCY MEDICINE

## 2024-01-12 PROCEDURE — 71045 X-RAY EXAM CHEST 1 VIEW: CPT

## 2024-01-12 PROCEDURE — 93005 ELECTROCARDIOGRAM TRACING: CPT

## 2024-01-12 PROCEDURE — 85025 COMPLETE CBC W/AUTO DIFF WBC: CPT | Performed by: NURSE PRACTITIONER

## 2024-01-12 PROCEDURE — 93306 TTE W/DOPPLER COMPLETE: CPT | Performed by: INTERNAL MEDICINE

## 2024-01-12 PROCEDURE — 25010000002 ENOXAPARIN PER 10 MG: Performed by: INTERNAL MEDICINE

## 2024-01-12 PROCEDURE — 84484 ASSAY OF TROPONIN QUANT: CPT | Performed by: INTERNAL MEDICINE

## 2024-01-12 PROCEDURE — 93005 ELECTROCARDIOGRAM TRACING: CPT | Performed by: EMERGENCY MEDICINE

## 2024-01-12 PROCEDURE — 85025 COMPLETE CBC W/AUTO DIFF WBC: CPT

## 2024-01-12 PROCEDURE — 99285 EMERGENCY DEPT VISIT HI MDM: CPT

## 2024-01-12 PROCEDURE — 83735 ASSAY OF MAGNESIUM: CPT

## 2024-01-12 PROCEDURE — 93306 TTE W/DOPPLER COMPLETE: CPT

## 2024-01-12 PROCEDURE — 72125 CT NECK SPINE W/O DYE: CPT

## 2024-01-12 PROCEDURE — 70450 CT HEAD/BRAIN W/O DYE: CPT

## 2024-01-12 PROCEDURE — 25510000001 PERFLUTREN (DEFINITY) 8.476 MG IN SODIUM CHLORIDE (PF) 0.9 % 10 ML INJECTION: Performed by: INTERNAL MEDICINE

## 2024-01-12 PROCEDURE — 73560 X-RAY EXAM OF KNEE 1 OR 2: CPT

## 2024-01-12 PROCEDURE — 81001 URINALYSIS AUTO W/SCOPE: CPT | Performed by: EMERGENCY MEDICINE

## 2024-01-12 PROCEDURE — 25810000003 SODIUM CHLORIDE 0.9 % SOLUTION: Performed by: NURSE PRACTITIONER

## 2024-01-12 PROCEDURE — 36415 COLL VENOUS BLD VENIPUNCTURE: CPT

## 2024-01-12 PROCEDURE — 84484 ASSAY OF TROPONIN QUANT: CPT

## 2024-01-12 PROCEDURE — 80053 COMPREHEN METABOLIC PANEL: CPT

## 2024-01-12 PROCEDURE — 80061 LIPID PANEL: CPT | Performed by: INTERNAL MEDICINE

## 2024-01-12 RX ORDER — ASPIRIN 81 MG/1
81 TABLET ORAL DAILY
Status: DISCONTINUED | OUTPATIENT
Start: 2024-01-12 | End: 2024-01-13

## 2024-01-12 RX ORDER — ACETAMINOPHEN 160 MG/5ML
650 SOLUTION ORAL EVERY 4 HOURS PRN
Status: DISCONTINUED | OUTPATIENT
Start: 2024-01-12 | End: 2024-01-16 | Stop reason: HOSPADM

## 2024-01-12 RX ORDER — BISACODYL 10 MG
10 SUPPOSITORY, RECTAL RECTAL DAILY PRN
Status: DISCONTINUED | OUTPATIENT
Start: 2024-01-12 | End: 2024-01-13

## 2024-01-12 RX ORDER — LISINOPRIL 40 MG/1
40 TABLET ORAL DAILY
Status: DISCONTINUED | OUTPATIENT
Start: 2024-01-12 | End: 2024-01-16 | Stop reason: HOSPADM

## 2024-01-12 RX ORDER — SODIUM CHLORIDE 9 MG/ML
100 INJECTION, SOLUTION INTRAVENOUS CONTINUOUS
Status: DISCONTINUED | OUTPATIENT
Start: 2024-01-12 | End: 2024-01-13

## 2024-01-12 RX ORDER — ROSUVASTATIN CALCIUM 20 MG/1
20 TABLET, COATED ORAL NIGHTLY
Status: DISCONTINUED | OUTPATIENT
Start: 2024-01-12 | End: 2024-01-12

## 2024-01-12 RX ORDER — AMLODIPINE BESYLATE 5 MG/1
5 TABLET ORAL DAILY
Status: DISCONTINUED | OUTPATIENT
Start: 2024-01-12 | End: 2024-01-12

## 2024-01-12 RX ORDER — AMOXICILLIN 250 MG
2 CAPSULE ORAL 2 TIMES DAILY
Status: DISCONTINUED | OUTPATIENT
Start: 2024-01-12 | End: 2024-01-13

## 2024-01-12 RX ORDER — SODIUM CHLORIDE 0.9 % (FLUSH) 0.9 %
10 SYRINGE (ML) INJECTION AS NEEDED
Status: DISCONTINUED | OUTPATIENT
Start: 2024-01-12 | End: 2024-01-16 | Stop reason: HOSPADM

## 2024-01-12 RX ORDER — NITROGLYCERIN 0.4 MG/1
0.4 TABLET SUBLINGUAL
Status: DISCONTINUED | OUTPATIENT
Start: 2024-01-12 | End: 2024-01-16 | Stop reason: HOSPADM

## 2024-01-12 RX ORDER — ROSUVASTATIN CALCIUM 40 MG/1
40 TABLET, COATED ORAL NIGHTLY
Status: DISCONTINUED | OUTPATIENT
Start: 2024-01-12 | End: 2024-01-13

## 2024-01-12 RX ORDER — BISACODYL 5 MG/1
5 TABLET, DELAYED RELEASE ORAL DAILY PRN
Status: DISCONTINUED | OUTPATIENT
Start: 2024-01-12 | End: 2024-01-13

## 2024-01-12 RX ORDER — ACETAMINOPHEN 650 MG/1
650 SUPPOSITORY RECTAL EVERY 4 HOURS PRN
Status: DISCONTINUED | OUTPATIENT
Start: 2024-01-12 | End: 2024-01-16 | Stop reason: HOSPADM

## 2024-01-12 RX ORDER — CARVEDILOL 25 MG/1
25 TABLET ORAL 2 TIMES DAILY WITH MEALS
Status: DISCONTINUED | OUTPATIENT
Start: 2024-01-12 | End: 2024-01-13

## 2024-01-12 RX ORDER — SODIUM CHLORIDE 9 MG/ML
40 INJECTION, SOLUTION INTRAVENOUS AS NEEDED
Status: DISCONTINUED | OUTPATIENT
Start: 2024-01-12 | End: 2024-01-16 | Stop reason: HOSPADM

## 2024-01-12 RX ORDER — ONDANSETRON 2 MG/ML
4 INJECTION INTRAMUSCULAR; INTRAVENOUS EVERY 6 HOURS PRN
Status: DISCONTINUED | OUTPATIENT
Start: 2024-01-12 | End: 2024-01-13 | Stop reason: SDUPTHER

## 2024-01-12 RX ORDER — ENOXAPARIN SODIUM 100 MG/ML
1 INJECTION SUBCUTANEOUS EVERY 12 HOURS
Status: DISCONTINUED | OUTPATIENT
Start: 2024-01-12 | End: 2024-01-16 | Stop reason: HOSPADM

## 2024-01-12 RX ORDER — ACETAMINOPHEN 325 MG/1
650 TABLET ORAL EVERY 4 HOURS PRN
Status: DISCONTINUED | OUTPATIENT
Start: 2024-01-12 | End: 2024-01-16 | Stop reason: HOSPADM

## 2024-01-12 RX ORDER — SODIUM CHLORIDE 0.9 % (FLUSH) 0.9 %
10 SYRINGE (ML) INJECTION EVERY 12 HOURS SCHEDULED
Status: DISCONTINUED | OUTPATIENT
Start: 2024-01-12 | End: 2024-01-16 | Stop reason: HOSPADM

## 2024-01-12 RX ORDER — POLYETHYLENE GLYCOL 3350 17 G/17G
17 POWDER, FOR SOLUTION ORAL DAILY PRN
Status: DISCONTINUED | OUTPATIENT
Start: 2024-01-12 | End: 2024-01-13

## 2024-01-12 RX ORDER — HYDROCODONE BITARTRATE AND ACETAMINOPHEN 5; 325 MG/1; MG/1
1 TABLET ORAL EVERY 6 HOURS PRN
Status: DISCONTINUED | OUTPATIENT
Start: 2024-01-12 | End: 2024-01-16

## 2024-01-12 RX ADMIN — PERFLUTREN 2 ML: 6.52 INJECTION, SUSPENSION INTRAVENOUS at 11:31

## 2024-01-12 RX ADMIN — ACETAMINOPHEN 650 MG: 325 TABLET, FILM COATED ORAL at 09:21

## 2024-01-12 RX ADMIN — SODIUM CHLORIDE 1000 ML: 9 INJECTION, SOLUTION INTRAVENOUS at 03:42

## 2024-01-12 RX ADMIN — SODIUM CHLORIDE 100 ML/HR: 9 INJECTION, SOLUTION INTRAVENOUS at 06:05

## 2024-01-12 RX ADMIN — HYDROCODONE BITARTRATE AND ACETAMINOPHEN 1 TABLET: 5; 325 TABLET ORAL at 22:05

## 2024-01-12 RX ADMIN — SODIUM CHLORIDE 100 ML/HR: 9 INJECTION, SOLUTION INTRAVENOUS at 22:01

## 2024-01-12 RX ADMIN — DULOXETINE HYDROCHLORIDE 90 MG: 60 CAPSULE, DELAYED RELEASE ORAL at 13:46

## 2024-01-12 RX ADMIN — CARVEDILOL 25 MG: 25 TABLET, FILM COATED ORAL at 13:47

## 2024-01-12 RX ADMIN — ASPIRIN 81 MG: 81 TABLET, COATED ORAL at 13:58

## 2024-01-12 RX ADMIN — DOCUSATE SODIUM 50MG AND SENNOSIDES 8.6MG 2 TABLET: 8.6; 5 TABLET, FILM COATED ORAL at 20:45

## 2024-01-12 RX ADMIN — CARVEDILOL 25 MG: 25 TABLET, FILM COATED ORAL at 20:09

## 2024-01-12 RX ADMIN — LISINOPRIL 40 MG: 40 TABLET ORAL at 13:46

## 2024-01-12 RX ADMIN — ROSUVASTATIN CALCIUM 40 MG: 40 TABLET, FILM COATED ORAL at 20:09

## 2024-01-12 RX ADMIN — ENOXAPARIN SODIUM 70 MG: 100 INJECTION SUBCUTANEOUS at 13:47

## 2024-01-12 RX ADMIN — Medication 10 ML: at 20:47

## 2024-01-12 RX ADMIN — HYDROCODONE BITARTRATE AND ACETAMINOPHEN 1 TABLET: 5; 325 TABLET ORAL at 13:46

## 2024-01-12 RX ADMIN — ONDANSETRON HYDROCHLORIDE 4 MG: 2 INJECTION, SOLUTION INTRAMUSCULAR; INTRAVENOUS at 20:13

## 2024-01-12 RX ADMIN — ACETAMINOPHEN 650 MG: 325 TABLET, FILM COATED ORAL at 20:08

## 2024-01-12 RX ADMIN — ONDANSETRON HYDROCHLORIDE 4 MG: 2 INJECTION, SOLUTION INTRAMUSCULAR; INTRAVENOUS at 13:54

## 2024-01-12 NOTE — H&P
Patient Name:  Gita Avelar  YOB: 1945  MRN:  [id number]  Admit Date:  1/12/2024  Patient Care Team:  Mark Hernández DO as PCP - General (Family Medicine)      Subjective   History Present Illness     Chief Complaint   Patient presents with    Syncope    Vomiting     History of Present Illness  Ms. Avelar is a 78 y.o. smoker with a history of hypertension, hyperlipidemia, carotid stenosis and dysautonomia that presents to [facility] complaining of syncope.  Patient states that she was in her usual state of health until the day prior to admission.  She states that she did feel little unwell and was sleeping more that day.  When she awoke, she did have a new onset of some left-sided pressure as well as felt hot and diaphoretic.  She felt weak and a little dizzy as well and thought she needed to eat something.  She proceeded to have something to eat with no improvement in her symptoms so decided to get some water in the kitchen.  While in the kitchen she felt dizzy and like everything was going black and ultimately passed out hitting the left side of her head as well as her left knee.  She was able to come to and crawled into the living room in order to call her son.  She states she did vomit once after hitting her head, but denies any further nausea or vomiting.  She denies any further chest pain or pressure as well as trouble breathing.  She denies any recent cough, fever or chills.  She states she does continue to smoke 4 cigarettes a day and lives alone at baseline.  She states that her balance is not very good in general, but that she does not normally pass out.  She states she did have 1 episode of some slight left-sided chest pressure a couple days prior to this event that subsided quickly.  Denies any recent new urgency, frequency or dysuria.  She states that she has been eating and drinking per her normal prior to this event.  She denies any recent increased swelling  in her legs.   In the ED, she was afebrile and hemodynamically stable.  EMS reports that she was in A-fib with RVR when they arrived, but was normal sinus rhythm when she arrived to the hospital.  Lab work revealed a WBC of 7.91 hemoglobin 13.7, high-sensitivity troponin 19, creatinine normal with stable electrolytes.  Her UA was negative except 11-20 WBC.  Chest x-ray was negative for any acute findings as well as CT head.  She does report some headache as well as left-sided neck discomfort and left knee pain at this time.  She states her knee pain is worse with bending and she has not tried to bear weight yet.    Review of Systems   Constitutional:  Positive for diaphoresis and fatigue. Negative for appetite change, chills and fever.   HENT:  Negative for congestion and ear pain.    Respiratory:  Negative for cough and shortness of breath.    Cardiovascular:  Positive for chest pain and palpitations. Negative for leg swelling.   Gastrointestinal:  Positive for constipation, nausea and vomiting. Negative for abdominal pain and diarrhea.   Genitourinary:  Negative for difficulty urinating and dysuria.   Musculoskeletal:  Positive for arthralgias. Negative for back pain and gait problem.   Skin:  Negative for color change and pallor.   Neurological:  Positive for dizziness, syncope, weakness and light-headedness.   Psychiatric/Behavioral:  Negative for confusion. The patient is not nervous/anxious.       Personal History     Past Medical History:   Diagnosis Date    Arthritis     HANDS    B12 deficiency     Bladder prolapse, female, acquired     Depression     Diverticulitis of colon     History of Guillain-[place] syndrome     History of subdural hematoma     Hyperlipidemia     Macular degeneration of left eye     Osteoporosis     Stress incontinence     Vitamin D deficiency      Past Surgical History:   Procedure Laterality Date    BREAST BIOPSY      BREAST BIOPSY Left 5/4/2017    Procedure: left breast excisional  biopsy using a lacrimal duct probe and the intraoperative ultrasound. ;  Surgeon: Mallory Lieberman MD;  [place] [facility] [place] [facility] [place]  Service:     HYSTERECTOMY       Family History   Problem Relation Age of Onset    Hypertension Mother     Pancreatic cancer Mother     Hypertension Father     Deep vein thrombosis Father     Hyperlipidemia Father     Pancreatic cancer Sister     Arthritis Sister     Liver cancer Sister     Rectal cancer Sister     Colon cancer Sister     Pancreatic cancer Brother     Liver cancer Brother     Prostate cancer Brother      Social History     Tobacco Use    Smoking status: Former     Packs/day: 0.50     Years: 25.00     Additional pack years: 0.00     Total pack years: 12.50     Types: Cigarettes     Start date:      Quit date: 2021     Years since quittin.5    Smokeless tobacco: Never   Vaping Use    Vaping Use: Never used   Substance Use Topics    Alcohol use: Yes     Comment: occasionally     Drug use: No     Medications Prior to Admission   Medication Sig Dispense Refill Last Dose    acetaminophen (TYLENOL) 500 MG tablet Take 2 tablets by mouth Every 6 (Six) Hours As Needed for Mild Pain. Indications: Pain       amLODIPine (NORVASC) 5 MG tablet Take 1 tablet by mouth Daily. 30 tablet 5     aspirin 81 MG tablet Take 1 tablet by mouth Daily. Indications: Disease involving Lipid Deposits in the Arteries       bisacodyl (DULCOLAX) 5 MG EC tablet Take 1 tablet by mouth Daily As Needed for Constipation. Indications: Constipation       carvedilol (COREG) 25 MG tablet Take 1 tablet by mouth 2 (Two) Times a Day With Meals. Indications: High Blood Pressure Disorder 180 tablet 3     DULoxetine (CYMBALTA) 60 MG capsule Take 60mg + 30mg cap 1 each together PO daily 90 capsule 1     lisinopril (PRINIVIL,ZESTRIL) 40 MG tablet Take 1 tablet by mouth Daily. Indications: High Blood Pressure Disorder 90 tablet 3     Multiple Vitamins-Minerals (PRESERVISION AREDS PO) Take 1 tablet by mouth  Daily. Indications: Vitamin Deficiency       rosuvastatin (Crestor) 20 MG tablet Take 1 tablet by mouth Every Night. Indications: High Amount of Fats in the Blood 90 tablet 3     cholecalciferol (VITAMIN D3) 1000 UNITS tablet Take 1 tablet by mouth Daily. Indications: Vitamin D Deficiency       DULoxetine (CYMBALTA) 30 MG capsule Take 30mg + 60mg 1 po cap daily (Patient not taking: Reported on 12/20/2023) 90 capsule 1      Allergies:  No Known Allergies    Objective    Objective     Vital Signs  Temp:  [97.8 °F (36.6 °C)-98.4 °F (36.9 °C)] 97.8 °F (36.6 °C)  Heart Rate:  [61-92] 67  Resp:  [16-24] 16  BP: (141-188)/(84-97) 177/84  SpO2:  [93 %-99 %] 98 %  on  Flow (L/min):  [4] 4;   Device (Oxygen Therapy): room air  Body mass index is 24.89 kg/m².    Physical Exam  Vitals and nursing note reviewed.   Constitutional:       Appearance: She is ill-appearing. She is not toxic-appearing.   HENT:      Head: Normocephalic and atraumatic.      Right Ear: External ear normal.      Left Ear: External ear normal.      Nose: Nose normal.   Cardiovascular:      Rate and Rhythm: Normal rate and regular rhythm.      Pulses: Normal pulses.   Pulmonary:      Effort: Pulmonary effort is normal. No respiratory distress.      Breath sounds: Normal breath sounds.   Abdominal:      General: Bowel sounds are normal. There is no distension.      Palpations: Abdomen is soft.      Tenderness: There is no abdominal tenderness.   Musculoskeletal:         General: Tenderness (left knee as well as posterior scalp and left side of neck) present. No swelling. Normal range of motion.      Cervical back: Normal range of motion. Tenderness present.   Skin:     General: Skin is warm and dry.   Neurological:      General: No focal deficit present.      Mental Status: She is alert and oriented to person, place, and time.      Sensory: No sensory deficit.      Motor: Weakness present.      Coordination: Coordination normal.   Psychiatric:         Mood  and Affect: Mood normal.         Behavior: Behavior normal.       Results Review:  I reviewed the patient's new clinical results.  I reviewed the patient's new imaging results and agree with the interpretation.  I reviewed the patient's other test results and agree with the interpretation  I personally viewed and interpreted the patient's EKG/Telemetry data    Lab Results (last 24 hours)       Procedure Component Value Units Date/Time    CBC & Differential [[id number]]  (Abnormal) Collected: 01/12/24 0117    Specimen: Blood Updated: 01/12/24 0126    Narrative:      The following orders were created for panel order CBC & Differential.  Procedure                               Abnormality         Status                     ---------                               -----------         ------                     CBC Auto Differential[[id number]]        Abnormal            Final result                 Please view results for these tests on the individual orders.    Comprehensive Metabolic Panel [[id number]]  (Abnormal) Collected: 01/12/24 0117    Specimen: Blood Updated: 01/12/24 0149     Glucose 107 mg/dL      BUN 20 mg/dL      Creatinine 0.79 mg/dL      Sodium 143 mmol/L      Potassium 4.1 mmol/L      Chloride 108 mmol/L      CO2 24.8 mmol/L      Calcium 8.7 mg/dL      Total Protein 6.0 g/dL      Albumin 3.6 g/dL      ALT (SGPT) 10 U/L      AST (SGOT) 15 U/L      Alkaline Phosphatase 73 U/L      Total Bilirubin 0.4 mg/dL      Globulin 2.4 gm/dL      A/G Ratio 1.5 g/dL      BUN/Creatinine Ratio 25.3     Anion Gap 10.2 mmol/L      eGFR 76.7 mL/min/1.73     Narrative:      GFR Normal >60  Chronic Kidney Disease <60  Kidney Failure <15    The GFR formula is only valid for adults with stable renal function between ages 18 and 70.    Magnesium [[id number]]  (Normal) Collected: 01/12/24 0117    Specimen: Blood Updated: 01/12/24 0149     Magnesium 1.9 mg/dL     Single High Sensitivity Troponin T [[id number]]  (Abnormal) Collected:  01/12/24 0117    Specimen: Blood Updated: 01/12/24 0149     HS Troponin T 19 ng/L     Narrative:      High Sensitive Troponin T Reference Range:  <14.0 ng/L- Negative Female for AMI  <22.0 ng/L- Negative Male for AMI  >=14 - Abnormal Female indicating possible myocardial injury.  >=22 - Abnormal Male indicating possible myocardial injury.   Clinicians would have to utilize clinical acumen, EKG, Troponin, and serial changes to determine if it is an Acute Myocardial Infarction or myocardial injury due to an underlying chronic condition.         CBC Auto Differential [[id number]]  (Abnormal) Collected: 01/12/24 0117    Specimen: Blood Updated: 01/12/24 0126     WBC 7.91 10*3/mm3      RBC 4.51 10*6/mm3      Hemoglobin 13.7 g/dL      Hematocrit 41.7 %      MCV 92.5 fL      MCH 30.4 pg      MCHC 32.9 g/dL      RDW 12.1 %      RDW-SD 41.1 fl      MPV 9.0 fL      Platelets 163 10*3/mm3      Neutrophil % 65.6 %      Lymphocyte % 23.0 %      Monocyte % 9.2 %      Eosinophil % 1.0 %      Basophil % 0.8 %      Immature Grans % 0.4 %      Neutrophils, Absolute 5.19 10*3/mm3      Lymphocytes, Absolute 1.82 10*3/mm3      Monocytes, Absolute 0.73 10*3/mm3      Eosinophils, Absolute 0.08 10*3/mm3      Basophils, Absolute 0.06 10*3/mm3      Immature Grans, Absolute 0.03 10*3/mm3      nRBC 0.0 /100 WBC     Urinalysis With Microscopic If Indicated (No Culture) - Urine, Clean Catch [[id number]]  (Abnormal) Collected: 01/12/24 0405    Specimen: Urine, Clean Catch Updated: 01/12/24 0423     Color, UA Yellow     Appearance, UA Clear     pH, UA 7.5     Specific Gravity, UA 1.010     Glucose, UA Negative     Ketones, UA Negative     Bilirubin, UA Negative     Blood, UA Negative     Protein, UA Negative     Leuk Esterase, UA Moderate (2+)     Nitrite, UA Negative     Urobilinogen, UA 0.2 E.U./dL    Urinalysis, Microscopic Only - Urine, Clean Catch [[id number]]  (Abnormal) Collected: 01/12/24 0405    Specimen: Urine, Clean Catch Updated: 01/12/24  0423     RBC, UA 0-2 /HPF      WBC, UA 11-20 /HPF      Bacteria, UA None Seen /HPF      Squamous Epithelial Cells, UA 0-2 /HPF      Hyaline Casts, UA 0-2 /LPF      Methodology Automated Microscopy    Basic Metabolic Panel [[id number]]  (Abnormal) Collected: 01/12/24 0605    Specimen: Blood Updated: 01/12/24 0633     Glucose 100 mg/dL      BUN 16 mg/dL      Creatinine 0.71 mg/dL      Sodium 144 mmol/L      Potassium 4.3 mmol/L      Comment: Slight hemolysis detected by analyzer. Result may be falsely elevated.        Chloride 113 mmol/L      CO2 23.5 mmol/L      Calcium 8.9 mg/dL      BUN/Creatinine Ratio 22.5     Anion Gap 7.5 mmol/L      eGFR 87.2 mL/min/1.73     Narrative:      GFR Normal >60  Chronic Kidney Disease <60  Kidney Failure <15    The GFR formula is only valid for adults with stable renal function between ages 18 and 70.    CBC Auto Differential [[id number]]  (Abnormal) Collected: 01/12/24 0605    Specimen: Blood Updated: 01/12/24 0615     WBC 7.31 10*3/mm3      RBC 4.21 10*6/mm3      Hemoglobin 12.9 g/dL      Hematocrit 39.2 %      MCV 93.1 fL      MCH 30.6 pg      MCHC 32.9 g/dL      RDW 12.1 %      RDW-SD 41.3 fl      MPV 9.2 fL      Platelets 151 10*3/mm3      Neutrophil % 66.9 %      Lymphocyte % 23.9 %      Monocyte % 8.1 %      Eosinophil % 0.3 %      Basophil % 0.5 %      Immature Grans % 0.3 %      Neutrophils, Absolute 4.89 10*3/mm3      Lymphocytes, Absolute 1.75 10*3/mm3      Monocytes, Absolute 0.59 10*3/mm3      Eosinophils, Absolute 0.02 10*3/mm3      Basophils, Absolute 0.04 10*3/mm3      Immature Grans, Absolute 0.02 10*3/mm3      nRBC 0.0 /100 WBC     Protime-INR [[id number]]  (Normal) Collected: 01/12/24 0605    Specimen: Blood Updated: 01/12/24 0628     Protime 13.6 Seconds      INR 1.03    Lipid Panel [[id number]]  (Abnormal) Collected: 01/12/24 0605    Specimen: Blood Updated: 01/12/24 1113     Total Cholesterol 133 mg/dL      Triglycerides 58 mg/dL      HDL Cholesterol 39 mg/dL       LDL Cholesterol  82 mg/dL      VLDL Cholesterol 12 mg/dL      LDL/HDL Ratio 2.11    Narrative:      Cholesterol Reference Ranges  (U.S. Department of Health and Human Services ATP III Classifications)    Desirable          <200 mg/dL  Borderline High    200-239 mg/dL  High Risk          >240 mg/dL      Triglyceride Reference Ranges  (U.S. Department of Health and Human Services ATP III Classifications)    Normal           <150 mg/dL  Borderline High  150-199 mg/dL  High             200-499 mg/dL  Very High        >500 mg/dL    HDL Reference Ranges  (U.S. Department of Health and Human Services ATP III Classifications)    Low     <40 mg/dl (major risk factor for CHD)  High    >60 mg/dl ('negative' risk factor for CHD)        LDL Reference Ranges  (U.S. Department of Health and Human Services ATP III Classifications)    Optimal          <100 mg/dL  Near Optimal     100-129 mg/dL  Borderline High  130-159 mg/dL  High             160-189 mg/dL  Very High        >189 mg/dL    High Sensitivity Troponin T [[id number]]  (Abnormal) Collected: 01/12/24 1155    Specimen: Blood Updated: 01/12/24 1239     HS Troponin T 20 ng/L     Narrative:      High Sensitive Troponin T Reference Range:  <14.0 ng/L- Negative Female for AMI  <22.0 ng/L- Negative Male for AMI  >=14 - Abnormal Female indicating possible myocardial injury.  >=22 - Abnormal Male indicating possible myocardial injury.   Clinicians would have to utilize clinical acumen, EKG, Troponin, and serial changes to determine if it is an Acute Myocardial Infarction or myocardial injury due to an underlying chronic condition.                 Imaging Results (Last 24 Hours)       Procedure Component Value Units Date/Time    XR Knee 1 or 2 View Left [[id number]] Resulted: 01/12/24 1251     Updated: 01/12/24 1251    CT Head Without Contrast [[id number]] Collected: 01/12/24 2507     Updated: 01/12/24 4163    Narrative:      CT OF THE HEAD WITHOUT CONTRAST     HISTORY: Syncope and  vomiting     COMPARISON: None available.     TECHNIQUE: Axial CT imaging was obtained through the brain. No IV  contrast was administered.     FINDINGS:  No acute intracranial hemorrhage is seen. There is diffuse atrophy.  There is periventricular and deep white matter microangiopathic change.  There is no midline shift or mass effect. 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 Additional old infarct is suspected within the anterior limb of  the right internal capsule. No calvarial fracture is seen. Paranasal  sinuses and mastoid air cells appear clear. There is a left parietal  scalp hematoma.       Impression:      No acute intracranial findings.     Radiation dose reduction techniques were utilized, including automated  exposure control and exposure modulation based on body size.        This report was finalized on 1/12/2024 4:20 AM by Dr. Sydni Adames M.D on Workstation: [id number]       XR Chest 1 View [[id number]] Collected: 01/12/24 0345     Updated: 01/12/24 0350    Narrative:      SINGLE VIEW OF THE CHEST     HISTORY: Chest heaviness     COMPARISON: February 5, 2023     FINDINGS:  There is cardiomegaly. There is tortuosity and ectasia of the thoracic  aorta. There is thoracolumbar scoliosis. No pneumothorax or pleural  effusion is seen. Nonspecific retrocardiac density is stable. It may  represent a hiatal hernia. No acute infiltrates are seen.       Impression:      No acute findings.     This report was finalized on 1/12/2024 3:47 AM by Dr. Sydni Adames M.D on Workstation: [id number]               Results for orders placed during the hospital encounter of 01/12/24    Adult Transthoracic Echo Complete W/ Cont if Necessary Per Protocol    Interpretation Summary    Left ventricular systolic function is normal. Calculated left ventricular EF = 62.2%    Abnormal septal balance noted in the setting of bundle branch block.    RV normal in size and function.  Borderline RVH.    Estimated right  ventricular systolic pressure from tricuspid regurgitation is normal (<35 mmHg).    No significant valvular abnormalities.    Normal biatrial and IVC size.    No prior study available for comparison.      ECG 12 Lead Syncope   Preliminary Result   HEART RATE= 71  bpm   RR Interval= 845  ms   SC Interval= 200  ms   P Horizontal Axis= -15  deg   P Front Axis= 76  deg   QRSD Interval= 99  ms   QT Interval= 422  ms   QTcB= 459  ms   QRS Axis= -57  deg   T Wave Axis= 65  deg   - ABNORMAL ECG -   Sinus rhythm   Left atrial enlargement   Left anterior fascicular block   Abnormal R-wave progression, early transition   Electronically Signed By:    Date and Time of Study: 2024-01-12 00:53:00      SCANNED - TELEMETRY     Final Result      SCANNED - TELEMETRY     Final Result           Assessment/Plan     Active Hospital Problems    Diagnosis  POA    **Syncope and collapse [R55]  Yes    Atrial fibrillation with RVR [I48.91]  Yes    Chest pain [R07.9]  Yes    Left knee pain [M25.562]  Yes    Neck pain on left side [M54.2]  Yes    Dysautonomia [G90.1]  Yes    Atherosclerosis of both carotid arteries [I65.23]  Yes    Essential hypertension [I10]  Yes    HLD (hyperlipidemia) [E78.5]  Yes     Ms. Avelar is a 78 y.o. female who presented to the hospital after a syncopal episode at home.  She apparently was having a lot of fatigue and woke up with left-sided chest pressure associated with diaphoresis, dizziness and weakness.symptoms did not improve with oral intake and she suffered a syncopal episode while in her kitchen.  She was found to have atrial fibrillation with RVR when EMS arrived that resolved on arrival to the hospital.    Syncope and collapse  Chest pain  Atrial fibrillation with RVR (en route)  -Cardiology has been consulted.  Plans for echocardiogram and possible stress testing pending the results of that.  -Currently back in normal sinus rhythm.  Elevated [id number] score so cardiology is starting Lovenox.  -Continue  gentle hydration.   -Last carotid Doppler 2/2023 negative for any significant stenosis, but continues to smoke.  Will repeat Doppler for completeness sake.  -Monitor telemetry.    Left knee pain  Left-sided neck pain  -CT head negative for any acute findings.  -Did have loss of consciousness with head injury-obtain cervical spine imaging.  -Check x-ray of left knee.  -PT/OT to see.  -Continue Tylenol.  Will add low-dose Norco if ineffective.    Hypertension  -Resume home regimen and monitor trends.    Hyperlipidemia  -Statin.     I discussed the patients findings and my recommendations with patient and nursing staff.    VTE Prophylaxis - Pharmacy to dose Lovenox.  Code Status - No CPR/intubation. Discussed and confirmed with patient at bedside.       DARLENE Calderon  [facility]  01/12/24  12:53 EST

## 2024-01-12 NOTE — ED PROVIDER NOTES
EMERGENCY DEPARTMENT ENCOUNTER    Room Number:  13/13  PCP: Mark Hernández DO  Historian: Patient      HPI:  Chief Complaint: Syncope  A complete HPI/ROS/PMH/PSH/SH/FH are unobtainable due to: None  Context: Gita Avelar is a 78 y.o. female who presents to the ED c/o sudden onset of a syncopal episode that occurred just prior to ED arrival today.  The patient reports that prior to the onset of the syncopal episode, she was experiencing some chest pain described as a pressure sensation to the central/anterior portion of her chest.  She also reports some shortness of breath as well as associated nausea.  EMS states that upon their arrival, she did appear to be in atrial fibrillation with RVR.  The patient denies previous history of that.  Currently, she does report that her chest pain has improved but is still experiencing some mild shortness of breath.  She did strike her head and does complain of a mild headache.  She denies neck pain, back pain, vomiting, or abdominal pain.            PAST MEDICAL HISTORY  Active Ambulatory Problems     Diagnosis Date Noted    Avitaminosis D 07/11/2016    B12 deficiency 07/11/2016    Near syncope 07/11/2016    Atrophic vaginitis 07/11/2016    Awareness of heartbeats 07/11/2016    OP (osteoporosis) 07/11/2016    HLD (hyperlipidemia) 07/11/2016    Fatigue 07/11/2016    Essential hypertension 07/11/2016    Moderate episode of recurrent major depressive disorder 11/29/2016    Nipple discharge 04/13/2017    Abnormal ultrasound of breast 04/13/2017    Abnormal mammogram 04/13/2017    Lump or mass in breast 04/13/2017    Family history of malignant neoplasm of pancreas 04/13/2017    Atherosclerosis of both carotid arteries 01/09/2019    Acute UTI 01/29/2023    COVID-19 virus infection 01/29/2023    Hypoxia 01/29/2023    Streptococcal septicemia 01/31/2023    Generalized weakness 02/05/2023    Dizziness on standing 02/06/2023    History of Guillain-[place] syndrome     History of  subdural hematoma     Dysautonomia 2023    Cytokine release syndrome, grade 1 2023     Resolved Ambulatory Problems     Diagnosis Date Noted    Infection of urinary tract 2016    Difficult or painful urination 2016    Blues 2016    AV (anaerobic vaginosis) 2016    Abnormal weight gain 2016    Epigastric pain 2016    Major depressive disorder 2018     Past Medical History:   Diagnosis Date    Arthritis     Bladder prolapse, female, acquired     Depression     Diverticulitis of colon     Hyperlipidemia     Macular degeneration of left eye     Osteoporosis     Stress incontinence     Vitamin D deficiency          PAST SURGICAL HISTORY  Past Surgical History:   Procedure Laterality Date    BREAST BIOPSY      BREAST BIOPSY Left 2017    Procedure: left breast excisional biopsy using a lacrimal duct probe and the intraoperative ultrasound. ;  Surgeon: Mallory Lieberman MD;  [place] [facility] [place] [facility] [place]  Service:     HYSTERECTOMY           FAMILY HISTORY  Family History   Problem Relation Age of Onset    Hypertension Mother     Pancreatic cancer Mother     Hypertension Father     Deep vein thrombosis Father     Hyperlipidemia Father     Pancreatic cancer Sister     Arthritis Sister     Liver cancer Sister     Rectal cancer Sister     Colon cancer Sister     Pancreatic cancer Brother     Liver cancer Brother     Prostate cancer Brother          SOCIAL HISTORY  Social History     Socioeconomic History    Marital status:    Tobacco Use    Smoking status: Former     Packs/day: 0.50     Years: 25.00     Additional pack years: 0.00     Total pack years: 12.50     Types: Cigarettes     Start date:      Quit date: 2021     Years since quittin.5    Smokeless tobacco: Never   Vaping Use    Vaping Use: Never used   Substance and Sexual Activity    Alcohol use: Yes     Comment: occasionally     Drug use: No    Sexual activity: Not Currently     Partners: Male          ALLERGIES  Patient has no known allergies.        REVIEW OF SYSTEMS  Review of Systems   Constitutional:  Negative for fever.   HENT:  Negative for sore throat.    Eyes: Negative.    Respiratory:  Positive for shortness of breath. Negative for cough.    Cardiovascular:  Positive for chest pain.   Gastrointestinal:  Positive for nausea. Negative for abdominal pain, diarrhea and vomiting.   Genitourinary:  Negative for dysuria.   Musculoskeletal:  Negative for neck pain.   Skin:  Negative for rash.   Allergic/Immunologic: Negative.    Neurological:  Positive for syncope and headaches. Negative for weakness and numbness.   Hematological: Negative.    Psychiatric/Behavioral: Negative.     All other systems reviewed and are negative.         PHYSICAL EXAM  ED Triage Vitals [01/12/24 0041]   Temp Heart Rate Resp BP SpO2   98.4 °F (36.9 °C) 92 24 141/91 97 %      Temp src Heart Rate Source Patient Position BP Location FiO2 (%)   Tympanic -- -- -- --       Physical Exam  Constitutional:       General: She is not in acute distress.     Appearance: Normal appearance. She is not ill-appearing or toxic-appearing.   HENT:      Head: Normocephalic and atraumatic.   Eyes:      Extraocular Movements: Extraocular movements intact.      Pupils: Pupils are equal, round, and reactive to light.   Cardiovascular:      Rate and Rhythm: Normal rate and regular rhythm.      Heart sounds: No murmur heard.     No friction rub. No gallop.   Pulmonary:      Effort: Pulmonary effort is normal.      Breath sounds: Normal breath sounds.   Abdominal:      General: Abdomen is flat. There is no distension.      Palpations: Abdomen is soft.      Tenderness: There is no abdominal tenderness.   Musculoskeletal:         General: No swelling or tenderness. Normal range of motion.      Cervical back: Normal range of motion and neck supple.   Skin:     General: Skin is warm and dry.   Neurological:      General: No focal deficit present.      Mental  Status: She is alert and oriented to person, place, and time.      Sensory: No sensory deficit.      Motor: No weakness.   Psychiatric:         Mood and Affect: Mood normal.         Behavior: Behavior normal.           Vital signs and nursing notes reviewed.          LAB RESULTS  Recent Results (from the past 24 hour(s))   ECG 12 Lead Syncope    Collection Time: 01/12/24 12:53 AM   Result Value Ref Range    QT Interval 422 ms    QTC Interval 459 ms   Light Blue Top    Collection Time: 01/12/24  1:16 AM   Result Value Ref Range    Extra Tube Hold for add-ons.    Comprehensive Metabolic Panel    Collection Time: 01/12/24  1:17 AM    Specimen: Blood   Result Value Ref Range    Glucose 107 (H) 65 - 99 mg/dL    BUN 20 8 - 23 mg/dL    Creatinine 0.79 0.57 - 1.00 mg/dL    Sodium 143 136 - 145 mmol/L    Potassium 4.1 3.5 - 5.2 mmol/L    Chloride 108 (H) 98 - 107 mmol/L    CO2 24.8 22.0 - 29.0 mmol/L    Calcium 8.7 8.6 - 10.5 mg/dL    Total Protein 6.0 6.0 - 8.5 g/dL    Albumin 3.6 3.5 - 5.2 g/dL    ALT (SGPT) 10 1 - 33 U/L    AST (SGOT) 15 1 - 32 U/L    Alkaline Phosphatase 73 39 - 117 U/L    Total Bilirubin 0.4 0.0 - 1.2 mg/dL    Globulin 2.4 gm/dL    A/G Ratio 1.5 g/dL    BUN/Creatinine Ratio 25.3 (H) 7.0 - 25.0    Anion Gap 10.2 5.0 - 15.0 mmol/L    eGFR 76.7 >60.0 mL/min/1.73   Magnesium    Collection Time: 01/12/24  1:17 AM    Specimen: Blood   Result Value Ref Range    Magnesium 1.9 1.6 - 2.4 mg/dL   Single High Sensitivity Troponin T    Collection Time: 01/12/24  1:17 AM    Specimen: Blood   Result Value Ref Range    HS Troponin T 19 (H) <14 ng/L   Green Top (Gel)    Collection Time: 01/12/24  1:17 AM   Result Value Ref Range    Extra Tube Hold for add-ons.    Lavender Top    Collection Time: 01/12/24  1:17 AM   Result Value Ref Range    Extra Tube hold for add-on    Gold Top - SST    Collection Time: 01/12/24  1:17 AM   Result Value Ref Range    Extra Tube Hold for add-ons.    CBC Auto Differential    Collection  Time: 01/12/24  1:17 AM    Specimen: Blood   Result Value Ref Range    WBC 7.91 3.40 - 10.80 10*3/mm3    RBC 4.51 3.77 - 5.28 10*6/mm3    Hemoglobin 13.7 12.0 - 15.9 g/dL    Hematocrit 41.7 34.0 - 46.6 %    MCV 92.5 79.0 - 97.0 fL    MCH 30.4 26.6 - 33.0 pg    MCHC 32.9 31.5 - 35.7 g/dL    RDW 12.1 (L) 12.3 - 15.4 %    RDW-SD 41.1 37.0 - 54.0 fl    MPV 9.0 6.0 - 12.0 fL    Platelets 163 140 - 450 10*3/mm3    Neutrophil % 65.6 42.7 - 76.0 %    Lymphocyte % 23.0 19.6 - 45.3 %    Monocyte % 9.2 5.0 - 12.0 %    Eosinophil % 1.0 0.3 - 6.2 %    Basophil % 0.8 0.0 - 1.5 %    Immature Grans % 0.4 0.0 - 0.5 %    Neutrophils, Absolute 5.19 1.70 - 7.00 10*3/mm3    Lymphocytes, Absolute 1.82 0.70 - 3.10 10*3/mm3    Monocytes, Absolute 0.73 0.10 - 0.90 10*3/mm3    Eosinophils, Absolute 0.08 0.00 - 0.40 10*3/mm3    Basophils, Absolute 0.06 0.00 - 0.20 10*3/mm3    Immature Grans, Absolute 0.03 0.00 - 0.05 10*3/mm3    nRBC 0.0 0.0 - 0.2 /100 WBC   Urinalysis With Microscopic If Indicated (No Culture) - Urine, Clean Catch    Collection Time: 01/12/24  4:05 AM    Specimen: Urine, Clean Catch   Result Value Ref Range    Color, UA Yellow Yellow, Straw    Appearance, UA Clear Clear    pH, UA 7.5 5.0 - 8.0    Specific Gravity, UA 1.010 1.005 - 1.030    Glucose, UA Negative Negative    Ketones, UA Negative Negative    Bilirubin, UA Negative Negative    Blood, UA Negative Negative    Protein, UA Negative Negative    Leuk Esterase, UA Moderate (2+) (A) Negative    Nitrite, UA Negative Negative    Urobilinogen, UA 0.2 E.U./dL 0.2 - 1.0 E.U./dL   Urinalysis, Microscopic Only - Urine, Clean Catch    Collection Time: 01/12/24  4:05 AM    Specimen: Urine, Clean Catch   Result Value Ref Range    RBC, UA 0-2 None Seen, 0-2 /HPF    WBC, UA 11-20 (A) None Seen, 0-2 /HPF    Bacteria, UA None Seen None Seen /HPF    Squamous Epithelial Cells, UA 0-2 None Seen, 0-2 /HPF    Hyaline Casts, UA 0-2 None Seen /LPF    Methodology Automated Microscopy         Ordered the above labs and reviewed the results.        RADIOLOGY  CT Head Without Contrast    Result Date: 1/12/2024  CT OF THE HEAD WITHOUT CONTRAST  HISTORY: Syncope and vomiting  COMPARISON: None available.  TECHNIQUE: Axial CT imaging was obtained through the brain. No IV contrast was administered.  FINDINGS: No acute intracranial hemorrhage is seen. There is diffuse atrophy. There is periventricular and deep white matter microangiopathic change. There is no midline shift or mass effect. 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 Additional old infarct is suspected within the anterior limb of the right internal capsule. No calvarial fracture is seen. Paranasal sinuses and mastoid air cells appear clear. There is a left parietal scalp hematoma.      No acute intracranial findings.  Radiation dose reduction techniques were utilized, including automated exposure control and exposure modulation based on body size.   This report was finalized on 1/12/2024 4:20 AM by Dr. Sydni Adames M.D on Workstation: [id number]      XR Chest 1 View    Result Date: 1/12/2024  SINGLE VIEW OF THE CHEST  HISTORY: Chest heaviness  COMPARISON: February 5, 2023  FINDINGS: There is cardiomegaly. There is tortuosity and ectasia of the thoracic aorta. There is thoracolumbar scoliosis. No pneumothorax or pleural effusion is seen. Nonspecific retrocardiac density is stable. It may represent a hiatal hernia. No acute infiltrates are seen.      No acute findings.  This report was finalized on 1/12/2024 3:47 AM by Dr. Sydni Adames M.D on Workstation: [id number]       Ordered the above noted radiological studies. Reviewed by me in PACS.            PROCEDURES  Procedures    EKG          EKG time: 0053  Rhythm/Rate: NSR, 71  P waves and ME: nml  QRS, axis: nml, LAD   ST and T waves: nml     Interpreted Contemporaneously by me, independently viewed  unchanged compared to prior 2/5/23            MEDICATIONS GIVEN IN ER  Medications    sodium chloride 0.9 % flush 10 mL (has no administration in time range)   sodium chloride 0.9 % bolus 1,000 mL (1,000 mL Intravenous New Bag 1/12/24 9032)                   MEDICAL DECISION MAKING, PROGRESS, and CONSULTS    All labs have been independently reviewed by me.  All radiology studies have been reviewed by me and I have also reviewed the radiology report.   EKG's independently viewed and interpreted by me.  Discussion below represents my analysis of pertinent findings related to patient's condition, differential diagnosis, treatment plan and final disposition.      Additional sources:  - Discussed/ obtained information from independent historians: History obtained from the patient herself at bedside.    - External (non-ED) record review: Upon medical records review, the patient was last seen and evaluated in the outpatient office of family medicine on 12/20/2023 in follow-up for her known hypertension as well as urinary tract infection.    - Chronic or social conditions impacting care: None reported    - Shared decision making: Admission decision based on shared conversations have between myself, the patient at bedside, as well as DARLENE Cruz for [facility].      Orders placed during this visit:  Orders Placed This Encounter   Procedures    CT Head Without Contrast    XR Chest 1 View    Flushing Draw    Comprehensive Metabolic Panel    Magnesium    Single High Sensitivity Troponin T    CBC Auto Differential    Urinalysis With Microscopic If Indicated (No Culture) - Urine, Clean Catch    Urinalysis, Microscopic Only - Urine, Clean Catch    NPO Diet NPO Type: Strict NPO    Undress & Gown    Continuous Pulse Oximetry    Vital Signs    Orthostatic Blood Pressure    [facility] (on-call MD unless specified) Details    Oxygen Therapy- Nasal Cannula; Titrate 1-6 LPM Per SpO2; 90 - 95%    POC Glucose Once    ECG 12 Lead Syncope    Insert Peripheral IV    CBC & Differential    Green Top (Gel)    Lavender Top    Gold  Top - SST    Light Blue Top             Differential diagnosis includes but is not limited to:    Differential diagnosis includes but is not limited to vasovagal syncope, cardiogenic syncope, long QT syndrome, acute coronary syndrome, acute anemia, or severe electrolyte disturbance.      Independent interpretation of labs, radiology studies, and discussions with consultants:    Chest x-ray was independently interpreted by myself with my interpretation showing mild cardiomegaly without area of edema, infiltrate, or pneumothorax.        ED Course as of 01/12/24 0526   Fri Jan 12, 2024   0523 On reevaluation, the patient's vital signs are stable however she is currently complaining of some generalized aches likely secondary to her syncopal episode.  I informed her that her lab work thus far is unremarkable and head CT unremarkable.  We will admit her to the hospital for monitoring and further evaluation and treatment.  She is in agreement with that plan and all questions have been answered. [BM]   0524 The patient's presentation, workup, as well as diagnosis and treatment plan was discussed at length with DARLENE Cruz for [facility].  She agrees to admit the patient to the hospital today for Dr. Williamson. [BM]      ED Course User Index  [BM] John Hawkins MD             DIAGNOSIS  Final diagnoses:   Syncope and collapse   Chest pain, unspecified type   Shortness of breath   Elevated troponin         DISPOSITION  ADMISSION    Discussed treatment plan and reason for admission with pt/family and admitting physician.  Pt/family voiced understanding of the plan for admission for further testing/treatment as needed.               Latest Documented Vital Signs:  As of 05:26 EST  BP- 171/94 HR- 61 Temp- 98.4 °F (36.9 °C) (Tympanic) O2 sat- 97%              --    Please note that portions of this were completed with a voice recognition program.       Note Disclaimer: At [facility], we believe that sharing  information builds trust and better relationships. You are receiving this note because you are receiving care at [facility] or recently visited. It is possible you will see health information before a provider has talked with you about it. This kind of information can be easy to misunderstand. To help you fully understand what it means for your health, we urge you to discuss this note with your provider.             John Hawkins MD  01/12/24 0522

## 2024-01-12 NOTE — CONSULTS
Date of Hospital Visit: 2024  Date of consult: 24  Encounter Provider: Austyn Roberts MD  Place of Service: [facility] CARDIOLOGY  Patient Name: Gita Avelar  :1945  Referral Provider: John Hawkins MD    Chief complaint:syncope     Reason for consult: syncope     History of Present Illness   Gita Avelar is a 78 year old pt with a history of  depression, HLD, HTN, COVID 19, arthritis,Guillain [place], and SDH.     Pt presented to ER on 24 with complaints of a syncopal episode. Prior to the episode of syncope she was experiencing  some chest pain/pressure in anterior portion of her chest. She also reported some shortness of breath and nausea. Per EMS, she appeared to be in Afib with RVR. Pt reported striking her head and now has a mild headache. She denied any neck pain, back pain, vomiting or abd pain. In ER, troponin T 19, UA negative for nitrates and bacteria, CT of head showed nothing acute, CXR showed nothing acute, EKG showed NSR 71,        ECHO 1/8/15      Past Medical History:   Diagnosis Date    Arthritis     HANDS    B12 deficiency     Bladder prolapse, female, acquired     Depression     Diverticulitis of colon     History of Guillain-[place] syndrome     History of subdural hematoma     Hyperlipidemia     Macular degeneration of left eye     Osteoporosis     Stress incontinence     Vitamin D deficiency        Past Surgical History:   Procedure Laterality Date    BREAST BIOPSY      BREAST BIOPSY Left 2017    Procedure: left breast excisional biopsy using a lacrimal duct probe and the intraoperative ultrasound. ;  Surgeon: Mallory Lieberman MD;  [place] [facility] [place] [facility] [place]  Service:     HYSTERECTOMY         (Not in a hospital admission)      Current Meds  Scheduled Meds:senna-docusate sodium, 2 tablet, Oral, BID  sodium chloride, 10 mL, Intravenous, Q12H      Continuous Infusions:sodium chloride, 100 mL/hr, Last Rate: 100 mL/hr (24  "0605)      PRN Meds:.  acetaminophen **OR** acetaminophen **OR** acetaminophen    senna-docusate sodium **AND** polyethylene glycol **AND** bisacodyl **AND** bisacodyl    nitroglycerin    ondansetron    sodium chloride    sodium chloride    sodium chloride    Allergies as of 2024    (No Known Allergies)       Social History     Socioeconomic History    Marital status:    Tobacco Use    Smoking status: Former     Packs/day: 0.50     Years: 25.00     Additional pack years: 0.00     Total pack years: 12.50     Types: Cigarettes     Start date:      Quit date: 2021     Years since quittin.5    Smokeless tobacco: Never   Vaping Use    Vaping Use: Never used   Substance and Sexual Activity    Alcohol use: Yes     Comment: occasionally     Drug use: No    Sexual activity: Not Currently     Partners: Male       Family History   Problem Relation Age of Onset    Hypertension Mother     Pancreatic cancer Mother     Hypertension Father     Deep vein thrombosis Father     Hyperlipidemia Father     Pancreatic cancer Sister     Arthritis Sister     Liver cancer Sister     Rectal cancer Sister     Colon cancer Sister     Pancreatic cancer Brother     Liver cancer Brother     Prostate cancer Brother        REVIEW OF SYSTEMS:   All systems reviewed and pertinent positives include in HPI otherwise negative review of systems.       Objective:   Temp:  [98.4 °F (36.9 °C)] 98.4 °F (36.9 °C)  Heart Rate:  [61-92] 61  Resp:  [16-24] 16  BP: (141-176)/(85-97) 162/85  Body mass index is 24.89 kg/m².  Flowsheet Rows      Flowsheet Row First Filed Value   Admission Height 162.6 cm (64\") Documented at 2024   Admission Weight 65.8 kg (145 lb) Documented at 2024 004          Vitals:    24 0701   BP: 162/85   Pulse: 61   Resp:    Temp:    SpO2: 93%       General Appearance:    Alert, cooperative, in no acute distress   Head:    Normocephalic, without obvious abnormality, atraumatic   Eyes:          "   Lids and lashes normal, conjunctivae and sclerae normal, no   icterus, no pallor, corneas clear, PERRLA   Ears:    Ears appear intact with no abnormalities noted   Throat:   No oral lesions, no thrush, oral mucosa moist   Neck:   No adenopathy, supple, trachea midline, no thyromegaly, no   carotid bruit, no JVD   Back:     No kyphosis present, no scoliosis present, no skin lesions, erythema or scars, no tenderness to percussion or palpation, range of motion normal   Lungs:     Clear to auscultation,respirations regular, even and unlabored    Heart:    Regular rhythm and normal rate, normal S1 and S2, no murmur, no gallop, no rub, no click   Chest Wall:    No abnormalities observed   Abdomen:     Normal bowel sounds, no masses, no organomegaly, soft nontender, nondistended, no guarding, no rebound  tenderness   Extremities:   Moves all extremities well, no edema, no cyanosis, no redness   Pulses:   Pulses palpable and equal bilaterally. Normal radial, carotid, femoral, dorsalis pedis and posterior tibial pulses bilaterally. Normal abdominal aorta   Skin:  Neurology:   Psychiatric:   No bleeding, bruising or rash   Normal speech and cranial nerve exam, no focal deficit   Alert and oriented x 3, normal mood and affect             Review of Data:      Results from last 7 days   Lab Units 01/12/24  0605 01/12/24  0117   SODIUM mmol/L 144 143   POTASSIUM mmol/L 4.3 4.1   CHLORIDE mmol/L 113* 108*   CO2 mmol/L 23.5 24.8   BUN mg/dL 16 20   CREATININE mg/dL 0.71 0.79   CALCIUM mg/dL 8.9 8.7   BILIRUBIN mg/dL  --  0.4   ALK PHOS U/L  --  73   ALT (SGPT) U/L  --  10   AST (SGOT) U/L  --  15   GLUCOSE mg/dL 100* 107*     Results from last 7 days   Lab Units 01/12/24  0117   HSTROP T ng/L 19*     @LABRCNTbnp@  Results from last 7 days   Lab Units 01/12/24  0605 01/12/24  0117   WBC 10*3/mm3 7.31 7.91   HEMOGLOBIN g/dL 12.9 13.7   HEMATOCRIT % 39.2 41.7   PLATELETS 10*3/mm3 151 163     Results from last 7 days   Lab Units  01/12/24  0605   INR  1.03     Results from last 7 days   Lab Units 01/12/24  0117   MAGNESIUM mg/dL 1.9     @LABRCNTIP(chol,trig,hdl,ldl)                        I personally viewed and interpreted the patient's EKG/Telemetry data  )   Syncope and collapse        Assessment and Plan:    Ms. Avelar has syncopal episode with fall yesterday morning while she was walking to her kitchen.  She felt weak and lightheaded before passing out.  She hit his head but without significant injuries.  She eventually crawled on the floor and got in phone and called her son.  She reports palpitations.  She was noted to be in A-fib with RVR per rhythm strip done by EMS.  She converted back to sinus on arrival to the ER.  She started having chest heaviness after she woke up that eventually resolved with aspirin and nitroglycerin.  No significant recurrence of chest pain overnight.  At baseline she ambulates using a cane because of prior right femur surgery.  She denies rest or exertional chest discomfort in the past.  She has been feeling fairly unwell the past couple of days.  She reports chronic intermittent palpitations at home    Syncope-likely from hypotension upon standing walking likely aggravated by A-fib with RVR. No orthostatic hypotension   Borderline elevated troponin/chest discomfort-A-fib with RVR may be contributing  Paroxysmal atrial fibrillation-new diagnosis. [id number] score of 4. CT head wo bleed  Essential hypertension-on amlodipine, carvedilol and lisinopril at home.  Significant hypercholesterolemia with LDL of 216-on crestor     Trend troponin. Get echocardiogram.   Agree with IVF   Optimize BP control   Start statin, lovenox   May do stress test depending on findings of echo    Austyn Roberts MD  01/12/24  08:09 EST.      Time spent in reviewing chart, discussion and examination:                    Simple / Intermediate / Complex Repair - Final Wound Length In Cm: 2.6
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
## 2024-01-13 ENCOUNTER — APPOINTMENT (OUTPATIENT)
Dept: CARDIOLOGY | Facility: HOSPITAL | Age: 79
End: 2024-01-13
Payer: MEDICARE

## 2024-01-13 ENCOUNTER — APPOINTMENT (OUTPATIENT)
Dept: NUCLEAR MEDICINE | Facility: HOSPITAL | Age: 79
End: 2024-01-13
Payer: MEDICARE

## 2024-01-13 LAB
ANION GAP SERPL CALCULATED.3IONS-SCNC: 9 MMOL/L (ref 5–15)
BH CV XLRA MEAS LEFT DIST CCA EDV: 24.2 CM/SEC
BH CV XLRA MEAS LEFT DIST CCA PSV: 66.5 CM/SEC
BH CV XLRA MEAS LEFT DIST ICA EDV: -33.5 CM/SEC
BH CV XLRA MEAS LEFT DIST ICA PSV: -73.3 CM/SEC
BH CV XLRA MEAS LEFT ICA/CCA RATIO: 1.12
BH CV XLRA MEAS LEFT MID ICA EDV: -34.2 CM/SEC
BH CV XLRA MEAS LEFT MID ICA PSV: -74.6 CM/SEC
BH CV XLRA MEAS LEFT PROX CCA EDV: -21.7 CM/SEC
BH CV XLRA MEAS LEFT PROX CCA PSV: -72.7 CM/SEC
BH CV XLRA MEAS LEFT PROX ECA EDV: -11 CM/SEC
BH CV XLRA MEAS LEFT PROX ECA PSV: -134.1 CM/SEC
BH CV XLRA MEAS LEFT PROX ICA EDV: -21.7 CM/SEC
BH CV XLRA MEAS LEFT PROX ICA PSV: -65.2 CM/SEC
BH CV XLRA MEAS LEFT PROX SCLA PSV: -113.5 CM/SEC
BH CV XLRA MEAS LEFT VERTEBRAL A EDV: 14.7 CM/SEC
BH CV XLRA MEAS LEFT VERTEBRAL A PSV: 53.1 CM/SEC
BH CV XLRA MEAS RIGHT DIST CCA EDV: -18 CM/SEC
BH CV XLRA MEAS RIGHT DIST CCA PSV: -70.2 CM/SEC
BH CV XLRA MEAS RIGHT DIST ICA EDV: -18.7 CM/SEC
BH CV XLRA MEAS RIGHT DIST ICA PSV: -50.6 CM/SEC
BH CV XLRA MEAS RIGHT ICA/CCA RATIO: 0.78
BH CV XLRA MEAS RIGHT MID ICA EDV: -23.6 CM/SEC
BH CV XLRA MEAS RIGHT MID ICA PSV: -55 CM/SEC
BH CV XLRA MEAS RIGHT PROX CCA EDV: 16.8 CM/SEC
BH CV XLRA MEAS RIGHT PROX CCA PSV: 62.7 CM/SEC
BH CV XLRA MEAS RIGHT PROX ECA EDV: -11.8 CM/SEC
BH CV XLRA MEAS RIGHT PROX ECA PSV: -110 CM/SEC
BH CV XLRA MEAS RIGHT PROX ICA EDV: -15.7 CM/SEC
BH CV XLRA MEAS RIGHT PROX ICA PSV: -54.1 CM/SEC
BH CV XLRA MEAS RIGHT PROX SCLA PSV: 100.6 CM/SEC
BH CV XLRA MEAS RIGHT VERTEBRAL A EDV: 17.7 CM/SEC
BH CV XLRA MEAS RIGHT VERTEBRAL A PSV: 50.1 CM/SEC
BUN SERPL-MCNC: 11 MG/DL (ref 8–23)
BUN/CREAT SERPL: 16.9 (ref 7–25)
CALCIUM SPEC-SCNC: 8.4 MG/DL (ref 8.6–10.5)
CHLORIDE SERPL-SCNC: 109 MMOL/L (ref 98–107)
CO2 SERPL-SCNC: 22 MMOL/L (ref 22–29)
CREAT SERPL-MCNC: 0.65 MG/DL (ref 0.57–1)
DEPRECATED RDW RBC AUTO: 42.8 FL (ref 37–54)
EGFRCR SERPLBLD CKD-EPI 2021: 89.7 ML/MIN/1.73
ERYTHROCYTE [DISTWIDTH] IN BLOOD BY AUTOMATED COUNT: 12.1 % (ref 12.3–15.4)
GLUCOSE SERPL-MCNC: 81 MG/DL (ref 65–99)
HCT VFR BLD AUTO: 39.4 % (ref 34–46.6)
HGB BLD-MCNC: 12.8 G/DL (ref 12–15.9)
MCH RBC QN AUTO: 31.1 PG (ref 26.6–33)
MCHC RBC AUTO-ENTMCNC: 32.5 G/DL (ref 31.5–35.7)
MCV RBC AUTO: 95.6 FL (ref 79–97)
PLATELET # BLD AUTO: 149 10*3/MM3 (ref 140–450)
PMV BLD AUTO: 9.6 FL (ref 6–12)
POTASSIUM SERPL-SCNC: 3.4 MMOL/L (ref 3.5–5.2)
RBC # BLD AUTO: 4.12 10*6/MM3 (ref 3.77–5.28)
SODIUM SERPL-SCNC: 140 MMOL/L (ref 136–145)
WBC NRBC COR # BLD AUTO: 6.02 10*3/MM3 (ref 3.4–10.8)

## 2024-01-13 PROCEDURE — 80048 BASIC METABOLIC PNL TOTAL CA: CPT | Performed by: NURSE PRACTITIONER

## 2024-01-13 PROCEDURE — 97161 PT EVAL LOW COMPLEX 20 MIN: CPT

## 2024-01-13 PROCEDURE — 99214 OFFICE O/P EST MOD 30 MIN: CPT | Performed by: INTERNAL MEDICINE

## 2024-01-13 PROCEDURE — 97530 THERAPEUTIC ACTIVITIES: CPT

## 2024-01-13 PROCEDURE — 25010000002 ENOXAPARIN PER 10 MG: Performed by: INTERNAL MEDICINE

## 2024-01-13 PROCEDURE — 25010000002 ONDANSETRON PER 1 MG: Performed by: INTERNAL MEDICINE

## 2024-01-13 PROCEDURE — 85027 COMPLETE CBC AUTOMATED: CPT | Performed by: NURSE PRACTITIONER

## 2024-01-13 PROCEDURE — 93880 EXTRACRANIAL BILAT STUDY: CPT

## 2024-01-13 RX ORDER — PROMETHAZINE HYDROCHLORIDE 25 MG/1
25 TABLET ORAL EVERY 6 HOURS PRN
Status: DISCONTINUED | OUTPATIENT
Start: 2024-01-13 | End: 2024-01-16 | Stop reason: HOSPADM

## 2024-01-13 RX ORDER — ROSUVASTATIN CALCIUM 20 MG/1
20 TABLET, COATED ORAL NIGHTLY
Status: DISCONTINUED | OUTPATIENT
Start: 2024-01-13 | End: 2024-01-16 | Stop reason: HOSPADM

## 2024-01-13 RX ORDER — POTASSIUM CHLORIDE 750 MG/1
40 TABLET, FILM COATED, EXTENDED RELEASE ORAL ONCE
Status: COMPLETED | OUTPATIENT
Start: 2024-01-13 | End: 2024-01-13

## 2024-01-13 RX ORDER — ATENOLOL 25 MG/1
25 TABLET ORAL 2 TIMES DAILY
Status: DISCONTINUED | OUTPATIENT
Start: 2024-01-13 | End: 2024-01-16 | Stop reason: HOSPADM

## 2024-01-13 RX ORDER — ONDANSETRON 2 MG/ML
4 INJECTION INTRAMUSCULAR; INTRAVENOUS EVERY 6 HOURS PRN
Status: DISCONTINUED | OUTPATIENT
Start: 2024-01-13 | End: 2024-01-16 | Stop reason: HOSPADM

## 2024-01-13 RX ORDER — LIDOCAINE 4 G/G
1 PATCH TOPICAL
Status: DISCONTINUED | OUTPATIENT
Start: 2024-01-13 | End: 2024-01-16 | Stop reason: HOSPADM

## 2024-01-13 RX ADMIN — POTASSIUM CHLORIDE 40 MEQ: 750 TABLET, EXTENDED RELEASE ORAL at 11:44

## 2024-01-13 RX ADMIN — DULOXETINE HYDROCHLORIDE 90 MG: 60 CAPSULE, DELAYED RELEASE ORAL at 08:27

## 2024-01-13 RX ADMIN — LIDOCAINE 1 PATCH: 4 PATCH TOPICAL at 11:42

## 2024-01-13 RX ADMIN — ONDANSETRON HYDROCHLORIDE 4 MG: 2 INJECTION, SOLUTION INTRAMUSCULAR; INTRAVENOUS at 11:43

## 2024-01-13 RX ADMIN — LISINOPRIL 40 MG: 40 TABLET ORAL at 08:27

## 2024-01-13 RX ADMIN — HYDROCODONE BITARTRATE AND ACETAMINOPHEN 1 TABLET: 5; 325 TABLET ORAL at 05:00

## 2024-01-13 RX ADMIN — ATENOLOL 25 MG: 25 TABLET ORAL at 21:00

## 2024-01-13 RX ADMIN — DOCUSATE SODIUM 50MG AND SENNOSIDES 8.6MG 2 TABLET: 8.6; 5 TABLET, FILM COATED ORAL at 08:31

## 2024-01-13 RX ADMIN — Medication 10 ML: at 08:30

## 2024-01-13 RX ADMIN — ASPIRIN 81 MG: 81 TABLET, COATED ORAL at 08:27

## 2024-01-13 RX ADMIN — CARVEDILOL 25 MG: 25 TABLET, FILM COATED ORAL at 08:27

## 2024-01-13 RX ADMIN — ENOXAPARIN SODIUM 70 MG: 100 INJECTION SUBCUTANEOUS at 01:17

## 2024-01-13 RX ADMIN — ROSUVASTATIN CALCIUM 20 MG: 20 TABLET, FILM COATED ORAL at 21:00

## 2024-01-13 RX ADMIN — Medication 10 ML: at 21:01

## 2024-01-13 RX ADMIN — ENOXAPARIN SODIUM 70 MG: 100 INJECTION SUBCUTANEOUS at 11:43

## 2024-01-13 NOTE — PROGRESS NOTES
"CC: Paroxysmal atrial fibrillation    Interval History: No patient complaining of vertigo and nausea      Vital Signs  Temp:  [96.4 °F (35.8 °C)-98.5 °F (36.9 °C)] 97.9 °F (36.6 °C)  Heart Rate:  [58-77] 77  Resp:  [16-17] 17  BP: (110-177)/(72-96) 110/74    Intake/Output Summary (Last 24 hours) at 1/13/2024 1137  Last data filed at 1/13/2024 0836  Gross per 24 hour   Intake 1300 ml   Output 975 ml   Net 325 ml     Flowsheet Rows      Flowsheet Row First Filed Value   Admission Height 162.6 cm (64\") Documented at 01/12/2024 0041   Admission Weight 65.8 kg (145 lb) Documented at 01/12/2024 0041            PHYSICAL EXAM:  General: No acute distress  Resp:NL Rate, symmetric chest expansion,unlabored, clear  CV:NL rate and rhythm, NL PMI, NL S1 and S2, no Murmur, no gallop, no rub, No JVD.   ABD:Nl sounds, no masses or tenderness, nondistended, no guarding or rebound  Neuro: alert,cooperative and oriented  Extr:Normal pedal pulses, No edema or cyanosis, moves all extremities      Results Review:    Results from last 7 days   Lab Units 01/13/24  0331   SODIUM mmol/L 140   POTASSIUM mmol/L 3.4*   CHLORIDE mmol/L 109*   CO2 mmol/L 22.0   BUN mg/dL 11   CREATININE mg/dL 0.65   GLUCOSE mg/dL 81   CALCIUM mg/dL 8.4*     Results from last 7 days   Lab Units 01/12/24  1407 01/12/24  1155 01/12/24  0117   HSTROP T ng/L 22* 20* 19*     Results from last 7 days   Lab Units 01/13/24  0331   WBC 10*3/mm3 6.02   HEMOGLOBIN g/dL 12.8   HEMATOCRIT % 39.4   PLATELETS 10*3/mm3 149     Results from last 7 days   Lab Units 01/12/24  0605   INR  1.03     Results from last 7 days   Lab Units 01/12/24  0605   CHOLESTEROL mg/dL 133     Results from last 7 days   Lab Units 01/12/24  0117   MAGNESIUM mg/dL 1.9     Results from last 7 days   Lab Units 01/12/24  0605   CHOLESTEROL mg/dL 133   TRIGLYCERIDES mg/dL 58   HDL CHOL mg/dL 39*   LDL CHOL mg/dL 82     I reviewed the patient's new clinical results.  I personally viewed and interpreted the " patient's EKG/Telemetry data        Medication Review:   Meds reviewed         Assessment/Plan  Ms. Avelar has syncopal episode with fall yesterday morning while she was walking to her kitchen.  She felt weak and lightheaded before passing out.  She hit his head but without significant injuries.  She eventually crawled on the floor and got in phone and called her son.  She reports palpitations.  She was noted to be in A-fib with RVR per rhythm strip done by EMS.  She converted back to sinus on arrival to the ER.  She started having chest heaviness after she woke up that eventually resolved with aspirin and nitroglycerin.  No significant recurrence of chest pain overnight.  At baseline she ambulates using a cane because of prior right femur surgery.  She denies rest or exertional chest discomfort in the past.  She has been feeling fairly unwell the past couple of days.  She reports chronic intermittent palpitations at home     Syncope-likely from hypotension upon standing walking likely aggravated by A-fib with RVR. No orthostatic hypotension   Borderline elevated troponin/chest discomfort-A-fib with RVR may be contributing.  Troponins flat.  Normal echocardiogram.  Paroxysmal atrial fibrillation-new diagnosis. [id number] score of 4. CT head wo bleed  Essential hypertension-on amlodipine, carvedilol and lisinopril at home.  Significant hypercholesterolemia with LDL of 216 in the past on Crestor-repeat lipid panel is excellent  Tobacco use      Patient complaining of vertigo-like symptoms and nausea along with headache-suspect she may be experiencing postconcussion syndrome.  As needed Zofran/Phenergan placed  Remains in sinus rhythm  She was going to get stress test this a.m. but because of not feeling well deferred for tomorrow.  Excellent blood pressure control  Otherwise no major acute events overnight.        Austyn Roberts MD  01/13/24  11:37 EST

## 2024-01-13 NOTE — PLAN OF CARE
Goal Outcome Evaluation:  Plan of Care Reviewed With: patient           Outcome Evaluation: Patient is a 79 y.o female who presented to [facility] with chest pain along with syncopal episode with collapse. Patient AOx4 supine in bed upon arrival. Patient lives at home alone. Patient reports her sons check on her. Patient reports she ambulates with rwx at baseline. Patient completed all bed mobility with SBA. Patient reported dizziness throughout todays session. /70 while seated at EOB. Patient stood up at EOB with CGA. /74. Patient ambulated 15ft with rwx and CGA. Gait very slow and shuffled with patient reporting increased nausea. No overt LOB noted. Patient reclined in chair at end of session. Patient would continue to benefit from skilled PT intervention to address deficits in functional mobility and maximize safety and independence. PT will continue to monitor and progress as tolerated.      Anticipated Discharge Disposition (PT): home with assist, home with home health, skilled nursing facility (pending progress)

## 2024-01-13 NOTE — PROGRESS NOTES
"    DAILY PROGRESS NOTE  [facility]    Patient Identification:  Name: Gita Avelar  Age: 79 y.o.  Sex: female  :  1945  MRN: [id number]         Primary Care Physician: Mark Hernández DO    Subjective:  Interval History: Feels kind of blah all over.  She is not really complain of any chest pain or shortness of breath.  She complains more about joint arthritic and neck discomfort.  She has no loss of function and no acute myelopathy.  She has bilateral  and overall muscle strength in upper and lower extremities is preserved.  No fevers no chills some nausea but no emesis or abdominal pain    Objective: Sitting up in bed conversational and pleasant.  RN at bedside though no family noted.  She is nontoxic in appearance and has fluent speech    Scheduled Meds:aspirin, 81 mg, Oral, Daily  carvedilol, 25 mg, Oral, BID With Meals  DULoxetine, 90 mg, Oral, Daily  enoxaparin, 1 mg/kg, Subcutaneous, Q12H  lisinopril, 40 mg, Oral, Daily  rosuvastatin, 40 mg, Oral, Nightly  senna-docusate sodium, 2 tablet, Oral, BID  sodium chloride, 10 mL, Intravenous, Q12H      Continuous Infusions:     Vital signs in last 24 hours:  Temp:  [96.4 °F (35.8 °C)-98.5 °F (36.9 °C)] 98.4 °F (36.9 °C)  Heart Rate:  [58-67] 67  Resp:  [16-17] 17  BP: (123-177)/(72-96) 157/86    Intake/Output:    Intake/Output Summary (Last 24 hours) at 2024 0850  Last data filed at 2024 0836  Gross per 24 hour   Intake 1300 ml   Output 975 ml   Net 325 ml       Exam:  /86 (BP Location: Right arm, Patient Position: Lying)   Pulse 67   Temp 98.4 °F (36.9 °C) (Oral)   Resp 17   Ht 162.6 cm (64\")   Wt 65.8 kg (145 lb)   SpO2 95%   BMI 24.89 kg/m²     General Appearance:    Alert, cooperative/conversational, AAOx3                          Head:    Normocephalic, without obvious abnormality, atraumatic                         Throat:   Oral mucosa pink and moist                           Neck:   No JVD               "           Lungs:    Clear to auscultation bilaterally, respirations unlabored                 Chest Wall:    No tenderness or deformity                          Heart:  Irregular rate and rhythm, S1 and S2 normal                  Abdomen:     Soft, nontender, bowel sounds active                 Extremities: Moving all with baseline strength,  symmetric 5 out of 5, no cyanosis or pitting edema                        Pulses:   Pulses palpable in all extremities                  Neurologic:   CNII-XII intact       Data Review:  Labs in chart were reviewed.    Assessment:  Active Hospital Problems    Diagnosis  POA    **Syncope and collapse [R55]  Yes    Atrial fibrillation with RVR [I48.91]  Yes    Chest pain [R07.9]  Yes    Left knee pain [M25.562]  Yes    Neck pain on left side [M54.2]  Yes    Dysautonomia [G90.1]  Yes    Atherosclerosis of both carotid arteries [I65.23]  Yes    Essential hypertension [I10]  Yes    HLD (hyperlipidemia) [E78.5]  Yes      Resolved Hospital Problems   No resolved problems to display.       Plan:    Chest pain with syncope with A-fib/RVR and mild elevation troponin   -Therapeutic Lovenox -further AC alteration/management per cardiology   -Appreciate cardiology input and assistance.  Considering stress test   -Echo with normal EF though abnormal septal balance with RBBB   -Carotid Dopplers pending   -Saline lock IVF   -Coreg p.o./lisinopril   -HLD on statin   -Add TSH and mag to next lab draw      Chronic neck pain with degenerative changes and diffuse osteoarthritis   -CT of the neck demonstrates multilevel degenerative changes   -Neurologically the patient is intact   -This can further be followed up as an outpatient as cardiac issues trump this and nothing is seemingly emergent.    -Recommend PT and OT and will trial Lidoderm to neck   -Norco as needed, heating pad, supportive care      Replace K x1      Labs otherwise unremarkable      Disposition -awaiting carotid Doppler and  cardiology   -Pending the above, CCP to assist with discharge in near future    Juan Yang MD  1/13/2024  08:50 EST

## 2024-01-13 NOTE — PLAN OF CARE
Goal Outcome Evaluation:    VSS; A&Ox4. Pt continues on NS gtt per order. Pt with c/o of pain controlled by prn pain med. Pt remains SR on the monitor and room air. Pt expresses no additional needs at this time. Pt safety maintained. Plan of care is ongoing.

## 2024-01-13 NOTE — PLAN OF CARE
Goal Outcome Evaluation:  Plan of Care Reviewed With: patient        Progress: no change  Outcome Evaluation: Complaints of dizziness at times, VSS. Nausea x 1 Zofran given with good relief. Family here to celebrate birthday. NPO after MN for Stress test in am.

## 2024-01-13 NOTE — THERAPY EVALUATION
Patient Name: Gita Avelar  : 1945    MRN: [id number]                              Today's Date: 2024       Admit Date: 2024    Visit Dx:     ICD-10-CM ICD-9-CM   1. Syncope and collapse  R55 780.2   2. Chest pain, unspecified type  R07.9 786.50   3. Shortness of breath  R06.02 786.05   4. Elevated troponin  R79.89 790.6     Patient Active Problem List   Diagnosis    Avitaminosis D    B12 deficiency    Near syncope    Atrophic vaginitis    Awareness of heartbeats    OP (osteoporosis)    HLD (hyperlipidemia)    Fatigue    Essential hypertension    Moderate episode of recurrent major depressive disorder    Nipple discharge    Abnormal ultrasound of breast    Abnormal mammogram    Lump or mass in breast    Family history of malignant neoplasm of pancreas    Atherosclerosis of both carotid arteries    Acute UTI    COVID-19 virus infection    Hypoxia    Streptococcal septicemia    Generalized weakness    Dizziness on standing    History of Guillain-Bruneau syndrome    History of subdural hematoma    Dysautonomia    Cytokine release syndrome, grade 1    Syncope and collapse    Atrial fibrillation with RVR    Chest pain    Left knee pain    Neck pain on left side     Past Medical History:   Diagnosis Date    Arthritis     HANDS    B12 deficiency     Bladder prolapse, female, acquired     Depression     Diverticulitis of colon     History of Guillain-Bruneau syndrome     History of subdural hematoma     Hyperlipidemia     Macular degeneration of left eye     Osteoporosis     Stress incontinence     Vitamin D deficiency      Past Surgical History:   Procedure Laterality Date    BREAST BIOPSY      BREAST BIOPSY Left 2017    Procedure: left breast excisional biopsy using a lacrimal duct probe and the intraoperative ultrasound. ;  Surgeon: Mallory Lieberman MD;  [place] [facility] [place] [facility] [place]  Service:     HYSTERECTOMY        General Information       Row Name 24 1108          Physical Therapy Time and  Intention    Document Type evaluation  -     Mode of Treatment individual therapy;physical therapy  -       Row Name 01/13/24 1108          General Information    Patient Profile Reviewed yes  -     Prior Level of Function independent:  -     Existing Precautions/Restrictions fall  -       Row Name 01/13/24 1108          Living Environment    People in Home alone  -       Row Name 01/13/24 1108          Home Main Entrance    Number of Stairs, Main Entrance none  -       Row Name 01/13/24 1108          Cognition    Orientation Status (Cognition) oriented x 4  -       Row Name 01/13/24 1108          Safety Issues, Functional Mobility    Impairments Affecting Function (Mobility) balance;endurance/activity tolerance;strength  -               User Key  (r) = Recorded By, (t) = Taken By, (c) = Cosigned By      Initials Name Provider Type     Suzanne Steele PT Physical Therapist                   Mobility       Row Name 01/13/24 1109          Bed Mobility    Bed Mobility supine-sit  -     Supine-Sit [place] (Bed Mobility) standby assist  -     Assistive Device (Bed Mobility) head of bed elevated;bed rails  -     Comment, (Bed Mobility) Patient UIC at end of session  -       Row Name 01/13/24 1109          Sit-Stand Transfer    Sit-Stand [place] (Transfers) contact guard;verbal cues  -     Assistive Device (Sit-Stand Transfers) walker, front-wheeled  -[facility] Name 01/13/24 1109          Gait/Stairs (Locomotion)    [place] Level (Gait) contact guard;verbal cues  -     Assistive Device (Gait) walker, front-wheeled  -     Distance in Feet (Gait) 15ft  -     Deviations/Abnormal Patterns (Gait) gait speed decreased;dexter decreased;festinating/shuffling  -     Comment, (Gait/Stairs) Gait very slow and shuffled with patient reporting increased nausea. No overt LOB noted.  -               User Key  (r) = Recorded By, (t) = Taken By, (c) = Cosigned By      Initials  Name Provider Type     Suzanne Steele PT Physical Therapist                   Obj/Interventions       Row Name 01/13/24 1110          Range of Motion Comprehensive    General Range of Motion bilateral lower extremity ROM WFL  -       Row Name 01/13/24 1110          Strength Comprehensive (MMT)    General Manual Muscle Testing (MMT) Assessment lower extremity strength deficits identified  -     Comment, General Manual Muscle Testing (MMT) Assessment B LEs generalized weakness  -       Row Name 01/13/24 1110          Motor Skills    Therapeutic Exercise other (see comments)  AP, LAQs x10  -       Row Name 01/13/24 1110          Balance    Balance Assessment sitting static balance;sitting dynamic balance;sit to stand dynamic balance;standing static balance;standing dynamic balance  -     Static Sitting Balance supervision  -     Dynamic Sitting Balance standby assist  -     Position, Sitting Balance sitting edge of bed  -     Sit to Stand Dynamic Balance contact guard;verbal cues  -     Static Standing Balance contact guard  -     Dynamic Standing Balance contact guard  -     Position/Device Used, Standing Balance supported;walker, front-wheeled  -     Balance Interventions sitting;standing;sit to stand;supported;static;dynamic  -               User Key  (r) = Recorded By, (t) = Taken By, (c) = Cosigned By      Initials Name Provider Type     Suzanne Steele PT Physical Therapist                   Goals/Plan       Row Name 01/13/24 1114          Bed Mobility Goal 1 (PT)    Activity/Assistive Device (Bed Mobility Goal 1, PT) bed mobility activities, all  -     [place] Level/Cues Needed (Bed Mobility Goal 1, PT) modified independence  -     Time Frame (Bed Mobility Goal 1, PT) 1 week  -       Row Name 01/13/24 1114          Transfer Goal 1 (PT)    Activity/Assistive Device (Transfer Goal 1, PT) sit-to-stand/stand-to-sit;bed-to-chair/chair-to-bed  -     [place]  Level/Cues Needed (Transfer Goal 1, PT) modified independence  -SM     Time Frame (Transfer Goal 1, PT) 1 week  -SM       Row Name 01/13/24 1114          Gait Training Goal 1 (PT)    Activity/Assistive Device (Gait Training Goal 1, PT) gait (walking locomotion);walker, rolling  -SM     [place] Level (Gait Training Goal 1, PT) supervision required  -SM     Distance (Gait Training Goal 1, PT) 200ft  -SM     Time Frame (Gait Training Goal 1, PT) 1 week  -SM               User Key  (r) = Recorded By, (t) = Taken By, (c) = Cosigned By      Initials Name Provider Type    SM Suzanne Steele, PT Physical Therapist                   Clinical Impression       Row Name 01/13/24 1110          Pain    Pretreatment Pain Rating 0/10 - no pain  -SM     Posttreatment Pain Rating 0/10 - no pain  -SM       Row Name 01/13/24 1110          Plan of Care Review    Plan of Care Reviewed With patient  -SM     Outcome Evaluation Patient is a 79 y.o female who presented to [facility] with chest pain along with syncopal episode with collapse. Patient AOx4 supine in bed upon arrival. Patient lives at home alone. Patient reports her sons check on her. Patient reports she ambulates with rwx at baseline. Patient completed all bed mobility with SBA. Patient reported dizziness throughout todays session. /70 while seated at EOB. Patient stood up at EOB with CGA. /74. Patient ambulated 15ft with rwx and CGA. Gait very slow and shuffled with patient reporting increased nausea. No overt LOB noted. Patient reclined in chair at end of session. Patient would continue to benefit from skilled PT intervention to address deficits in functional mobility and maximize safety and independence. PT will continue to monitor and progress as tolerated.  -       Row Name 01/13/24 1110          Therapy Assessment/Plan (PT)    Rehab Potential (PT) good, to achieve stated therapy goals  -     Criteria for Skilled Interventions Met (PT) yes  -     Therapy  Frequency (PT) 6 times/wk  -       Row Name 01/13/24 1110          Vital Signs    O2 Delivery Pre Treatment room air  -SM     O2 Delivery Intra Treatment room air  -SM     O2 Delivery Post Treatment room air  -SM     Pre Patient Position Supine  -SM     Intra Patient Position Standing  -SM     Post Patient Position Sitting  -SM       Row Name 01/13/24 1110          Positioning and Restraints    Pre-Treatment Position in bed  -SM     Post Treatment Position chair  -SM     In Chair notified nsg;reclined;call light within reach;encouraged to call for assist;exit alarm on  -               User Key  (r) = Recorded By, (t) = Taken By, (c) = Cosigned By      Initials Name Provider Type    Suzanne Carlos PT Physical Therapist                   Outcome Measures       Row Name 01/13/24 1114 01/13/24 0827       How much help from another person do you currently need...    Turning from your back to your side while in flat bed without using bedrails? 4  -SM 4  -AC    Moving from lying on back to sitting on the side of a flat bed without bedrails? 4  -SM 4  -AC    Moving to and from a bed to a chair (including a wheelchair)? 3  -SM 3  -AC    Standing up from a chair using your arms (e.g., wheelchair, bedside chair)? 3  -SM 3  -AC    Climbing 3-5 steps with a railing? 2  -SM 2  -AC    To walk in hospital room? 3  -SM 3  -AC    AM-PAC 6 Clicks Score (PT) 19  -SM 19  -AC    Highest Level of Mobility Goal 6 --> Walk 10 steps or more  - 6 --> Walk 10 steps or more  -      Row Name 01/13/24 1114          Functional Assessment    Outcome Measure Options AM-PAC 6 Clicks Basic Mobility (PT)  -               User Key  (r) = Recorded By, (t) = Taken By, (c) = Cosigned By      Initials Name Provider Type    Magali Harris, RN Registered Nurse    Suzanne Carlos PT Physical Therapist                                 Physical Therapy Education       Title: PT OT SLP Therapies (Done)       Topic: Physical Therapy  (Done)       Point: Mobility training (Done)       Learning Progress Summary             Patient Acceptance, E, VU by  at 1/13/2024 1115                         Point: Home exercise program (Done)       Learning Progress Summary             Patient Acceptance, E, VU by  at 1/13/2024 1115                         Point: Body mechanics (Done)       Learning Progress Summary             Patient Acceptance, E, VU by  at 1/13/2024 1115                         Point: Precautions (Done)       Learning Progress Summary             Patient Acceptance, E, VU by  at 1/13/2024 1115                                         User Key       Initials Effective Dates Name Provider Type Discipline     05/02/22 -  Suzanne Steele, PT Physical Therapist PT                  PT Recommendation and Plan     Plan of Care Reviewed With: patient  Outcome Evaluation: Patient is a 79 y.o female who presented to [facility] with chest pain along with syncopal episode with collapse. Patient AOx4 supine in bed upon arrival. Patient lives at home alone. Patient reports her sons check on her. Patient reports she ambulates with rwx at baseline. Patient completed all bed mobility with SBA. Patient reported dizziness throughout todays session. /70 while seated at EOB. Patient stood up at EOB with CGA. /74. Patient ambulated 15ft with rwx and CGA. Gait very slow and shuffled with patient reporting increased nausea. No overt LOB noted. Patient reclined in chair at end of session. Patient would continue to benefit from skilled PT intervention to address deficits in functional mobility and maximize safety and independence. PT will continue to monitor and progress as tolerated.     Time Calculation:         PT Charges       Row Name 01/13/24 1115             Time Calculation    Start Time 1025  -      Stop Time 1040  -      Time Calculation (min) 15 min  -      PT Received On 01/13/24  -      PT - Next Appointment 01/15/24  -      PT  Goal Re-Cert Due Date 01/20/24  -         Time Calculation- PT    Total Timed Code Minutes- PT 8 minute(s)  -SM         Timed Charges    42702 - PT Therapeutic Activity Minutes 8  -SM         Total Minutes    Timed Charges Total Minutes 8  -SM       Total Minutes 8  -SM                User Key  (r) = Recorded By, (t) = Taken By, (c) = Cosigned By      Initials Name Provider Type     Suzanne Steele, PT Physical Therapist                  Therapy Charges for Today       Code Description Service Date Service Provider Modifiers Qty    78170879992 HC PT THERAPEUTIC ACT EA 15 MIN 1/13/2024 Suzanne Steele, PT GP 1    37710490227 HC PT EVAL LOW COMPLEXITY 3 1/13/2024 Suzanne Steele, PT GP 1            PT G-Codes  Outcome Measure Options: AM-PAC 6 Clicks Basic Mobility (PT)  AM-PAC 6 Clicks Score (PT): 19  PT Discharge Summary  Anticipated Discharge Disposition (PT): home with assist, home with home health, skilled nursing facility (pending progress)    Suzanne Steele PT  1/13/2024

## 2024-01-14 ENCOUNTER — APPOINTMENT (OUTPATIENT)
Dept: NUCLEAR MEDICINE | Facility: HOSPITAL | Age: 79
End: 2024-01-14
Payer: MEDICARE

## 2024-01-14 LAB
ANION GAP SERPL CALCULATED.3IONS-SCNC: 11.1 MMOL/L (ref 5–15)
BH CV REST NUCLEAR ISOTOPE DOSE: 11.7 MCI
BH CV STRESS COMMENTS STAGE 1: NORMAL
BH CV STRESS DOSE REGADENOSON STAGE 1: 0.4
BH CV STRESS DURATION MIN STAGE 1: 0
BH CV STRESS DURATION SEC STAGE 1: 10
BH CV STRESS GRADE STAGE 1: 10
BH CV STRESS METS STAGE 1: 5
BH CV STRESS NUCLEAR ISOTOPE DOSE: 35 MCI
BH CV STRESS PROTOCOL 1: NORMAL
BH CV STRESS RECOVERY BP: NORMAL MMHG
BH CV STRESS RECOVERY HR: 86 BPM
BH CV STRESS SPEED STAGE 1: 1.7
BH CV STRESS STAGE 1: 1
BUN SERPL-MCNC: 12 MG/DL (ref 8–23)
BUN/CREAT SERPL: 19 (ref 7–25)
CALCIUM SPEC-SCNC: 8.6 MG/DL (ref 8.6–10.5)
CHLORIDE SERPL-SCNC: 108 MMOL/L (ref 98–107)
CO2 SERPL-SCNC: 21.9 MMOL/L (ref 22–29)
CREAT SERPL-MCNC: 0.63 MG/DL (ref 0.57–1)
EGFRCR SERPLBLD CKD-EPI 2021: 90.4 ML/MIN/1.73
GLUCOSE SERPL-MCNC: 73 MG/DL (ref 65–99)
LV EF NUC BP: 77 %
MAGNESIUM SERPL-MCNC: 2 MG/DL (ref 1.6–2.4)
MAXIMAL PREDICTED HEART RATE: 141 BPM
PERCENT MAX PREDICTED HR: 65.25 %
POTASSIUM SERPL-SCNC: 4.2 MMOL/L (ref 3.5–5.2)
SODIUM SERPL-SCNC: 141 MMOL/L (ref 136–145)
STRESS BASELINE BP: NORMAL MMHG
STRESS BASELINE HR: 66 BPM
STRESS PERCENT HR: 77 %
STRESS POST PEAK BP: NORMAL MMHG
STRESS POST PEAK HR: 92 BPM
STRESS TARGET HR: 120 BPM
TSH SERPL DL<=0.05 MIU/L-ACNC: 1.97 UIU/ML (ref 0.27–4.2)

## 2024-01-14 PROCEDURE — 25010000002 REGADENOSON 0.4 MG/5ML SOLUTION: Performed by: HOSPITALIST

## 2024-01-14 PROCEDURE — 78452 HT MUSCLE IMAGE SPECT MULT: CPT | Performed by: INTERNAL MEDICINE

## 2024-01-14 PROCEDURE — 83735 ASSAY OF MAGNESIUM: CPT | Performed by: HOSPITALIST

## 2024-01-14 PROCEDURE — 93018 CV STRESS TEST I&R ONLY: CPT | Performed by: INTERNAL MEDICINE

## 2024-01-14 PROCEDURE — 80048 BASIC METABOLIC PNL TOTAL CA: CPT | Performed by: HOSPITALIST

## 2024-01-14 PROCEDURE — 93016 CV STRESS TEST SUPVJ ONLY: CPT | Performed by: INTERNAL MEDICINE

## 2024-01-14 PROCEDURE — 99214 OFFICE O/P EST MOD 30 MIN: CPT | Performed by: INTERNAL MEDICINE

## 2024-01-14 PROCEDURE — 93017 CV STRESS TEST TRACING ONLY: CPT

## 2024-01-14 PROCEDURE — A9500 TC99M SESTAMIBI: HCPCS | Performed by: HOSPITALIST

## 2024-01-14 PROCEDURE — 25010000002 ENOXAPARIN PER 10 MG: Performed by: INTERNAL MEDICINE

## 2024-01-14 PROCEDURE — 78452 HT MUSCLE IMAGE SPECT MULT: CPT

## 2024-01-14 PROCEDURE — 84443 ASSAY THYROID STIM HORMONE: CPT | Performed by: HOSPITALIST

## 2024-01-14 PROCEDURE — 0 TECHNETIUM SESTAMIBI: Performed by: HOSPITALIST

## 2024-01-14 RX ORDER — REGADENOSON 0.08 MG/ML
0.4 INJECTION, SOLUTION INTRAVENOUS
Status: COMPLETED | OUTPATIENT
Start: 2024-01-14 | End: 2024-01-14

## 2024-01-14 RX ORDER — AMLODIPINE BESYLATE 5 MG/1
5 TABLET ORAL
Status: DISCONTINUED | OUTPATIENT
Start: 2024-01-14 | End: 2024-01-16 | Stop reason: HOSPADM

## 2024-01-14 RX ADMIN — ATENOLOL 25 MG: 25 TABLET ORAL at 13:51

## 2024-01-14 RX ADMIN — REGADENOSON 0.4 MG: 0.08 INJECTION, SOLUTION INTRAVENOUS at 12:06

## 2024-01-14 RX ADMIN — ENOXAPARIN SODIUM 70 MG: 100 INJECTION SUBCUTANEOUS at 13:57

## 2024-01-14 RX ADMIN — LIDOCAINE 1 PATCH: 4 PATCH TOPICAL at 08:31

## 2024-01-14 RX ADMIN — ENOXAPARIN SODIUM 70 MG: 100 INJECTION SUBCUTANEOUS at 00:43

## 2024-01-14 RX ADMIN — ACETAMINOPHEN 650 MG: 325 TABLET, FILM COATED ORAL at 01:34

## 2024-01-14 RX ADMIN — AMLODIPINE BESYLATE 5 MG: 5 TABLET ORAL at 16:05

## 2024-01-14 RX ADMIN — LISINOPRIL 40 MG: 40 TABLET ORAL at 08:24

## 2024-01-14 RX ADMIN — Medication 10 ML: at 20:20

## 2024-01-14 RX ADMIN — TECHNETIUM TC 99M SESTAMIBI 1 DOSE: 1 INJECTION INTRAVENOUS at 09:10

## 2024-01-14 RX ADMIN — DULOXETINE HYDROCHLORIDE 90 MG: 60 CAPSULE, DELAYED RELEASE ORAL at 13:51

## 2024-01-14 RX ADMIN — ROSUVASTATIN CALCIUM 20 MG: 20 TABLET, FILM COATED ORAL at 20:20

## 2024-01-14 RX ADMIN — ATENOLOL 25 MG: 25 TABLET ORAL at 23:03

## 2024-01-14 RX ADMIN — ENOXAPARIN SODIUM 70 MG: 100 INJECTION SUBCUTANEOUS at 23:03

## 2024-01-14 RX ADMIN — Medication 10 ML: at 08:30

## 2024-01-14 RX ADMIN — TECHNETIUM TC 99M SESTAMIBI 1 DOSE: 1 INJECTION INTRAVENOUS at 12:06

## 2024-01-14 NOTE — PROGRESS NOTES
"    DAILY PROGRESS NOTE  [facility]    Patient Identification:  Name: Gita Avelar  Age: 79 y.o.  Sex: female  :  1945  MRN: [id number]         Primary Care Physician: Mark Hernández DO    Subjective:  Interval History: Patient seems to be complaining about very small not all that relevant issues but then also tells me she feels better overall.  I specifically inquired about her cervical pain and she is having absolutely no aspects of radiculopathy.  No concerning aspects for cord impingement nor is there any saddle anesthesia or bowel incontinence.  No fevers.  Again she states her neck feels much better with the Lidoderm and supportive management and she would like to hold on any further MRI imaging of the neck and given the lack of radicular symptoms I have no opposition to that    Objective: Clinically looks much better to me.  Conversational pleasant and nontoxic.  No family present at bedside.  Case discussed with RN    Scheduled Meds:atenolol, 25 mg, Oral, BID  DULoxetine, 90 mg, Oral, Daily  enoxaparin, 1 mg/kg, Subcutaneous, Q12H  Lidocaine, 1 patch, Transdermal, Q24H  lisinopril, 40 mg, Oral, Daily  rosuvastatin, 20 mg, Oral, Nightly  sodium chloride, 10 mL, Intravenous, Q12H      Continuous Infusions:     Vital signs in last 24 hours:  Temp:  [97.6 °F (36.4 °C)-98.9 °F (37.2 °C)] 97.6 °F (36.4 °C)  Heart Rate:  [65-80] 65  Resp:  [16-18] 16  BP: (110-170)/(68-95) 165/88    Intake/Output:    Intake/Output Summary (Last 24 hours) at 2024 1020  Last data filed at 2024 0810  Gross per 24 hour   Intake 240 ml   Output 500 ml   Net -260 ml       Exam:  /88 (BP Location: Left arm, Patient Position: Lying)   Pulse 65   Temp 97.6 °F (36.4 °C) (Oral)   Resp 16   Ht 162.6 cm (64\")   Wt 65.8 kg (145 lb)   SpO2 96%   BMI 24.89 kg/m²     General Appearance:    Alert, cooperative, elderly and frail though nontoxic                         Throat:   Oral mucosa pink " and moist                         Lungs:    Clear to auscultation bilaterally, respirations unlabored                          Heart:    Regular rate and rhythm, S1 and S2 normal                  Abdomen:     Soft, nontender, bowel sounds active                  Neurologic:   CNII-XII intact, bilateral upper extremity  strength symmetric and equal at 5 out of 5, she has no issues with fine motor control      Data Review:  Labs in chart were reviewed.    Assessment:  Active Hospital Problems    Diagnosis  POA    **Syncope and collapse [R55]  Yes    Atrial fibrillation with RVR [I48.91]  Yes    Chest pain [R07.9]  Yes    Left knee pain [M25.562]  Yes    Neck pain on left side [M54.2]  Yes    Dysautonomia [G90.1]  Yes    Atherosclerosis of both carotid arteries [I65.23]  Yes    Essential hypertension [I10]  Yes    HLD (hyperlipidemia) [E78.5]  Yes      Resolved Hospital Problems   No resolved problems to display.       Plan:    Cardiology performed stress test today    Further AC per cardiology recommendations    Carotid Dopplers demonstrate bilateral less than 50% occlusion    Echo with normal EF though septal imbalance with RBBB    TSH within normal limits    P.o. Coreg/lisinopril    Chronic neck pain with secondary degenerative changes status post fall   -CT shows multilevel changes   -If her pain was persisting, I would have considered MRI of her cervical spine today.  I offered this to patient but she would like to hold off on this as she states her neck pain is tremendously better and is not voicing any radicular complaints.  We have been offering supportive care with Lidoderm heating pad and as needed Norco   -PT recommending skilled   -OT pending   -CCP for DC needs    Juan Yang MD  1/14/2024  10:20 EST

## 2024-01-14 NOTE — PLAN OF CARE
Goal Outcome Evaluation:  Plan of Care Reviewed With: patient           Outcome Evaluation: Patient with c/o neck pain after lidocaine patch removed. Patient given x2 tylenol with relief of her pain. Patient b/p 130-150's/60-90's HR 70's remains SR. Patient had one elevated b/p over 170's last night and this am. Will continue to monitor and await am stress test. Patient NPO since midnight.

## 2024-01-14 NOTE — PROGRESS NOTES
"CC: Paroxysmal atrial fibrillation    Interval History: No new acute events overnight      Vital Signs  Temp:  [97.6 °F (36.4 °C)-98.9 °F (37.2 °C)] 98.9 °F (37.2 °C)  Heart Rate:  [68-80] 68  Resp:  [16-18] 16  BP: (110-157)/(68-92) 157/92    Intake/Output Summary (Last 24 hours) at 1/14/2024 0757  Last data filed at 1/14/2024 0700  Gross per 24 hour   Intake 1240 ml   Output 500 ml   Net 740 ml     Flowsheet Rows      Flowsheet Row First Filed Value   Admission Height 162.6 cm (64\") Documented at 01/12/2024 0041   Admission Weight 65.8 kg (145 lb) Documented at 01/12/2024 0041            PHYSICAL EXAM:  General: No acute distress  Resp:NL Rate, symmetric chest expansion,unlabored, clear  CV:NL rate and rhythm, NL PMI, NL S1 and S2, no Murmur, no gallop, no rub, No JVD.   ABD:Nl sounds, no masses or tenderness, nondistended, no guarding or rebound  Neuro: alert,cooperative and oriented  Extr:Normal pedal pulses, No edema or cyanosis, moves all extremities      Results Review:    Results from last 7 days   Lab Units 01/14/24  0428   SODIUM mmol/L 141   POTASSIUM mmol/L 4.2   CHLORIDE mmol/L 108*   CO2 mmol/L 21.9*   BUN mg/dL 12   CREATININE mg/dL 0.63   GLUCOSE mg/dL 73   CALCIUM mg/dL 8.6     Results from last 7 days   Lab Units 01/12/24  1407 01/12/24  1155 01/12/24  0117   HSTROP T ng/L 22* 20* 19*     Results from last 7 days   Lab Units 01/13/24  0331   WBC 10*3/mm3 6.02   HEMOGLOBIN g/dL 12.8   HEMATOCRIT % 39.4   PLATELETS 10*3/mm3 149     Results from last 7 days   Lab Units 01/12/24  0605   INR  1.03     Results from last 7 days   Lab Units 01/12/24  0605   CHOLESTEROL mg/dL 133     Results from last 7 days   Lab Units 01/14/24  0428   MAGNESIUM mg/dL 2.0     Results from last 7 days   Lab Units 01/12/24  0605   CHOLESTEROL mg/dL 133   TRIGLYCERIDES mg/dL 58   HDL CHOL mg/dL 39*   LDL CHOL mg/dL 82     I reviewed the patient's new clinical results.  I personally viewed and interpreted the patient's " EKG/Telemetry data        Medication Review:   Meds reviewed         Assessment/Plan    Syncope-likely from hypotension upon standing walking likely aggravated by A-fib with RVR. No orthostatic hypotension   Borderline elevated troponin/chest discomfort-A-fib with RVR may be contributing.  Troponins flat.  Normal echocardiogram.  Paroxysmal atrial fibrillation-new diagnosis. [id number] score of 4. CT head wo bleed  Essential hypertension-on amlodipine, carvedilol and lisinopril at home.  Significant hypercholesterolemia with LDL of 216 in the past on Crestor-repeat lipid panel is excellent  Tobacco use   Vertigo/nausea     No acute events overnight.  She feels better with regards to nausea and vertigo and ambulating with help.  She had a normal myocardial perfusion study today.  Blood pressure running on the higher side-add amlodipine 5 mg p.o. daily.  She would benefit from inpatient rehab to improve strength and reduce risk of fall.  DC on 2-week Zio.  Follow-up in cardiac clinic 1 month after discharge.    I have discussed plan with her nurse.    Austyn Roberts MD  01/14/24  07:57 EST

## 2024-01-14 NOTE — PLAN OF CARE
Goal Outcome Evaluation:  Plan of Care Reviewed With: patient        Progress: improving  Outcome Evaluation: Increased SBP today, BP greater in right arm than in left Dr. Roberts Notified and aware. Norvasc started tolerating well.

## 2024-01-15 PROCEDURE — 97165 OT EVAL LOW COMPLEX 30 MIN: CPT

## 2024-01-15 PROCEDURE — 97535 SELF CARE MNGMENT TRAINING: CPT

## 2024-01-15 PROCEDURE — 97110 THERAPEUTIC EXERCISES: CPT

## 2024-01-15 PROCEDURE — 25010000002 ENOXAPARIN PER 10 MG: Performed by: INTERNAL MEDICINE

## 2024-01-15 RX ORDER — ACETAMINOPHEN 325 MG/1
650 TABLET ORAL EVERY 4 HOURS PRN
Start: 2024-01-15

## 2024-01-15 RX ORDER — ATENOLOL 25 MG/1
25 TABLET ORAL 2 TIMES DAILY
Start: 2024-01-15

## 2024-01-15 RX ADMIN — ENOXAPARIN SODIUM 70 MG: 100 INJECTION SUBCUTANEOUS at 11:55

## 2024-01-15 RX ADMIN — Medication 10 ML: at 22:08

## 2024-01-15 RX ADMIN — ATENOLOL 25 MG: 25 TABLET ORAL at 08:44

## 2024-01-15 RX ADMIN — LISINOPRIL 40 MG: 40 TABLET ORAL at 08:44

## 2024-01-15 RX ADMIN — AMLODIPINE BESYLATE 5 MG: 5 TABLET ORAL at 08:45

## 2024-01-15 RX ADMIN — DULOXETINE HYDROCHLORIDE 90 MG: 60 CAPSULE, DELAYED RELEASE ORAL at 08:44

## 2024-01-15 RX ADMIN — ROSUVASTATIN CALCIUM 20 MG: 20 TABLET, FILM COATED ORAL at 22:07

## 2024-01-15 RX ADMIN — LIDOCAINE 1 PATCH: 4 PATCH TOPICAL at 08:45

## 2024-01-15 RX ADMIN — ACETAMINOPHEN 650 MG: 325 TABLET, FILM COATED ORAL at 22:09

## 2024-01-15 RX ADMIN — ENOXAPARIN SODIUM 70 MG: 100 INJECTION SUBCUTANEOUS at 22:07

## 2024-01-15 RX ADMIN — ATENOLOL 25 MG: 25 TABLET ORAL at 22:07

## 2024-01-15 RX ADMIN — ACETAMINOPHEN 650 MG: 325 TABLET, FILM COATED ORAL at 16:59

## 2024-01-15 RX ADMIN — Medication 10 ML: at 08:47

## 2024-01-15 NOTE — PLAN OF CARE
Goal Outcome Evaluation:  Plan of Care Reviewed With: patient        Progress: improving  Outcome Evaluation: axox4. 1 L nc applied while sleeping. BP improving, atenolol given. pt denies pain, SOA. denied dizziness when up to toilet. denies nausea. possible rehab placement.

## 2024-01-15 NOTE — DISCHARGE PLACEMENT REQUEST
"Gita Avelar (79 y.o. Female)       Date of Birth   1945    Social Security Number       Address   [address] [place] [address]    Home Phone   [phone]    MRN   [id number]       Denominational   None    Marital Status                               Admission Date   1/12/24    Admission Type   Emergency    Admitting Provider   Arnie Posada MD    Attending Provider   Juan Yang MD    Department, Room/Bed   [facility] CARDIOVASC UNIT, 2213/1       Discharge Date       Discharge Disposition   Skilled Nursing Facility (DC - External)    Discharge Destination                                 Attending Provider: Juan Yang MD    Allergies: No Known Allergies    Isolation: None   Infection: None   Code Status: No CPR    Ht: 162.6 cm (64\")   Wt: 65.8 kg (145 lb)    Admission Cmt: None   Principal Problem: Syncope and collapse [R55]                   Active Insurance as of 1/12/2024       Primary Coverage       Payor Plan Insurance Group Employer/Plan Group    MEDICARE MEDICARE A & B        Payor Plan Address Payor Plan Phone Number Payor Plan Fax Number Effective Dates    [address] [phone]  1/1/2010 - None Entered    [facility] [id number]         Subscriber Name Subscriber Birth Date Member ID       GITA AVELAR 1945 [id number]               Secondary Coverage       Payor Plan Insurance Group Employer/Plan Group    AARP [employer] SUP AARP HEALTH CARE OPTIONS PLAN K       Payor Plan Address Payor Plan Phone Number Payor Plan Fax Number Effective Dates    [facility] [phone]  1/1/2016 - None Entered    [address]       [facility] [id number]         Subscriber Name Subscriber Birth Date Member ID       GITA AVELAR 1945 [id number]                     Emergency Contacts        (Rel.) Home Phone Work Phone Mobile Phone    Mahesh Avelar (Son) [phone] -- --    PINEDACHARITY (Daughter) " [phone] -- --

## 2024-01-15 NOTE — NURSING NOTE
Unable to find placement for rehab today. Bed likely available tomorrow. Discharge order cancelled per Dr. Yang. Anticipate d/c tomorrow. Will continue with current POC and update as needed.

## 2024-01-15 NOTE — CASE MANAGEMENT/SOCIAL WORK
Discharge Planning Assessment  [facility]     Patient Name: Gita Avelar  MRN: [id number]  Today's Date: 1/15/2024    Admit Date: 1/12/2024    Plan: SNF evals pending   Discharge Needs Assessment       Row Name 01/15/24 1230       Living Environment    People in Home alone    Current Living Arrangements apartment    Potentially Unsafe Housing Conditions none    In the past 12 months has the electric, gas, oil, or water company threatened to shut off services in your home? No    Primary Care Provided by self;child(dominique)    Provides Primary Care For no one    Family Caregiver if Needed child(dominique), adult    Family Caregiver Names Son/Mahesh & daughter/Shantelle    Quality of Family Relationships helpful;involved;supportive    Able to Return to Prior Arrangements yes       Resource/Environmental Concerns    Resource/Environmental Concerns none    Transportation Concerns none       Transportation Needs    In the past 12 months, has lack of transportation kept you from medical appointments or from getting medications? no    In the past 12 months, has lack of transportation kept you from meetings, work, or from getting things needed for daily living? No       Food Insecurity    Within the past 12 months, you worried that your food would run out before you got the money to buy more. Never true    Within the past 12 months, the food you bought just didn't last and you didn't have money to get more. Never true       Transition Planning    Patient/Family Anticipates Transition to other (see comments)  SNF    Patient/Family Anticipated Services at Transition skilled nursing    Transportation Anticipated family or friend will provide       Discharge Needs Assessment    Readmission Within the Last 30 Days no previous admission in last 30 days    Equipment Currently Used at Home rollator;cane, straight;shower chair;grab bar;bp cuff;pulse ox    Concerns to be Addressed discharge planning    Anticipated Changes Related to Illness  none    Equipment Needed After Discharge none    Discharge Facility/Level of Care Needs nursing facility, skilled    Provided Post Acute Provider List? Yes    Provided Post Acute Provider Quality & Resource List? Yes    Post Acute Provider Quality and Resource List Nursing Home    Delivered To Patient    Method of Delivery In person    Patient's Choice of Community Agency(s) 1. [facility], 2. [facility], 3. [facility]    Current Discharge Risk lives alone                   Discharge Plan       Row Name 01/15/24 1232       Plan    Plan SNF evals pending    Plan Comments CCP spoke with pleasant Pt at bedside.  CCP role explained and discharge planning discussed.  Face sheet verified.  Pt stated she is IADL's, retired and no longer drives.  Pt children, Mahesh & Shantelle Avelar provide her transportation.  Pt lives alone in a first-floor apartment.  Daughter/Shantelle does her medi-planner every Sunday.  Pt reports PCP is, Mark Hernández.  Pt confirmed pharmacy is, [facility].  Pt has used [facility] in the past and has been to [facility] for past sub-acute rehab.  Pt has the following DME- straight cane, rollator, shower chair, grab barsx2, BP cuff monitor.  Pt plans to go to rehab at discharge.  Pt rehab choices are 1. [facility], 2. [facility] and 3. [facility].  CCP sent referrals to [facility] and [facility].  CCP will continue to follow…….Tish BELL /.                  Continued Care and Services - Admitted Since 1/12/2024       Destination       Service Provider Request Status Selected Services Address Phone Fax Patient Preferred    [facility] [address] [facility] [address] [phone] --    [facility] Pending - Request Sent N/A [address] [facility] [address] [phone] --                  Expected Discharge Date and Time       Expected Discharge Date Expected  Discharge Time    Gregg 15, 2024            Demographic Summary       Row Name 01/15/24 1228       General Information    Admission Type inpatient    Arrived From emergency department    Required Notices Provided Important Message from Medicare    Referral Source admission list;nursing    Reason for Consult discharge planning    Preferred Language English       Contact Information    Permission Granted to Share Info With family/designee                   Functional Status       Row Name 01/15/24 1229       Functional Status    Usual Activity Tolerance moderate    Current Activity Tolerance fair       Assessment of Health Literacy    How often do you have someone help you read hospital materials? Occasionally    Health Literacy Good       Functional Status, IADL    Medications assistive person  daughter completes her medi-planner on Sundays    Meal Preparation assistive person    Housekeeping assistive equipment    Laundry assistive equipment    Shopping assistive equipment and person    IADL Comments Uses straight cane or rollator when ambulating.       Mental Status    General Appearance WDL WDL       Mental Status Summary    Recent Changes in Mental Status/Cognitive Functioning no changes       Employment/    Employment Status retired                   Psychosocial    No documentation.                  Abuse/Neglect    No documentation.                  Legal    No documentation.                  Substance Abuse    No documentation.                  Patient Forms    No documentation.                     Tish Parada RN

## 2024-01-15 NOTE — PLAN OF CARE
Goal Outcome Evaluation:  Plan of Care Reviewed With: patient           Outcome Evaluation: Pt seen for OT eval this AM. Admitted with syncopal episode at home. PMHx includes GBS, HTN, HLD. Pt reports she lives alone with no KATEY or within home. She is usually independent with ADLs and functional mobility using a rollator within her home. Today pt was sitting UIC upon arrival and required CGA for STS and short distance in room to sink to perform grooming tasks. Pt unable to complete distance into bathroom this AM due to increased pain in knee and fatigue. Pt presents with decreased strength, endurance, activity tolerance, ADL performance and functional mobility. At this time OT rec SNF for a safe dc home.      Anticipated Discharge Disposition (OT): skilled nursing facility

## 2024-01-15 NOTE — THERAPY EVALUATION
Patient Name: Gita Avelar  : 1945    MRN: [id number]                              Today's Date: 1/15/2024       Admit Date: 2024    Visit Dx:     ICD-10-CM ICD-9-CM   1. Syncope and collapse  R55 780.2   2. Chest pain, unspecified type  R07.9 786.50   3. Shortness of breath  R06.02 786.05   4. Elevated troponin  R79.89 790.6   5. Paroxysmal atrial fibrillation  I48.0 427.31     Patient Active Problem List   Diagnosis    Avitaminosis D    B12 deficiency    Near syncope    Atrophic vaginitis    Awareness of heartbeats    OP (osteoporosis)    HLD (hyperlipidemia)    Fatigue    Essential hypertension    Moderate episode of recurrent major depressive disorder    Nipple discharge    Abnormal ultrasound of breast    Abnormal mammogram    Lump or mass in breast    Family history of malignant neoplasm of pancreas    Atherosclerosis of both carotid arteries    Acute UTI    COVID-19 virus infection    Hypoxia    Streptococcal septicemia    Generalized weakness    Dizziness on standing    History of Guillain-Buffalo syndrome    History of subdural hematoma    Dysautonomia    Cytokine release syndrome, grade 1    Syncope and collapse    Atrial fibrillation with RVR    Chest pain    Left knee pain    Neck pain on left side     Past Medical History:   Diagnosis Date    Arthritis     HANDS    B12 deficiency     Bladder prolapse, female, acquired     Depression     Diverticulitis of colon     History of Guillain-Buffalo syndrome     History of subdural hematoma     Hyperlipidemia     Macular degeneration of left eye     Osteoporosis     Stress incontinence     Vitamin D deficiency      Past Surgical History:   Procedure Laterality Date    BREAST BIOPSY      BREAST BIOPSY Left 2017    Procedure: left breast excisional biopsy using a lacrimal duct probe and the intraoperative ultrasound. ;  Surgeon: Mallory Lieberman MD;  [place] [facility] [place] [facility]  Service:     HYSTERECTOMY        General Information       Row Name  01/15/24 1148          OT Time and Intention    Document Type evaluation  -     Mode of Treatment individual therapy;occupational therapy  -[facility] Name 01/15/24 1148          General Information    Patient Profile Reviewed yes  -     Prior Level of Function independent:  -     Existing Precautions/Restrictions fall  -[facility] Name 01/15/24 1148          Living Environment    People in Home alone  -KA       Row Name 01/15/24 1148          Home Main Entrance    Number of Stairs, Main Entrance none  -KA       Row Name 01/15/24 1148          Stairs Within Home, Primary    Number of Stairs, Within Home, Primary none  -KA       Row Name 01/15/24 1148          Cognition    Orientation Status (Cognition) oriented x 4  -[facility] Name 01/15/24 1148          Safety Issues, Functional Mobility    Impairments Affecting Function (Mobility) balance;endurance/activity tolerance;strength  -               User Key  (r) = Recorded By, (t) = Taken By, (c) = Cosigned By      Initials Name Provider Type     Norah Welch, OT Occupational Therapist                     Mobility/ADL's       [facility] Name 01/15/24 1205          Bed Mobility    Comment, (Bed Mobility) UI upon arrival and at the end of session  -KA       Row Name 01/15/24 1205          Sit-Stand Transfer    Sit-Stand [place] (Transfers) contact guard;verbal cues  -     Assistive Device (Sit-Stand Transfers) walker, front-wheeled  -KA       Row Name 01/15/24 1205          Functional Mobility    Functional Mobility- Ind. Level contact guard assist;1 person  -     Functional Mobility- Device walker, front-wheeled  -     Functional Mobility-Distance (Feet) --  within room to sink  -KA       Row Name 01/15/24 1205          Activities of Daily Living    BADL Assessment/Intervention lower body dressing;grooming  -KA       Row Name 01/15/24 1205          Grooming Assessment/Training    [place] Level (Grooming) grooming skills;wash face,  hands;oral care regimen;contact guard assist  -KA     Comment, (Grooming) CGA for standing balance at the sink. Unable to complete distance to the bathroom due to pain in knee and fatigue. Stopped at the sink within room  -               User Key  (r) = Recorded By, (t) = Taken By, (c) = Cosigned By      Initials Name Provider Type    Norah Neal OT Occupational Therapist                   Obj/Interventions       Row Name 01/15/24 1213          Range of Motion Comprehensive    General Range of Motion bilateral upper extremity ROM WFL  -     Comment, General Range of Motion BUE WFL  -KA       Row Name 01/15/24 1213          Strength Comprehensive (MMT)    General Manual Muscle Testing (MMT) Assessment upper extremity strength deficits identified  -KA     Comment, General Manual Muscle Testing (MMT) Assessment --  BUE grossly 3+/5  -KA       Row Name 01/15/24 1213          Balance    Static Sitting Balance supervision  -KA     Dynamic Sitting Balance supervision  -KA     Static Standing Balance contact guard  -     Dynamic Standing Balance contact guard  -     Position/Device Used, Standing Balance walker, front-wheeled  -     Balance Interventions sitting;standing;occupation based/functional task;sit to stand  -               User Key  (r) = Recorded By, (t) = Taken By, (c) = Cosigned By      Initials Name Provider Type    Norah Neal OT Occupational Therapist                   Goals/Plan       Row Name 01/15/24 1240          Bed Mobility Goal 1 (OT)    Activity/Assistive Device (Bed Mobility Goal 1, OT) bed mobility activities, all  -KA     [place] Level/Cues Needed (Bed Mobility Goal 1, OT) modified independence  -     Time Frame (Bed Mobility Goal 1, OT) short term goal (STG);2 weeks  -       Row Name 01/15/24 1240          Transfer Goal 1 (OT)    Activity/Assistive Device (Transfer Goal 1, OT) transfers, all  -KA     [place] Level/Cues Needed (Transfer Goal 1, OT)  modified independence  -KA     Time Frame (Transfer Goal 1, OT) short term goal (STG);2 weeks  -KA     Progress/Outcome (Transfer Goal 1, OT) new goal  -KA       Row Name 01/15/24 1240          Dressing Goal 1 (OT)    Activity/Device (Dressing Goal 1, OT) dressing skills, all;upper body dressing;lower body dressing  -KA     [place]/Cues Needed (Dressing Goal 1, OT) modified independence  -KA     Time Frame (Dressing Goal 1, OT) short term goal (STG);2 weeks  -KA     Progress/Outcome (Dressing Goal 1, OT) new goal  -KA       Row Name 01/15/24 1240          Toileting Goal 1 (OT)    Activity/Device (Toileting Goal 1, OT) toileting skills, all  -KA     [place] Level/Cues Needed (Toileting Goal 1, OT) modified independence  -KA     Time Frame (Toileting Goal 1, OT) short term goal (STG);2 weeks  -KA     Progress/Outcome (Toileting Goal 1, OT) new goal  -KA       Row Name 01/15/24 1240          Grooming Goal 1 (OT)    Activity/Device (Grooming Goal 1, OT) grooming skills, all  -KA     [place] (Grooming Goal 1, OT) modified independence  -KA     Time Frame (Grooming Goal 1, OT) short term goal (STG);2 weeks  -KA     Progress/Outcome (Grooming Goal 1, OT) new [facility]  -       Row Name 01/15/24 1240          Therapy Assessment/Plan (OT)    Planned Therapy Interventions (OT) activity tolerance training;BADL retraining;functional balance retraining;occupation/activity based interventions;patient/caregiver education/training;strengthening exercise;transfer/mobility retraining  -               User Key  (r) = Recorded By, (t) = Taken By, (c) = Cosigned By      Initials Name Provider Type    Norah Neal, OT Occupational Therapist                   Clinical Impression       Row Name 01/15/24 1216          Pain Assessment    Pretreatment Pain Rating 0/10 - no pain  -     Posttreatment Pain Rating 0/10 - no pain  -       Row Name 01/15/24 1216          Plan of Care Review    Plan of Care Reviewed With  patient  -     Outcome Evaluation Pt seen for OT eval this AM. Admitted with syncopal episode at home. PMHx includes GBS, HTN, HLD. Pt reports she lives alone with no KATEY or within home. She is usually independent with ADLs and functional mobility using a rollator within her home. Today pt was sitting UIC upon arrival and required CGA for STS and short distance in room to sink to perform grooming tasks. Pt unable to complete distance into bathroom this AM due to increased pain in knee and fatigue. Performed UB strength ther ex seated in chair for endurance and strength. Pt presents with decreased strength, endurance, activity tolerance, ADL performance and functional mobility. At this time OT rec SNF for a safe dc home.  -       Row Name 01/15/24 1216          Therapy Assessment/Plan (OT)    Rehab Potential (OT) good, to achieve stated therapy goals  -     Criteria for Skilled Therapeutic Interventions Met (OT) yes  -     Therapy Frequency (OT) 5 times/wk  -       Row Name 01/15/24 1216          Therapy Plan Review/Discharge Plan (OT)    Anticipated Discharge Disposition (OT) skilled nursing facility  -       Row Name 01/15/24 1216          Vital Signs    Pre Patient Position Supine  -KA     Intra Patient Position Standing  -KA     Post Patient Position Sitting  -       Row Name 01/15/24 1216          Positioning and Restraints    Pre-Treatment Position sitting in chair/recliner  -KA     Post Treatment Position chair  -KA     In Chair notified nsg;reclined;call light within reach;encouraged to call for assist;exit alarm on  -               User Key  (r) = Recorded By, (t) = Taken By, (c) = Cosigned By      Initials Name Provider Type    Norah Neal, OT Occupational Therapist                   Outcome Measures       Row Name 01/15/24 1240          How much help from another is currently needed...    Putting on and taking off regular lower body clothing? 2  -KA     Bathing (including washing,  rinsing, and drying) 3  -KA     Toileting (which includes using toilet bed pan or urinal) 2  -KA     Putting on and taking off regular upper body clothing 3  -KA     Taking care of personal grooming (such as brushing teeth) 3  -KA     Eating meals 3  -KA     AM-PAC 6 Clicks Score (OT) 16  -KA       Row Name 01/15/24 1119 01/15/24 0845       How much help from another person do you currently need...    Turning from your back to your side while in flat bed without using bedrails? 4  -PC 4  -SD    Moving from lying on back to sitting on the side of a flat bed without bedrails? 3  -PC 4  -SD    Moving to and from a bed to a chair (including a wheelchair)? 3  -PC 3  -SD    Standing up from a chair using your arms (e.g., wheelchair, bedside chair)? 3  -PC 3  -SD    Climbing 3-5 steps with a railing? 2  -PC 2  -SD    To walk in hospital room? 3  -PC 3  -SD    AM-PAC 6 Clicks Score (PT) 18  -PC 19  -SD    Highest Level of Mobility Goal 6 --> Walk 10 steps or more  -PC 6 --> Walk 10 steps or more  -SD      Row Name 01/15/24 1240          Functional Assessment    Outcome Measure Options AM-PAC 6 Clicks Daily Activity (OT)  -KA               User Key  (r) = Recorded By, (t) = Taken By, (c) = Cosigned By      Initials Name Provider Type    Sinai Knott, PT Physical Therapist    Shari Clement, RN Registered Nurse    Norah Neal, OT Occupational Therapist                    Occupational Therapy Education       Title: PT OT SLP Therapies (Done)       Topic: Occupational Therapy (Done)       Point: ADL training (Done)       Description:   Instruct learner(s) on proper safety adaptation and remediation techniques during self care or transfers.   Instruct in proper use of assistive devices.                  Learning Progress Summary             Patient Acceptance, E, LINH,DU by ROSENDA at 1/15/2024 1241                         Point: Home exercise program (Done)       Description:   Instruct learner(s) on appropriate  technique for monitoring, assisting and/or progressing therapeutic exercises/activities.                  Learning Progress Summary             Patient Acceptance, E, VU,DU by ROSENDA at 1/15/2024 1241                         Point: Precautions (Done)       Description:   Instruct learner(s) on prescribed precautions during self-care and functional transfers.                  Learning Progress Summary             Patient Acceptance, E, VU,DU by ROSENDA at 1/15/2024 1241                                         User Key       Initials Effective Dates Name Provider Type Discipline    ROSENDA 09/22/22 -  Norah Weclh OT Occupational Therapist OT                  OT Recommendation and Plan  Planned Therapy Interventions (OT): activity tolerance training, BADL retraining, functional balance retraining, occupation/activity based interventions, patient/caregiver education/training, strengthening exercise, transfer/mobility retraining  Therapy Frequency (OT): 5 times/wk  Plan of Care Review  Plan of Care Reviewed With: patient  Outcome Evaluation: Pt seen for OT eval this AM. Admitted with syncopal episode at home. PMHx includes GBS, HTN, HLD. Pt reports she lives alone with no KATEY or within home. She is usually independent with ADLs and functional mobility using a rollator within her home. Today pt was sitting UIC upon arrival and required CGA for STS and short distance in room to sink to perform grooming tasks. Pt unable to complete distance into bathroom this AM due to increased pain in knee and fatigue. Performed UB strength ther ex seated in chair for endurance and strength. Pt presents with decreased strength, endurance, activity tolerance, ADL performance and functional mobility. At this time OT rec SNF for a safe dc home.     Time Calculation:   Evaluation Complexity (OT)  Review Occupational Profile/Medical/Therapy History Complexity: brief/low complexity  Assessment, Occupational Performance/Identification of Deficit  Complexity: 1-3 performance deficits  Clinical Decision Making Complexity (OT): problem focused assessment/low complexity  Overall Complexity of Evaluation (OT): low complexity     Time Calculation- OT       Row Name 01/15/24 1241             Time Calculation- OT    OT Start Time 1120  -KA      OT Stop Time 1140  -KA      OT Time Calculation (min) 20 min  -KA      Total Timed Code Minutes- OT 10 minute(s)  -KA      OT Received On 01/15/24  -KA      OT - Next Appointment 01/16/24  -KA      OT Goal Re-Cert Due Date 01/29/24  -KA         Timed Charges    57438 - OT Self Care/Mgmt Minutes 10  -KA         Untimed Charges    OT Eval/Re-eval Minutes 10  -KA         Total Minutes    Timed Charges Total Minutes 10  -KA      Untimed Charges Total Minutes 10  -KA       Total Minutes 20  -KA                User Key  (r) = Recorded By, (t) = Taken By, (c) = Cosigned By      Initials Name Provider Type    Norah Neal, OT Occupational Therapist                  Therapy Charges for Today       Code Description Service Date Service Provider Modifiers Qty    78268818369 HC OT SELF CARE/MGMT/TRAIN EA 15 MIN 1/15/2024 Norah Welch, OT GO 1    16258947314 HC OT EVAL LOW COMPLEXITY 3 1/15/2024 Norah Welch, OT GO 1                 Norah Welch OT  1/15/2024

## 2024-01-15 NOTE — THERAPY TREATMENT NOTE
Patient Name: Gita Avelar  : 1945    MRN: [id number]                              Today's Date: 1/15/2024       Admit Date: 2024    Visit Dx:     ICD-10-CM ICD-9-CM   1. Syncope and collapse  R55 780.2   2. Chest pain, unspecified type  R07.9 786.50   3. Shortness of breath  R06.02 786.05   4. Elevated troponin  R79.89 790.6   5. Paroxysmal atrial fibrillation  I48.0 427.31     Patient Active Problem List   Diagnosis    Avitaminosis D    B12 deficiency    Near syncope    Atrophic vaginitis    Awareness of heartbeats    OP (osteoporosis)    HLD (hyperlipidemia)    Fatigue    Essential hypertension    Moderate episode of recurrent major depressive disorder    Nipple discharge    Abnormal ultrasound of breast    Abnormal mammogram    Lump or mass in breast    Family history of malignant neoplasm of pancreas    Atherosclerosis of both carotid arteries    Acute UTI    COVID-19 virus infection    Hypoxia    Streptococcal septicemia    Generalized weakness    Dizziness on standing    History of Guillain-Flushing syndrome    History of subdural hematoma    Dysautonomia    Cytokine release syndrome, grade 1    Syncope and collapse    Atrial fibrillation with RVR    Chest pain    Left knee pain    Neck pain on left side     Past Medical History:   Diagnosis Date    Arthritis     HANDS    B12 deficiency     Bladder prolapse, female, acquired     Depression     Diverticulitis of colon     History of Guillain-Flushing syndrome     History of subdural hematoma     Hyperlipidemia     Macular degeneration of left eye     Osteoporosis     Stress incontinence     Vitamin D deficiency      Past Surgical History:   Procedure Laterality Date    BREAST BIOPSY      BREAST BIOPSY Left 2017    Procedure: left breast excisional biopsy using a lacrimal duct probe and the intraoperative ultrasound. ;  Surgeon: Mallory Lieberman MD;  [place] [facility] [place] [facility] [place]  Service:     HYSTERECTOMY        General Information       Row Name  01/15/24 1113          Physical Therapy Time and Intention    Document Type therapy note (daily note)  -PC     Mode of Treatment physical therapy  -PC       Row Name 01/15/24 1113          General Information    Patient Profile Reviewed yes  -PC     Existing Precautions/Restrictions fall  -PC       Row Name 01/15/24 1113          Cognition    Orientation Status (Cognition) oriented x 4  -PC       Row Name 01/15/24 1113          Safety Issues, Functional Mobility    Impairments Affecting Function (Mobility) balance;endurance/activity tolerance;strength  -PC               User Key  (r) = Recorded By, (t) = Taken By, (c) = Cosigned By      Initials Name Provider Type    PC Sinai Wilson PT Physical Therapist                   Mobility       Row Name 01/15/24 1113          Bed Mobility    Comment, (Bed Mobility) in bathroom with aide  -PC       Row Name 01/15/24 1113          Sit-Stand Transfer    Sit-Stand [place] (Transfers) contact guard;verbal cues  -PC     Assistive Device (Sit-Stand Transfers) walker, front-wheeled  -PC       Row Name 01/15/24 1113          Gait/Stairs (Locomotion)    [place] Level (Gait) contact guard;verbal cues  -PC     Assistive Device (Gait) walker, front-wheeled  -PC     Distance in Feet (Gait) 30 ft  -PC     Deviations/Abnormal Patterns (Gait) gait speed decreased;dexter decreased;festinating/shuffling  -PC     Comment, (Gait/Stairs) VERY short steps with shuffling gait, can inc step length with verbal cues but then reverts back to shuffling after a few steps  -PC               User Key  (r) = Recorded By, (t) = Taken By, (c) = Cosigned By      Initials Name Provider Type    PC Sinai Wilson PT Physical Therapist                   Obj/Interventions       Row Name 01/15/24 1116          Balance    Static Sitting Balance supervision  -PC     Dynamic Sitting Balance supervision  -PC     Position, Sitting Balance sitting edge of bed  -PC     Static Standing Balance contact  guard  -PC     Dynamic Standing Balance contact guard  -PC     Position/Device Used, Standing Balance walker, rolling  -PC               User Key  (r) = Recorded By, (t) = Taken By, (c) = Cosigned By      Initials Name Provider Type    PC Sinai Wilson, PT Physical Therapist                   Goals/Plan    No documentation.                  Clinical Impression       Row Name 01/15/24 1116          Pain    Pre/Posttreatment Pain Comment pt reports general soreness all over  -PC       Row Name 01/15/24 1116          Plan of Care Review    Plan of Care Reviewed With patient  -PC     Progress improving  -PC     Outcome Evaluation Pt able to walk a little farther today, still feeling very weak, pt reports generalized pain all over from the fall, she fatigues quickly and has poor gait quality with short, shuffling steps, pt amb 30 ft with a rolling walker and CGA. Pt is a fall risk and concern to ret home alone, rec SNF at this time  -PC       Row Name 01/15/24 1116          Positioning and Restraints    Pre-Treatment Position bathroom  -PC     Post Treatment Position chair  -PC     In Chair reclined;call light within reach;encouraged to call for assist;exit alarm on  -PC               User Key  (r) = Recorded By, (t) = Taken By, (c) = Cosigned By      Initials Name Provider Type    PC Sinai Wilson, PT Physical Therapist                   Outcome Measures       Row Name 01/15/24 1119 01/15/24 0845       How much help from another person do you currently need...    Turning from your back to your side while in flat bed without using bedrails? 4  -PC 4  -SD    Moving from lying on back to sitting on the side of a flat bed without bedrails? 3  -PC 4  -SD    Moving to and from a bed to a chair (including a wheelchair)? 3  -PC 3  -SD    Standing up from a chair using your arms (e.g., wheelchair, bedside chair)? 3  -PC 3  -SD    Climbing 3-5 steps with a railing? 2  -PC 2  -SD    To walk in hospital room? 3  -PC 3  -SD     AM-PAC 6 Clicks Score (PT) 18  -PC 19  -SD    Highest Level of Mobility Goal 6 --> Walk 10 steps or more  -PC 6 --> Walk 10 steps or more  -SD              User Key  (r) = Recorded By, (t) = Taken By, (c) = Cosigned By      Initials Name Provider Type    PC Sinai Wilson, PT Physical Therapist    Shari Clement, RN Registered Nurse                                 Physical Therapy Education       Title: PT OT SLP Therapies (Done)       Topic: Physical Therapy (Done)       Point: Mobility training (Done)       Learning Progress Summary             Patient Acceptance, E,D, DU by  at 1/15/2024 1120    Acceptance, E, VU by  at 1/15/2024 0448    Acceptance, E, VU by  at 1/13/2024 1115                         Point: Home exercise program (Done)       Learning Progress Summary             Patient Acceptance, E, VU by  at 1/15/2024 0448    Acceptance, E, VU by  at 1/13/2024 1115                         Point: Body mechanics (Done)       Learning Progress Summary             Patient Acceptance, E,D, DU by  at 1/15/2024 1120    Acceptance, E, VU by BM at 1/15/2024 0448    Acceptance, E, VU by  at 1/13/2024 1115                         Point: Precautions (Done)       Learning Progress Summary             Patient Acceptance, E,D, DU by  at 1/15/2024 1120    Acceptance, E, VU by  at 1/15/2024 0448    Acceptance, E, VU by  at 1/13/2024 1115                                         User Key       Initials Effective Dates Name Provider Type Discipline     06/16/21 -  Sinai Wilson PT Physical Therapist PT     06/16/21 -  Lilly Sweet, RN Registered Nurse Nurse     05/02/22 -  Suzanne Steele PT Physical Therapist PT                  PT Recommendation and Plan     Plan of Care Reviewed With: patient  Progress: improving  Outcome Evaluation: Pt able to walk a little farther today, still feeling very weak, pt reports generalized pain all over from the fall, she fatigues quickly and has poor gait  quality with short, shuffling steps, pt amb 30 ft with a rolling walker and CGA. Pt is a fall risk and concern to ret home alone, rec SNF at this time     Time Calculation:         PT Charges       Row Name 01/15/24 1120             Time Calculation    Start Time 1100  -PC      Stop Time 1115  -PC      Time Calculation (min) 15 min  -PC      PT Received On 01/15/24  -PC      PT - Next Appointment 01/16/24  -PC                User Key  (r) = Recorded By, (t) = Taken By, (c) = Cosigned By      Initials Name Provider Type    PC Sinai Wilson, PT Physical Therapist                  Therapy Charges for Today       Code Description Service Date Service Provider Modifiers Qty    96723606026 HC PT THER PROC EA 15 MIN 1/15/2024 Sinai Wilson PT GP 1            PT G-Codes  Outcome Measure Options: AM-PAC 6 Clicks Basic Mobility (PT)  AM-PAC 6 Clicks Score (PT): 18  PT Discharge Summary  Anticipated Discharge Disposition (PT): skilled nursing facility    Sinai Wilson PT  1/15/2024

## 2024-01-15 NOTE — PLAN OF CARE
Goal Outcome Evaluation:  Plan of Care Reviewed With: patient        Progress: improving  Outcome Evaluation: Pt able to walk a little farther today, still feeling very weak, pt reports generalized pain all over from the fall, she fatigues quickly and has poor gait quality with short, shuffling steps, pt amb 30 ft with a rolling walker and CGA. Pt is a fall risk and concern to ret home alone, rec SNF at this time      Anticipated Discharge Disposition (PT): skilled nursing facility

## 2024-01-15 NOTE — DISCHARGE SUMMARY
[facility]    Date of Discharge:  1/15/2024    PCP: Mark eHrnández DO    Discharge Diagnosis:   Active Hospital Problems    Diagnosis  POA    **Syncope and collapse [R55]  Yes    Atrial fibrillation with RVR [I48.91]  Yes    Chest pain [R07.9]  Yes    Left knee pain [M25.562]  Yes    Neck pain on left side [M54.2]  Yes    Dysautonomia [G90.1]  Yes    Atherosclerosis of both carotid arteries [I65.23]  Yes    Essential hypertension [I10]  Yes    HLD (hyperlipidemia) [E78.5]  Yes      Resolved Hospital Problems   No resolved problems to display.          Consults       Date and Time Order Name Status Description    1/12/2024  5:34 AM Inpatient Cardiology Consult Completed     1/12/2024  5:11 AM [facility] (on-call MD unless specified) Details            Hospital Course  79 y.o. female initially admitted for complaints of chest pain with questionable syncope.  Cardiology saw in consultation as patient presented with A-fib with RVR and mildly elevated troponin.  She was given temporary therapeutic Lovenox and deferred further AC management to cardiology.  By the time of discharge they do not recommend long-term anticoagulation and they are okay with discharge with no additional transition to p.o. medication.  They checked an echocardiogram which showed normal EF with aspects of abnormal septal balance with RBBB.  That led to cardiac stress testing which was unremarkable.  We also check carotid Dopplers which demonstrated less than 50% occlusion bilaterally.  Further testing of thyroid studies show her TSH is within normal limits.  She is managed on p.o. beta-blocker and lisinopril with overall stable vitals.  Her beta-blocker with Coreg was exchanged for atenolol.  She did complain of some neck pain was secondary to her fall and further imaging demonstrated multilevel degenerative changes.  We treated her supportively over the first 24 hours and if there was any  further decline in radicular symptoms, I would have considered an MRI but the patient has improved daily and she has no radiculopathy so no further consultation or imaging was pursued.  PT is evaluated as well as OT and the plan now is to transition to subacute rehab.  CCP is coordinating her needs  And patient is otherwise medically stable for transfer today if the logistics are finalized.  All questions answered to the patient she is thankful for the care she received while here while minimal to the above plan.      Addendum -on day of discharge I reached out discussed case directly with cardiology MD  regarding further anticoagulation.  He does want the patient switched over to Eliquis given the fact that she is going to rehab.  Hopefully this patient makes improvement while at rehab in regards to gait stability because if not and Falls become recurrent, this needs to be reevaluated.  Previous use aspirin discontinued with the initiation of AC.  Additional cardiac monitoring post discharge noted per cardiology as well      Temp:  [98.5 °F (36.9 °C)-99 °F (37.2 °C)] 99 °F (37.2 °C)  Heart Rate:  [63-74] 72  Resp:  [16-19] 16  BP: (133-178)/(75-98) 178/98  Body mass index is 24.89 kg/m².    Physical Exam  HENT:      Head: Normocephalic.      Nose: Nose normal.      Mouth/Throat:      Mouth: Mucous membranes are moist.      Pharynx: Oropharynx is clear.   Eyes:      Extraocular Movements: Extraocular movements intact.      Conjunctiva/sclera: Conjunctivae normal.      Pupils: Pupils are equal, round, and reactive to light.   Cardiovascular:      Rate and Rhythm: Normal rate and regular rhythm.   Pulmonary:      Effort: Pulmonary effort is normal. No respiratory distress.      Breath sounds: Normal breath sounds.   Abdominal:      General: Bowel sounds are normal. There is no distension.      Palpations: Abdomen is soft.      Tenderness: There is no abdominal tenderness.   Musculoskeletal:          General: No swelling.   Neurological:      Mental Status: She is alert and oriented to person, place, and time. Mental status is at baseline.      Cranial Nerves: No cranial nerve deficit.      Motor: Weakness present.      Gait: Gait abnormal.       Disposition:        Discharge Medications        ASK your doctor about these medications        Instructions Start Date   acetaminophen 500 MG tablet  Commonly known as: TYLENOL   1,000 mg, Oral, Every 6 Hours PRN      amLODIPine 5 MG tablet  Commonly known as: NORVASC   5 mg, Oral, Daily      aspirin 81 MG tablet   81 mg, Oral, Daily      bisacodyl 5 MG EC tablet  Commonly known as: DULCOLAX   5 mg, Oral, Daily PRN      carvedilol 25 MG tablet  Commonly known as: COREG   25 mg, Oral, 2 Times Daily With Meals      cholecalciferol 25 MCG (1000 UT) tablet  Commonly known as: VITAMIN D3   1,000 Units, Oral, Daily      DULoxetine 30 MG capsule  Commonly known as: CYMBALTA   Take 30mg + 60mg 1 po cap daily      DULoxetine 60 MG capsule  Commonly known as: CYMBALTA   Take 60mg + 30mg cap 1 each together PO daily      lisinopril 40 MG tablet  Commonly known as: PRINIVIL,ZESTRIL   40 mg, Oral, Daily      multivitamin with minerals tablet tablet   1 tablet, Oral, Daily      rosuvastatin 20 MG tablet  Commonly known as: Crestor   20 mg, Oral, Nightly                 Future Appointments   Date Time Provider Department Center   6/20/2024 11:30 AM Mark Hernández DO [facility] Yang MD  [facility]  01/15/24    Discharge time spent greater than 30 minutes.

## 2024-01-16 VITALS
TEMPERATURE: 98.4 F | HEART RATE: 70 BPM | RESPIRATION RATE: 16 BRPM | BODY MASS INDEX: 24.75 KG/M2 | HEIGHT: 64 IN | SYSTOLIC BLOOD PRESSURE: 125 MMHG | OXYGEN SATURATION: 99 % | WEIGHT: 145 LBS | DIASTOLIC BLOOD PRESSURE: 66 MMHG

## 2024-01-16 PROCEDURE — 25010000002 ENOXAPARIN PER 10 MG: Performed by: INTERNAL MEDICINE

## 2024-01-16 RX ADMIN — AMLODIPINE BESYLATE 5 MG: 5 TABLET ORAL at 08:28

## 2024-01-16 RX ADMIN — ATENOLOL 25 MG: 25 TABLET ORAL at 08:28

## 2024-01-16 RX ADMIN — DULOXETINE HYDROCHLORIDE 90 MG: 60 CAPSULE, DELAYED RELEASE ORAL at 08:28

## 2024-01-16 RX ADMIN — Medication 10 ML: at 08:30

## 2024-01-16 RX ADMIN — LISINOPRIL 40 MG: 40 TABLET ORAL at 08:28

## 2024-01-16 RX ADMIN — LIDOCAINE 1 PATCH: 4 PATCH TOPICAL at 08:28

## 2024-01-16 RX ADMIN — ENOXAPARIN SODIUM 70 MG: 100 INJECTION SUBCUTANEOUS at 10:31

## 2024-01-16 NOTE — DISCHARGE SUMMARY
Patient Name: Gita Avelar  : 1945  MRN: [id number]    Date of Admission: 2024  Date of Discharge:  2024  Primary Care Physician: Mark Hernández DO      Chief Complaint:   Syncope and Vomiting      Discharge Diagnoses     Active Hospital Problems    Diagnosis  POA    **Syncope and collapse [R55]  Yes    Atrial fibrillation with RVR [I48.91]  Yes    Chest pain [R07.9]  Yes    Left knee pain [M25.562]  Yes    Neck pain on left side [M54.2]  Yes    Dysautonomia [G90.1]  Yes    Atherosclerosis of both carotid arteries [I65.23]  Yes    Essential hypertension [I10]  Yes    HLD (hyperlipidemia) [E78.5]  Yes      Resolved Hospital Problems   No resolved problems to display.        Hospital Course     Ms. Avelar is a 79 y.o. female who was admitted with chest pain and questionable syncope. Cardiology was consulted as pt was found to have Afib with RVR and elevated troponin. Started on therapeutic lovenox with plan to start apixaban at discharge per Cardiology recommendation. Echo showed normal EF but abnormal septal balance with RBBB. Further cardiac testing w/myocardial perfusion study was normal. Medications were adjusted as follows: carvedilol was changed to atenolol, amlodipine was added for better BP control. There was no significant stenosis of carotid arteries. Thyroid studies were unremarkable. She complained of neck pain that resolved; imaging showed multilevel degenerative changes. OT/PT evaluated recommending SNF. She will have ziopatch placed and discharge and follow up with Cardiology in 1 month. Stable from medical standpoint for discharge today.  Day of Discharge     Subjective:  Up in chair. Denies chest pain, soa. Feels tired but no other complaints. +BM 2 days ago which is normal for her. Agrees with discharge to rehab    Physical Exam:  Temp:  [98.5 °F (36.9 °C)-98.8 °F (37.1 °C)] 98.5 °F (36.9 °C)  Heart Rate:  [63-69] 69  Resp:  [16] 16  BP: (124-180)/() 124/82  Body  mass index is 24.89 kg/m².  Physical Exam  Vitals and nursing note reviewed.   Constitutional:       General: She is not in acute distress.     Appearance: She is not toxic-appearing.   HENT:      Head: Normocephalic.      Mouth/Throat:      Mouth: Mucous membranes are moist.   Eyes:      Conjunctiva/sclera: Conjunctivae normal.   Cardiovascular:      Rate and Rhythm: Normal rate and regular rhythm.   Pulmonary:      Effort: Pulmonary effort is normal. No respiratory distress.      Breath sounds: No wheezing or rales.   Abdominal:      General: Bowel sounds are normal.      Palpations: Abdomen is soft.   Musculoskeletal:      Cervical back: Neck supple.      Right lower leg: Edema present.      Left lower leg: Edema present.      Comments: Trace   Skin:     General: Skin is warm and dry.   Neurological:      Mental Status: She is alert and oriented to person, place, and time.   Psychiatric:         Mood and Affect: Mood normal.         Behavior: Behavior normal.         Consultants     Consult Orders (all) (From admission, onward)       Start     Ordered    01/12/24 0702  Inpatient Cardiology Consult  IN AM        Specialty:  Cardiology  Provider:  Jennifer Saavedra MD    01/12/24 0534    01/12/24 0512  [facility] (on-call MD unless specified) Details  Once        Specialty:  Hospitalist  Provider:  (Not yet assigned)    01/12/24 0511                  Procedures     * Surgery not found *    Imaging Results (All)       Procedure Component Value Units Date/Time    CT Cervical Spine Without Contrast [[id number]] Collected: 01/12/24 1910     Updated: 01/12/24 1920    Narrative:      CT EXAMINATION OF THE CERVICAL SPINE WITHOUT CONTRAST     HISTORY: Fall, neck pain.     COMPARISON: None.     FINDINGS: There is moderate to severe loss of disc height at C5-6 and  C6-7. There is no evidence or prevertebral edema or of a cervical  fracture.     C2-3: There is no evidence of disc bulge or herniation.     C3-4: A small central disc bulge  is present.     C4-5: Mild facet degenerative disease is present on the left.     C5-6: 2 mm of retrolisthesis of C5 on C6 is noted. A mild central  broad-based disc osteophyte complex is present with no evidence of  herniation. Mild to moderate foraminal stenosis is present on the right  secondary to loss of disc height and uncovertebral degenerative disease.     C6-7: Mild facet degenerative disease is present bilaterally.     C7-T1: There is no evidence of disc bulge or herniation.       Impression:      Multilevel degenerative disease involving cervical spine is  noted as described above including multilevel facet degenerative disease  and broad-based disc osteophyte complexes. Degenerative disease is most  prominent at C5-6 where there is moderate severe loss of disc height,  mild grade 1 retrolisthesis and mild to moderate foraminal stenosis on  the right. See above. Further evaluation could be performed with a MRI  examination of the cervical spine as indicated.           Radiation dose reduction techniques were utilized, including automated  exposure control and exposure modulation based on body size.        This report was finalized on 1/12/2024 7:17 PM by Dr. Ephraim Vega M.D  on Workstation: [id number]       XR Knee 1 or 2 View Left [[id number]] Collected: 01/12/24 1333     Updated: 01/12/24 1338    Narrative:      XR KNEE 1 OR 2 VW LEFT-     INDICATIONS: Trauma.     TECHNIQUE: 2 VIEWS OF THE LEFT KNEE     COMPARISON: None available     FINDINGS:     Mild medial tibiofemoral joint space narrowing is seen. No acute  fracture, erosion, or dislocation is identified. Minimal knee effusion  is apparent. Follow-up/further evaluation can be obtained as indications  persist.       Impression:         As described.           This report was finalized on 1/12/2024 1:34 PM by Dr. Olaf Cortez M.D on Workstation: [id number]       CT Head Without Contrast [[id number]] Collected: 01/12/24 0417     Updated: 01/12/24  0423    Narrative:      CT OF THE HEAD WITHOUT CONTRAST     HISTORY: Syncope and vomiting     COMPARISON: None available.     TECHNIQUE: Axial CT imaging was obtained through the brain. No IV  contrast was administered.     FINDINGS:  No acute intracranial hemorrhage is seen. There is diffuse atrophy.  There is periventricular and deep white matter microangiopathic change.  There is no midline shift or mass effect. 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 Additional old infarct is suspected within the anterior limb of  the right internal capsule. No calvarial fracture is seen. Paranasal  sinuses and mastoid air cells appear clear. There is a left parietal  scalp hematoma.       Impression:      No acute intracranial findings.     Radiation dose reduction techniques were utilized, including automated  exposure control and exposure modulation based on body size.        This report was finalized on 1/12/2024 4:20 AM by Dr. Sydni Adames M.D on Workstation: [id number]       XR Chest 1 View [[id number]] Collected: 01/12/24 0345     Updated: 01/12/24 0350    Narrative:      SINGLE VIEW OF THE CHEST     HISTORY: Chest heaviness     COMPARISON: February 5, 2023     FINDINGS:  There is cardiomegaly. There is tortuosity and ectasia of the thoracic  aorta. There is thoracolumbar scoliosis. No pneumothorax or pleural  effusion is seen. Nonspecific retrocardiac density is stable. It may  represent a hiatal hernia. No acute infiltrates are seen.       Impression:      No acute findings.     This report was finalized on 1/12/2024 3:47 AM by Dr. Sydni Adames M.D on Workstation: [id number]             Results for orders placed during the hospital encounter of 01/12/24    Duplex Carotid Ultrasound CAR    Interpretation Summary    Right internal carotid artery demonstrates a less than 50% stenosis.    Left internal carotid artery demonstrates a less than 50% stenosis.    Results for orders placed during the hospital  encounter of 01/12/24    Adult Transthoracic Echo Complete W/ Cont if Necessary Per Protocol    Interpretation Summary    Left ventricular systolic function is normal. Calculated left ventricular EF = 62.2%    Abnormal septal balance noted in the setting of bundle branch block.    RV normal in size and function.  Borderline RVH.    Estimated right ventricular systolic pressure from tricuspid regurgitation is normal (<35 mmHg).    No significant valvular abnormalities.    Normal biatrial and IVC size.    No prior study available for comparison.    Pertinent Labs     Results from last 7 days   Lab Units 01/13/24 0331 01/12/24 0605 01/12/24  0117   WBC 10*3/mm3 6.02 7.31 7.91   HEMOGLOBIN g/dL 12.8 12.9 13.7   PLATELETS 10*3/mm3 149 151 163     Results from last 7 days   Lab Units 01/14/24  0428 01/13/24 0331 01/12/24  0605 01/12/24  0117   SODIUM mmol/L 141 140 144 143   POTASSIUM mmol/L 4.2 3.4* 4.3 4.1   CHLORIDE mmol/L 108* 109* 113* 108*   CO2 mmol/L 21.9* 22.0 23.5 24.8   BUN mg/dL 12 11 16 20   CREATININE mg/dL 0.63 0.65 0.71 0.79   GLUCOSE mg/dL 73 81 100* 107*   EGFR mL/min/1.73 90.4 89.7 87.2 76.7     Results from last 7 days   Lab Units 01/12/24  0117   ALBUMIN g/dL 3.6   BILIRUBIN mg/dL 0.4   ALK PHOS U/L 73   AST (SGOT) U/L 15   ALT (SGPT) U/L 10     Results from last 7 days   Lab Units 01/14/24  0428 01/13/24  0331 01/12/24  0605 01/12/24  0117   CALCIUM mg/dL 8.6 8.4* 8.9 8.7   ALBUMIN g/dL  --   --   --  3.6   MAGNESIUM mg/dL 2.0  --   --  1.9       Results from last 7 days   Lab Units 01/12/24  1407 01/12/24  1155 01/12/24  0117   HSTROP T ng/L 22* 20* 19*       Results from last 7 days   Lab Units 01/12/24  0605   CHOLESTEROL mg/dL 133   TRIGLYCERIDES mg/dL 58   HDL CHOL mg/dL 39*   LDL CHOL mg/dL 82             Test Results Pending at Discharge       Discharge Details        Discharge Medications        New Medications        Instructions Start Date   apixaban 5 MG tablet tablet  Commonly known  as: ELIQUIS   5 mg, Oral, 2 Times Daily      atenolol 25 MG tablet  Commonly known as: TENORMIN   25 mg, Oral, 2 Times Daily             Changes to Medications        Instructions Start Date   acetaminophen 500 MG tablet  Commonly known as: TYLENOL  What changed: Another medication with the same name was added. Make sure you understand how and when to take each.   1,000 mg, Oral, Every 6 Hours PRN      acetaminophen 325 MG tablet  Commonly known as: TYLENOL  What changed: You were already taking a medication with the same name, and this prescription was added. Make sure you understand how and when to take each.   650 mg, Oral, Every 4 Hours PRN      DULoxetine 60 MG capsule  Commonly known as: CYMBALTA  What changed: Another medication with the same name was removed. Continue taking this medication, and follow the directions you see here.   Take 60mg + 30mg cap 1 each together PO daily             Continue These Medications        Instructions Start Date   amLODIPine 5 MG tablet  Commonly known as: NORVASC   5 mg, Oral, Daily      bisacodyl 5 MG EC tablet  Commonly known as: DULCOLAX   5 mg, Oral, Daily PRN      cholecalciferol 25 MCG (1000 UT) tablet  Commonly known as: VITAMIN D3   1,000 Units, Oral, Daily      lisinopril 40 MG tablet  Commonly known as: PRINIVIL,ZESTRIL   40 mg, Oral, Daily      multivitamin with minerals tablet tablet   1 tablet, Oral, Daily      rosuvastatin 20 MG tablet  Commonly known as: Crestor   20 mg, Oral, Nightly             Stop These Medications      aspirin 81 MG tablet     carvedilol 25 MG tablet  Commonly known as: COREG              No Known Allergies    Discharge Disposition:  Skilled Nursing Facility (DC - External)      Discharge Diet:  Diet Order   Procedures    Diet: Regular/House Diet; Texture: Regular Texture (IDDSI 7); Fluid Consistency: Thin (IDDSI 0)       Discharge Activity:   Activity Instructions       Activity as Tolerated     Additional Activity Instructions:     Advance as tolerated           CODE STATUS:    Code Status and Medical Interventions:   Ordered at: 01/12/24 1047     Medical Intervention Limits:    NO intubation (DNI)     Level Of Support Discussed With:    Patient     Code Status (Patient has no pulse and is not breathing):    No CPR (Do Not Attempt to Resuscitate)     Medical Interventions (Patient has pulse or is breathing):    Limited Support       Future Appointments   Date Time Provider Department Center   1/16/2024  1:00 PM [facility]   6/20/2024 11:30 AM Mark Hernández DO [facility]     Additional Instructions for the Follow-ups that You Need to Schedule       Discharge Follow-up with PCP   As directed       Currently Documented PCP:    Mark Hernández DO    PCP Phone Number:    [phone]     Follow Up Details: PCP post rehab.  Cardiology per their recommendations               Contact information for follow-up providers       Mark Hernández DO. Schedule an appointment as soon as possible for a visit in 1 week(s).    Specialties: Family Medicine, Urgent Care, Emergency Medicine  Why: follow up 1 week after discharge from rehab  Contact information:  [address]  [facility] [address]  [facility] [place] [id number] [phone]               Austyn Roberts MD. Schedule an appointment as soon as possible for a visit in 1 month(s).    Specialty: Cardiology  Contact information:  [address]  [facility] [address]  [place] [address]  [phone]                       Contact information for after-discharge care       Destination       [facility] .    Service: Skilled Nursing  Contact information:  [address] [facility] [address] [phone]                                   Additional Instructions for the Follow-ups that You Need to Schedule       Discharge Follow-up with PCP   As directed       Currently Documented PCP:    Mark Hernández DO    PCP Phone Number:    [phone]     Follow Up  Details: PCP post rehab.  Cardiology per their recommendations            Time Spent on Discharge:  Greater than 30 minutes      DARLENE Buckley  [facility]  01/16/24  11:52 EST

## 2024-01-16 NOTE — PLAN OF CARE
Goal Outcome Evaluation:  Plan of Care Reviewed With: patient        Progress: improving  Outcome Evaluation: Pt admitted with syncopal event, imaging studies completed, no acute cervical process noted. Stress test was also negative for acute findings. Pt does have some weakness which requires the attention of PT/OT, currently following. Plans for discharge to rehab with Zio patch when bed available. No complaints overnight, will continue to monitor

## 2024-01-16 NOTE — CASE MANAGEMENT/SOCIAL WORK
Case Management Discharge Note      Final Note: Pt discharged to [facility], 1/16/2024 via private auto……..Tish BELL/MARRY HARDING    Provided Post Acute Provider List?: Yes  Provided Post Acute Provider Quality & Resource List?: Yes  Post Acute Provider Quality and Resource List: Nursing Home  Delivered To: Patient  Method of Delivery: In person    Selected Continued Care - Admitted Since 1/12/2024       Destination Coordination complete.      Service Provider Selected Services Address Phone Fax Patient Preferred    Critical access hospital Skilled Nursing [address] [facility] [address] [phone] --              Durable Medical Equipment    No services have been selected for the patient.                Dialysis/Infusion    No services have been selected for the patient.                Home Medical Care    No services have been selected for the patient.                Therapy    No services have been selected for the patient.                Community Resources    No services have been selected for the patient.                Community & DME    No services have been selected for the patient.                    Transportation Services  Private: Car    Final Discharge Disposition Code: 03 - skilled nursing facility (SNF)

## 2024-01-16 NOTE — CASE MANAGEMENT/SOCIAL WORK
Continued Stay Note  [facility]     Patient Name: Gita Avelar  MRN: [id number]  Today's Date: 1/16/2024    Admit Date: 1/12/2024    Plan: [facility] SNF approved and bed available today.   Discharge Plan       Row Name 01/16/24 1050       Plan    Plan [facility] SNF approved and bed available today.    Plan Comments Per Janine Corley, Pt is approved and they have a bed today.  Pharmacy updated in true[x] Media today.  Packet on [facility] desk........................SRS/RNCM      Row Name 01/16/24 0942       Plan    Plan Comments Called and sent message to Jhonathan Corley to get an update on the rehab referral.  Awaiting a return call.                   Discharge Codes    No documentation.                 Expected Discharge Date and Time       Expected Discharge Date Expected Discharge Time    Jan 16, 2024               Tish Parada RN     MEDICINE

## 2024-01-20 ENCOUNTER — READMISSION MANAGEMENT (OUTPATIENT)
Dept: CALL CENTER | Facility: HOSPITAL | Age: 79
End: 2024-01-20
Payer: MEDICARE

## 2024-01-21 NOTE — OUTREACH NOTE
Prep Survey      Flowsheet Row Responses   [facility] facility patient discharged from? Non-BH   Is LACE score < 7 ? Non-BH Discharge   Eligibility [facility] - Skilled   Date of Discharge 01/20/24   Discharge diagnosis Streptococcal sepsis, unspecified   Does the patient have one of the following disease processes/diagnoses(primary or secondary)? Other   Prep survey completed? Yes            ASHTYN QUINTERO - Registered Nurse

## 2024-01-22 ENCOUNTER — TELEPHONE (OUTPATIENT)
Dept: PEDIATRICS | Facility: OTHER | Age: 79
End: 2024-01-22

## 2024-01-22 ENCOUNTER — TRANSITIONAL CARE MANAGEMENT TELEPHONE ENCOUNTER (OUTPATIENT)
Dept: CALL CENTER | Facility: HOSPITAL | Age: 79
End: 2024-01-22
Payer: MEDICARE

## 2024-01-22 DIAGNOSIS — I48.0 PAF (PAROXYSMAL ATRIAL FIBRILLATION): Primary | ICD-10-CM

## 2024-01-22 NOTE — OUTREACH NOTE
Call Center TCM Note      Flowsheet Row Responses   [facility] patient discharged from? Non-  [[facility]]   Does the patient have one of the following disease processes/diagnoses(primary or secondary)? Other   TCM attempt successful? Yes   Call start time 0932   Call end time 0939   Discharge diagnosis Streptococcal sepsis, unspecified   Medication comments Pt reports her Daughter takes care of her meds. Call placed to daughter to review meds and no answer.   Comments HOSP DC FU appt 1/25/24 330 pm.   Does the patient have an appointment with their PCP within 7-14 days of discharge? Yes   Has home health visited the patient within 72 hours of discharge? Unsure   Psychosocial issues? No   Did the patient receive a copy of their discharge instructions? Yes   Nursing interventions Reviewed instructions with patient   What is the patient's perception of their health status since discharge? Improving   Is the patient/caregiver able to teach back signs and symptoms related to disease process for when to call PCP? Yes   Is the patient/caregiver able to teach back signs and symptoms related to disease process for when to call 911? Yes   Is the patient/caregiver able to teach back the hierarchy of who to call/visit for symptoms/problems? PCP, Specialist, Home health nurse, Urgent Care, ED, 911 Yes   TCM call completed? Yes   Wrap up additional comments Pt reports that she is doing better. She states her daughter fills her meds in pill box and she takes. Attempted to reach daughter to review meds and no answer.   Call end time 0939            Cheyenne Villalobos RN    1/22/2024, 09:39 EST

## 2024-01-22 NOTE — TELEPHONE ENCOUNTER
Caller: Gita Avelar    Relationship: Self    Best call back number [phone]  What is the best time to reach you: ANY     Who are you requesting to speak with (clinical staff, provider,  specific staff member): CLINICAL STAFF     What was the call regarding: PATIENT IS CALLING IN ASKING FOR A CALL BACK.  SHE WAS IN THE HOSPITAL NOT LONG AGO AND THEY PLACED HER ON apixaban (ELIQUIS) 5 MG tablet tablet .  IT IS GOING TO RUN HER OVER 500.00 A MONTH FOR 30 DAY SUPPLY .  SHE IS WANTING TO KNOW IF DR GUZMÁN CAN PRESCRIBE SOMETHING EQUAL TO THIS THAT WILL BE MORE COST EFFECTIVE FOR THE PATIENT.

## 2024-01-23 ENCOUNTER — PATIENT OUTREACH (OUTPATIENT)
Dept: CASE MANAGEMENT | Facility: OTHER | Age: 79
End: 2024-01-23
Payer: MEDICARE

## 2024-01-23 NOTE — OUTREACH NOTE
"AMBULATORY CASE MANAGEMENT NOTE    Name and Relationship of Patient/Support Person: Gita Avelar S - Self    Patient Outreach    Outreach to pt in follow up to discharge from SNF after a 4 day stay as she did not find it beneficial. Pt reports to be doing well except for dizziness that she has had \"for the past several months\". Denies chest pain, sob, syncope. Uses walker. States she has follow up with pcp 1/25/24 and cardio 2/16/24 but she will have to find a ride. Stated she no longer drives and her son and DIL usually provide her transportation but they work. She has not picked up her hospital dc prescriptions from pharmacy yet. Eliquis too expensive, she says it costs over $500 for 30 day supply. PCP had sent in for xarelto instead to see if cheaper.    Care Coordination    Call placed to [facility] pharmacy to check cost of xarelto, $85.35. Atenolol $2.    Patient Outreach    Called pt back and she said that price is more affordable for her and she will have her DIL go and pick it up. Reminded to also  atenolol rx and stop her coreg as instructed per hospital AVS. No other concerns or needs reported at this time.     Cheri BERNAL  Ambulatory Case Management    1/23/2024, 13:36 EST  "

## 2024-01-25 ENCOUNTER — TELEPHONE (OUTPATIENT)
Dept: FAMILY MEDICINE CLINIC | Facility: CLINIC | Age: 79
End: 2024-01-25

## 2024-01-25 NOTE — TELEPHONE ENCOUNTER
"  Caller: Gita Avelar    Relationship: Self    Best call back number: [phone]    Which medication are you concerned about: CARVIDILOL & ATENOLOL    Who prescribed you this medication: DR. GUZMÁN AND HOSPITAL      What are your concerns: SHE WOULD LIKE TO KNOW IF SHE SHOULD TAKE BOTH OF THESE OR JUST ONE AND WHICH ONE\"?  PLEASE CALL AND ADVISE.  SHE WOULD ALSO LIKE SOME MECLIZINE CALLED IN AS SHE IS REALLY DIZZY FEELING.     How long have you had these concerns: 1/25/24        "

## 2024-01-26 ENCOUNTER — PATIENT OUTREACH (OUTPATIENT)
Dept: CASE MANAGEMENT | Facility: OTHER | Age: 79
End: 2024-01-26
Payer: MEDICARE

## 2024-01-26 NOTE — OUTREACH NOTE
AMBULATORY CASE MANAGEMENT NOTE    Name and Relationship of Patient/Support Person: Gita Avelar S - Self    Patient Outreach    Call placed to pt to ensure she was able to get her xarelto and atenolol filled and stop coreg. Pt got them filled and verbalized understanding to stop coreg and start atenolol. Her DIL organizes her pill box each week and she has written a note for her to stop coreg. Pt canceled her pcp hospital f/u appt. Said she couldn't get a ride and she did not have the strength to get dressed and ready. She also reported to be scared of falling. Discussed importance of follow up. Discussed HH PT, pt declined. Discussed Whe[employer] transportation, pt agreeable to sign up. Pt reported difficulty making meals d/t dizziness and back pain that prevent her from standing for too long. Stated she eats mostly frozen dinners and canned foods and her DIL goes grocery shopping for her. Discussed meals on wheels, pt said she is a picky eater but ok to try.    Care Coordination    Called [facility] [phone], provided pt's name, , and phone number and they said they will call her sometime next week to go over the application process.    Care Coordination    [facility]ed [facility] [phone] for meals on wheels, provided pt's name, , address, and phone number. They said one of their dieticians will call her to do an assessment over the phone in the next 4 weeks.    Cheri BERNAL  Ambulatory Case Management    2024, 12:50 EST

## 2024-01-31 ENCOUNTER — OFFICE VISIT (OUTPATIENT)
Dept: FAMILY MEDICINE CLINIC | Facility: CLINIC | Age: 79
End: 2024-01-31
Payer: MEDICARE

## 2024-01-31 VITALS
BODY MASS INDEX: 24.72 KG/M2 | OXYGEN SATURATION: 96 % | WEIGHT: 144.8 LBS | HEIGHT: 64 IN | DIASTOLIC BLOOD PRESSURE: 74 MMHG | SYSTOLIC BLOOD PRESSURE: 130 MMHG | HEART RATE: 65 BPM

## 2024-01-31 DIAGNOSIS — R42 VERTIGO: Primary | ICD-10-CM

## 2024-01-31 PROCEDURE — 3075F SYST BP GE 130 - 139MM HG: CPT | Performed by: NURSE PRACTITIONER

## 2024-01-31 PROCEDURE — 3078F DIAST BP <80 MM HG: CPT | Performed by: NURSE PRACTITIONER

## 2024-01-31 PROCEDURE — 99213 OFFICE O/P EST LOW 20 MIN: CPT | Performed by: NURSE PRACTITIONER

## 2024-01-31 RX ORDER — MECLIZINE HYDROCHLORIDE 25 MG/1
25 TABLET ORAL 3 TIMES DAILY PRN
Status: DISCONTINUED | OUTPATIENT
Start: 2024-01-31 | End: 2024-02-02

## 2024-01-31 NOTE — PROGRESS NOTES
Subjective   Gita Avelar is a 79 y.o. female. Presents today for   Chief Complaint   Patient presents with    Hospital Follow Up Visit     Fall  Hit head     Atrial Fib        Elevated Blood Pressure      BHE   then rehab    Dizziness     Asked about Meclizine       History Of Present Illness  She took a fall and was hospitalized, found to be in a-fib, elevated blood pressure, She has been experiencing dizziness and would like some meclizine to help her symptoms.      Patient Active Problem List   Diagnosis    Avitaminosis D    B12 deficiency    Near syncope    Atrophic vaginitis    Awareness of heartbeats    OP (osteoporosis)    HLD (hyperlipidemia)    Fatigue    Essential hypertension    Moderate episode of recurrent major depressive disorder    Nipple discharge    Abnormal ultrasound of breast    Abnormal mammogram    Lump or mass in breast    Family history of malignant neoplasm of pancreas    Atherosclerosis of both carotid arteries    Acute UTI    COVID-19 virus infection    Hypoxia    Streptococcal septicemia    Generalized weakness    Dizziness on standing    History of Guillain-Geneva syndrome    History of subdural hematoma    Dysautonomia    Cytokine release syndrome, grade 1    Syncope and collapse    Atrial fibrillation with RVR    Chest pain    Left knee pain    Neck pain on left side       Social History     Socioeconomic History    Marital status:    Tobacco Use    Smoking status: Former     Packs/day: 0.50     Years: 25.00     Additional pack years: 0.00     Total pack years: 12.50     Types: Cigarettes     Start date:      Quit date: 2021     Years since quittin.5    Smokeless tobacco: Never   Vaping Use    Vaping Use: Never used   Substance and Sexual Activity    Alcohol use: Yes     Comment: occasionally     Drug use: No    Sexual activity: Not Currently     Partners: Male       No Known Allergies    Current Outpatient Medications on File Prior to Visit   Medication Sig  "Dispense Refill    acetaminophen (TYLENOL) 325 MG tablet Take 2 tablets by mouth Every 4 (Four) Hours As Needed for Mild Pain.      acetaminophen (TYLENOL) 500 MG tablet Take 2 tablets by mouth Every 6 (Six) Hours As Needed for Mild Pain. Indications: Pain      amLODIPine (NORVASC) 5 MG tablet Take 1 tablet by mouth Daily. 30 tablet 5    atenolol (TENORMIN) 25 MG tablet Take 1 tablet by mouth 2 (Two) Times a Day.      bisacodyl (DULCOLAX) 5 MG EC tablet Take 1 tablet by mouth Daily As Needed for Constipation. Indications: Constipation      DULoxetine (CYMBALTA) 60 MG capsule Take 60mg + 30mg cap 1 each together PO daily 90 capsule 1    lisinopril (PRINIVIL,ZESTRIL) 40 MG tablet Take 1 tablet by mouth Daily. Indications: High Blood Pressure Disorder 90 tablet 3    Multiple Vitamins-Minerals (PRESERVISION AREDS PO) Take 1 tablet by mouth Daily. Indications: Vitamin Deficiency      rivaroxaban (Xarelto) 20 MG tablet Take 1 tablet by mouth Daily. 30 tablet 5    rosuvastatin (Crestor) 20 MG tablet Take 1 tablet by mouth Every Night. Indications: High Amount of Fats in the Blood 90 tablet 3    [DISCONTINUED] cholecalciferol (VITAMIN D3) 1000 UNITS tablet Take 1 tablet by mouth Daily. Indications: Vitamin D Deficiency (Patient not taking: Reported on 1/31/2024)       No current facility-administered medications on file prior to visit.       Objective   Vitals:    01/31/24 1502   BP: 130/74   Pulse: 65   SpO2: 96%   Weight: 65.7 kg (144 lb 12.8 oz)   Height: 162.6 cm (64\")     Body mass index is 24.85 kg/m².    Physical Exam    Procedures     Assessment & Plan   Diagnoses and all orders for this visit:    1. Vertigo (Primary)  -     meclizine (ANTIVERT) tablet 25 mg         Discussed Care Gaps, ordered referrals and encouraged vaccination updates.       - Pt agrees with plan of care and denies further questions/concerns today  - This document is intended for medical expert use only. Persons  reading this document without " medical staff guidance may result in misinterpretation and unintended morbidity     Go to the ER for any possible life-threatening symptoms such as chest pain or shortness of air.      Please allow 3-5 business days for recommendations based on new results      I personally spent time with this patient, preparing for the visit, reviewing tests, obtaining and/or reviewing a separately obtained history, performing a medically appropriate examination and/or evaluation, counseling and educating the patient/family/caregiver, ordering medications,  documenting information in the medical record and indepentently interpreting results.         BMI is within normal parameters. No other follow-up for BMI required.     Return in about 5 months (around 6/20/2024) for Next scheduled follow up.

## 2024-02-02 RX ORDER — MECLIZINE HYDROCHLORIDE 25 MG/1
25 TABLET ORAL 3 TIMES DAILY PRN
Qty: 45 TABLET | Refills: 1 | Status: SHIPPED | OUTPATIENT
Start: 2024-02-02

## 2024-02-09 ENCOUNTER — PATIENT OUTREACH (OUTPATIENT)
Dept: CASE MANAGEMENT | Facility: OTHER | Age: 79
End: 2024-02-09
Payer: MEDICARE

## 2024-02-09 NOTE — OUTREACH NOTE
AMBULATORY CASE MANAGEMENT NOTE    Name and Relationship of Patient/Support Person: Gita Avelar S - Self    Patient Outreach    Pt reports she got set up with [employer] and will be able to start using next month. Reminded of her cardio appt 2/16/24 and she will arrange transportation with her son. Pt continues to c/o dizziness. PCP recently prescribed her meclizine but pt states it does not help and seems to make her feel worse. She also voiced concern about taking xarelto. Discussed importance of xarelto and encd her to continue to take and discuss with cardio.    Cheri BERNAL  Ambulatory Case Management    2/9/2024, 10:30 EST

## 2024-02-16 ENCOUNTER — OFFICE VISIT (OUTPATIENT)
Dept: CARDIOLOGY | Facility: CLINIC | Age: 79
End: 2024-02-16
Payer: MEDICARE

## 2024-02-16 ENCOUNTER — LAB (OUTPATIENT)
Dept: LAB | Facility: HOSPITAL | Age: 79
End: 2024-02-16
Payer: MEDICARE

## 2024-02-16 VITALS
HEIGHT: 64 IN | DIASTOLIC BLOOD PRESSURE: 100 MMHG | HEART RATE: 68 BPM | WEIGHT: 145 LBS | OXYGEN SATURATION: 99 % | SYSTOLIC BLOOD PRESSURE: 150 MMHG | BODY MASS INDEX: 24.75 KG/M2

## 2024-02-16 DIAGNOSIS — E55.9 VITAMIN D DEFICIENCY: ICD-10-CM

## 2024-02-16 DIAGNOSIS — D50.8 OTHER IRON DEFICIENCY ANEMIA: ICD-10-CM

## 2024-02-16 DIAGNOSIS — I95.1 ORTHOSTATIC HYPOTENSION: ICD-10-CM

## 2024-02-16 DIAGNOSIS — R29.898 LEG WEAKNESS, BILATERAL: ICD-10-CM

## 2024-02-16 DIAGNOSIS — I48.0 PAROXYSMAL ATRIAL FIBRILLATION: ICD-10-CM

## 2024-02-16 DIAGNOSIS — I95.1 ORTHOSTATIC HYPOTENSION: Primary | ICD-10-CM

## 2024-02-16 LAB
25(OH)D3 SERPL-MCNC: 28.7 NG/ML (ref 30–100)
ALBUMIN SERPL-MCNC: 4 G/DL (ref 3.5–5.2)
ALBUMIN/GLOB SERPL: 1.5 G/DL
ALP SERPL-CCNC: 77 U/L (ref 39–117)
ALT SERPL W P-5'-P-CCNC: 11 U/L (ref 1–33)
ANION GAP SERPL CALCULATED.3IONS-SCNC: 10.3 MMOL/L (ref 5–15)
AST SERPL-CCNC: 16 U/L (ref 1–32)
BILIRUB SERPL-MCNC: 0.4 MG/DL (ref 0–1.2)
BUN SERPL-MCNC: 17 MG/DL (ref 8–23)
BUN/CREAT SERPL: 21 (ref 7–25)
CALCIUM SPEC-SCNC: 9.3 MG/DL (ref 8.6–10.5)
CHLORIDE SERPL-SCNC: 107 MMOL/L (ref 98–107)
CO2 SERPL-SCNC: 24.7 MMOL/L (ref 22–29)
CREAT SERPL-MCNC: 0.81 MG/DL (ref 0.57–1)
EGFRCR SERPLBLD CKD-EPI 2021: 73.9 ML/MIN/1.73
GLOBULIN UR ELPH-MCNC: 2.7 GM/DL
GLUCOSE SERPL-MCNC: 100 MG/DL (ref 65–99)
HCT VFR BLD AUTO: 42.8 % (ref 34–46.6)
HGB BLD-MCNC: 13.9 G/DL (ref 12–15.9)
POTASSIUM SERPL-SCNC: 3.9 MMOL/L (ref 3.5–5.2)
PROT SERPL-MCNC: 6.7 G/DL (ref 6–8.5)
SODIUM SERPL-SCNC: 142 MMOL/L (ref 136–145)

## 2024-02-16 PROCEDURE — 36415 COLL VENOUS BLD VENIPUNCTURE: CPT

## 2024-02-16 PROCEDURE — 85014 HEMATOCRIT: CPT

## 2024-02-16 PROCEDURE — 85018 HEMOGLOBIN: CPT

## 2024-02-16 PROCEDURE — 80053 COMPREHEN METABOLIC PANEL: CPT

## 2024-02-16 PROCEDURE — 82306 VITAMIN D 25 HYDROXY: CPT

## 2024-02-18 NOTE — PROGRESS NOTES
Please notify Ms. Avelar that most recent blood work is overall normal without any concerning abnormality.  Limit if she has any further questions.  Thank you

## 2024-02-19 ENCOUNTER — PATIENT OUTREACH (OUTPATIENT)
Dept: CASE MANAGEMENT | Facility: OTHER | Age: 79
End: 2024-02-19
Payer: MEDICARE

## 2024-02-19 NOTE — OUTREACH NOTE
AMBULATORY CASE MANAGEMENT NOTE    Name and Relationship of Patient/Support Person: Gita Avelar S - Self    Patient Outreach    Outreach to pt after cardio appt. Notes reviewed and pt reports compliance with recommendations. HH PT/OT ordered. Prescribed compression stockings. Scheduled for holter placement on 3/1/24 to monitor for afib recurrence. No longer taking xarelto d/t GI side effects. Started on baby aspirin.     Adult Patient Profile  Questions/Answers      Flowsheet Row Most Recent Value   Symptoms/Conditions Managed at Home cardiovascular, musculoskeletal   Cardiovascular Symptoms/Conditions hypertension, dysrhythmia  [new diagnosis afib]   Cardiovascular Management Strategies medication therapy   Musculoskeletal Symptoms/Conditions unsteady gait  [high risk for falls]   Musculoskeletal Management Strategies medical device  [uses walker]   Barriers to Taking Medication as Prescribed none  [DIL organizes her pill box each week]   Advance Directive Status Patient has advance directive, copy in chart   People in Home alone   Family Caregiver if Needed child(dominique), adult   Current Living Arrangements apartment          Cheri BERNAL  Ambulatory Case Management    2/19/2024, 16:44 EST

## 2024-02-19 NOTE — PROGRESS NOTES
Reviewed results with Gita Avelar and patient verbalized understanding of results.    Thank you,  Leah GANT RN  Triage Nurse [facility] [place]    14:28 EST

## 2024-02-26 ENCOUNTER — HOME HEALTH ADMISSION (OUTPATIENT)
Dept: HOME HEALTH SERVICES | Facility: HOME HEALTHCARE | Age: 79
End: 2024-02-26
Payer: MEDICARE

## 2024-05-03 ENCOUNTER — OFFICE VISIT (OUTPATIENT)
Dept: FAMILY MEDICINE CLINIC | Facility: CLINIC | Age: 79
End: 2024-05-03
Payer: MEDICARE

## 2024-05-03 VITALS
SYSTOLIC BLOOD PRESSURE: 122 MMHG | BODY MASS INDEX: 24.62 KG/M2 | HEART RATE: 67 BPM | WEIGHT: 144.2 LBS | TEMPERATURE: 97.9 F | DIASTOLIC BLOOD PRESSURE: 78 MMHG | OXYGEN SATURATION: 95 % | HEIGHT: 64 IN

## 2024-05-03 DIAGNOSIS — J30.2 SEASONAL ALLERGIC RHINITIS, UNSPECIFIED TRIGGER: Primary | ICD-10-CM

## 2024-05-03 DIAGNOSIS — G89.29 CHRONIC SI JOINT PAIN: ICD-10-CM

## 2024-05-03 DIAGNOSIS — R06.2 WHEEZING: ICD-10-CM

## 2024-05-03 DIAGNOSIS — M53.3 CHRONIC SI JOINT PAIN: ICD-10-CM

## 2024-05-03 PROBLEM — U07.1 COVID-19 VIRUS INFECTION: Status: RESOLVED | Noted: 2023-01-29 | Resolved: 2024-05-03

## 2024-05-03 PROBLEM — N39.0 ACUTE UTI: Status: RESOLVED | Noted: 2023-01-29 | Resolved: 2024-05-03

## 2024-05-03 PROBLEM — A40.9 STREPTOCOCCAL SEPTICEMIA: Status: RESOLVED | Noted: 2023-01-31 | Resolved: 2024-05-03

## 2024-05-03 PROCEDURE — 1159F MED LIST DOCD IN RCRD: CPT

## 2024-05-03 PROCEDURE — 3078F DIAST BP <80 MM HG: CPT

## 2024-05-03 PROCEDURE — 1160F RVW MEDS BY RX/DR IN RCRD: CPT

## 2024-05-03 PROCEDURE — 3074F SYST BP LT 130 MM HG: CPT

## 2024-05-03 PROCEDURE — 99213 OFFICE O/P EST LOW 20 MIN: CPT

## 2024-05-03 RX ORDER — ALBUTEROL SULFATE 90 UG/1
2 AEROSOL, METERED RESPIRATORY (INHALATION) EVERY 4 HOURS PRN
Qty: 18 G | Refills: 12 | Status: SHIPPED | OUTPATIENT
Start: 2024-05-03

## 2024-05-03 NOTE — PATIENT INSTRUCTIONS
Steps to Quit Smoking  Smoking tobacco is the leading cause of preventable death. It can affect almost every organ in the body. Smoking puts you and people around you at risk for many serious, long-lasting (chronic) diseases. Quitting smoking can be hard, but it is one of the best things that you can do for your health. It is never too late to quit.  Do not give up if you cannot quit the first time. Some people need to try many times to quit. Do your best to stick to your quit plan, and talk with your doctor if you have any questions or concerns.  How do I get ready to quit?  Pick a date to quit. Set a date within the next 2 weeks to give you time to prepare.  Write down the reasons why you are quitting. Keep this list in places where you will see it often.  Tell your family, friends, and co-workers that you are quitting. Their support is important.  Talk with your doctor about the choices that may help you quit.  Find out if your health insurance will pay for these treatments.  Know the people, places, things, and activities that make you want to smoke (triggers). Avoid them.  What first steps can I take to quit smoking?  Throw away all cigarettes at home, at work, and in your car.  Throw away the things that you use when you smoke, such as ashtrays and lighters.  Clean your car. Empty the ashtray.  Clean your home, including curtains and carpets.  What can I do to help me quit smoking?  Talk with your doctor about taking medicines and seeing a counselor. You are more likely to succeed when you do both.  If you are pregnant or breastfeeding:  Talk with your doctor about counseling or other ways to quit smoking.  Do not take medicine to help you quit smoking unless your doctor tells you to.  Quit right away  Quit smoking completely, instead of slowly cutting back on how much you smoke over a period of time. Stopping smoking right away may be more successful than slowly quitting.  Go to counseling. In-person is best  if this is an option. You are more likely to quit if you go to counseling sessions regularly.  Take medicine  You may take medicines to help you quit. Some medicines need a prescription, and some you can buy over-the-counter. Some medicines may contain a drug called nicotine to replace the nicotine in cigarettes. Medicines may:  Help you stop having the desire to smoke (cravings).  Help to stop the problems that come when you stop smoking (withdrawal symptoms).  Your doctor may ask you to use:  Nicotine patches, gum, or lozenges.  Nicotine inhalers or sprays.  Non-nicotine medicine that you take by mouth.  Find resources  Find resources and other ways to help you quit smoking and remain smoke-free after you quit. They include:  Online chats with a counselor.  Phone quitlines.  Printed self-help materials.  Support groups or group counseling.  Text messaging programs.  Mobile phone apps. Use apps on your mobile phone or tablet that can help you stick to your quit plan. Examples of free services include Quit Guide from the CDC and smokefree.gov    What can I do to make it easier to quit?    Talk to your family and friends. Ask them to support and encourage you.  Call a phone quitline, such as 800-QUIT-NOW, reach out to support groups, or work with a counselor.  Ask people who smoke to not smoke around you.  Avoid places that make you want to smoke, such as:  Bars.  Parties.  Smoke-break areas at work.  Spend time with people who do not smoke.  Lower the stress in your life. Stress can make you want to smoke. Try these things to lower stress:  Getting regular exercise.  Doing deep-breathing exercises.  Doing yoga.  Meditating.  What benefits will I see if I quit smoking?  Over time, you may have:  A better sense of smell and taste.  Less coughing and sore throat.  A slower heart rate.  Lower blood pressure.  Clearer skin.  Better breathing.  Fewer sick days.  Summary  Quitting smoking can be hard, but it is one of  the best things that you can do for your health.  Do not give up if you cannot quit the first time. Some people need to try many times to quit.  When you decide to quit smoking, make a plan to help you succeed.  Quit smoking right away, not slowly over a period of time.  When you start quitting, get help and support to keep you smoke-free.  This information is not intended to replace advice given to you by your health care provider. Make sure you discuss any questions you have with your health care provider.  Document Revised: 12/09/2022 Document Reviewed: 12/09/2022  Elsevier Patient Education © 2023 [employer]

## 2024-05-03 NOTE — PROGRESS NOTES
Subjective   Gita Avelar is a 79 y.o. female.     Chief Complaint   Patient presents with    Cough     With chest congestion x 2 days     History of Present Illness  Pt smoker x20 years, smokes 6-7 cigarettes/day. Suspect underlying COPD.  Cough  This is a new problem. Episode onset: x2 days ago. The problem has been improved. The problem occurs every few minutes. The cough is Productive of clear sputum. Associated symptoms include rhinorrhea and wheezing. Pertinent negatives include no chills, ear congestion, ear pain, fever or nasal congestion. The symptoms are aggravated by exercise. Risk factors for lung disease include smoking/tobacco exposure. Treatments tried: Mucinex + cough medicine. The treatment provided mild relief. Her past medical history is significant for environmental allergies.      Chronic SI joint pain: Pt c/o chronic SI joint pain. She takes Tylenol arthritis bid, or Advil once/day. Will do aspercreme w/ lidocaine, and lidocaine patches. States she has done rehab in the past.     The following portions of the patient's history were reviewed and updated as appropriate: allergies, current medications, past family history, past medical history, past social history, past surgical history and problem list.    Review of Systems   Denies dizziness, fatigue, fever/chills, CP, SOA, headache, blood in urine/stool, abd pain, leg swelling.  Positive for cough, SOA on exertion, SI joint pain.     Objective     Vitals:    05/03/24 1118   BP: 122/78   Pulse: 67   Temp: 97.9 °F (36.6 °C)   SpO2: 95%      Body mass index is 24.75 kg/m².    Physical Exam  Vitals reviewed.   Constitutional:       General: She is not in acute distress.     Appearance: Normal appearance. She is not ill-appearing or toxic-appearing.   HENT:      Right Ear: Tympanic membrane, ear canal and external ear normal.      Left Ear: Tympanic membrane, ear canal and external ear normal.      Nose: No congestion or rhinorrhea.       Mouth/Throat:      Mouth: Mucous membranes are moist.      Pharynx: Oropharynx is clear. No oropharyngeal exudate or posterior oropharyngeal erythema.   Eyes:      Conjunctiva/sclera: Conjunctivae normal.   Cardiovascular:      Rate and Rhythm: Normal rate.      Pulses: Normal pulses.      Heart sounds: Normal heart sounds.   Pulmonary:      Effort: Pulmonary effort is normal. No respiratory distress.      Breath sounds: Examination of the right-upper field reveals wheezing. Wheezing present.   Abdominal:      General: Bowel sounds are normal.      Palpations: Abdomen is soft.      Tenderness: There is no abdominal tenderness.   Skin:     General: Skin is warm and dry.      Capillary Refill: Capillary refill takes less than 2 seconds.   Neurological:      General: No focal deficit present.      Mental Status: She is alert and oriented to person, place, and time.      Motor: No weakness.   Psychiatric:         Behavior: Behavior normal.       Assessment & Plan   Diagnoses and all orders for this visit:    1. Seasonal allergic rhinitis, unspecified trigger (Primary)    2. Wheezing  -     albuterol sulfate  (90 Base) MCG/ACT inhaler; Inhale 2 puffs Every 4 (Four) Hours As Needed for Wheezing.  Dispense: 18 g; Refill: 12    3. Chronic SI joint pain  -     diclofenac (VOLTAREN) 50 MG EC tablet; Take 1 tablet by mouth Daily.  Dispense: 60 tablet; Refill: 0      Plan:     Take OTC medications as needed for symptom management. Rest, increase water intake.   Take prescribed medications per instructions.   Trelegy ellipta and breztri samples to try.  Follow up in 1 months for scheduled f/u or sooner as needed    Discussed Care Gaps, ordered referrals and encouraged vaccination updates.       - Pt agrees with plan of care and denies further questions/concerns today  - This document is intended for medical expert use only. Persons  reading this document without medical staff guidance may result in misinterpretation and  unintended morbidity     Go to the ER for any possible life-threatening symptoms such as chest pain or shortness of air.      Please allow 3-5 business days for recommendations based on new results      I personally spent time with this patient, preparing for the visit, reviewing tests, obtaining and/or reviewing a separately obtained history, performing a medically appropriate examination and/or evaluation, counseling and educating the patient/family/caregiver, ordering medications,  documenting information in the medical record and indepentently interpreting results.

## 2024-06-20 ENCOUNTER — OFFICE VISIT (OUTPATIENT)
Dept: FAMILY MEDICINE CLINIC | Facility: CLINIC | Age: 79
End: 2024-06-20
Payer: MEDICARE

## 2024-06-20 VITALS
DIASTOLIC BLOOD PRESSURE: 86 MMHG | HEART RATE: 66 BPM | OXYGEN SATURATION: 94 % | HEIGHT: 64 IN | WEIGHT: 144 LBS | SYSTOLIC BLOOD PRESSURE: 120 MMHG | BODY MASS INDEX: 24.59 KG/M2

## 2024-06-20 DIAGNOSIS — I48.0 PAF (PAROXYSMAL ATRIAL FIBRILLATION): ICD-10-CM

## 2024-06-20 DIAGNOSIS — N39.0 ACUTE UTI: ICD-10-CM

## 2024-06-20 DIAGNOSIS — Z00.00 MEDICARE ANNUAL WELLNESS VISIT, SUBSEQUENT: Primary | ICD-10-CM

## 2024-06-20 DIAGNOSIS — E55.9 VITAMIN D DEFICIENCY: ICD-10-CM

## 2024-06-20 DIAGNOSIS — I10 PRIMARY HYPERTENSION: ICD-10-CM

## 2024-06-20 DIAGNOSIS — R31.0 GROSS HEMATURIA: ICD-10-CM

## 2024-06-20 DIAGNOSIS — E78.5 DYSLIPIDEMIA: ICD-10-CM

## 2024-06-20 PROCEDURE — G0439 PPPS, SUBSEQ VISIT: HCPCS | Performed by: FAMILY MEDICINE

## 2024-06-20 PROCEDURE — 1125F AMNT PAIN NOTED PAIN PRSNT: CPT | Performed by: FAMILY MEDICINE

## 2024-06-20 PROCEDURE — 3074F SYST BP LT 130 MM HG: CPT | Performed by: FAMILY MEDICINE

## 2024-06-20 PROCEDURE — 1160F RVW MEDS BY RX/DR IN RCRD: CPT | Performed by: FAMILY MEDICINE

## 2024-06-20 PROCEDURE — 1159F MED LIST DOCD IN RCRD: CPT | Performed by: FAMILY MEDICINE

## 2024-06-20 PROCEDURE — 1170F FXNL STATUS ASSESSED: CPT | Performed by: FAMILY MEDICINE

## 2024-06-20 PROCEDURE — 99214 OFFICE O/P EST MOD 30 MIN: CPT | Performed by: FAMILY MEDICINE

## 2024-06-20 PROCEDURE — 3079F DIAST BP 80-89 MM HG: CPT | Performed by: FAMILY MEDICINE

## 2024-06-20 RX ORDER — LISINOPRIL 40 MG/1
40 TABLET ORAL DAILY
Qty: 90 TABLET | Refills: 3 | Status: SHIPPED | OUTPATIENT
Start: 2024-06-20

## 2024-06-20 RX ORDER — CEPHALEXIN 500 MG/1
500 CAPSULE ORAL 2 TIMES DAILY
Qty: 14 CAPSULE | Refills: 0 | Status: SHIPPED | OUTPATIENT
Start: 2024-06-20

## 2024-06-20 RX ORDER — CARVEDILOL 25 MG/1
25 TABLET ORAL 2 TIMES DAILY WITH MEALS
COMMUNITY
Start: 2024-03-05

## 2024-06-20 NOTE — PATIENT INSTRUCTIONS
Advance Care Planning and Advance Directives     You make decisions on a daily basis - decisions about where you want to live, your career, your home, your life. Perhaps one of the most important decisions you face is your choice for future medical care. Take time to talk with your family and your healthcare team and start planning today.  Advance Care Planning is a process that can help you:  Understand possible future healthcare decisions in light of your own experiences  Reflect on those decision in light of your goals and values  Discuss your decisions with those closest to you and the healthcare professionals that care for you  Make a plan by creating a document that reflects your wishes    Surrogate Decision Maker  In the event of a medical emergency, which has left you unable to communicate or to make your own decisions, you would need someone to make decisions for you.  It is important to discuss your preferences for medical treatment with this person while you are in good health.     Qualities of a surrogate decision maker:  Willing to take on this role and responsibility  Knows what you want for future medical care  Willing to follow your wishes even if they don't agree with them  Able to make difficult medical decisions under stressful circumstances    Advance Directives  These are legal documents you can create that will guide your healthcare team and decision maker(s) when needed. These documents can be stored in the electronic medical record.    Living Will - a legal document to guide your care if you have a terminal condition or a serious illness and are unable to communicate. States vary by statute in document names/types, but most forms may include one or more of the following:        -  Directions regarding life-prolonging treatments        -  Directions regarding artificially provided nutrition/hydration        -  Choosing a healthcare decision maker        -  Direction regarding organ/tissue  donation    Durable Power of  for Healthcare - this document names an -in-fact to make medical decisions for you, but it may also allow this person to make personal and financial decisions for you. Please seek the advice of an  if you need this type of document.    **Advance Directives are not required and no one may discriminate against you if you do not sign one.    Medical Orders  Many states allow specific forms/orders signed by your physician to record your wishes for medical treatment in your current state of health. This form, signed in personal communication with your physician, addresses resuscitation and other medical interventions that you may or may not want.      For more information or to schedule a time with a [facility] Advance Care Planning Facilitator contact: [email or web address] or call [phone] and someone will contact you directly.

## 2024-06-20 NOTE — PROGRESS NOTES
QUICK REFERENCE INFORMATION:  The ABCs of the Annual Wellness Visit    Subsequent Medicare Wellness Visit    HEALTH RISK ASSESSMENT    1945  Gita Avelar is a 79 y.o. female who presents for an Subsequent Wellness Visit.    The following portions of the patient's history were reviewed and   updated as appropriate: allergies, current medications, past family history, past medical history, past social history, past surgical history, and problem list.    Compared to one year ago, the patient feels his physical   health is the same.    Compared to one year ago, the patient feels his mental   health is the same.    Recent Hospitalizations:  She was admitted to the hospital during the last year at [facility], [place].   Hospital Course      Ms. Avelar is a 79 y.o. female who was admitted with chest pain and questionable syncope. Cardiology was consulted as pt was found to have Afib with RVR and elevated troponin. Started on therapeutic lovenox with plan to start apixaban at discharge per Cardiology recommendation. Echo showed normal EF but abnormal septal balance with RBBB. Further cardiac testing w/myocardial perfusion study was normal. Medications were adjusted as follows: carvedilol was changed to atenolol, amlodipine was added for better BP control. There was no significant stenosis of carotid arteries. Thyroid studies were unremarkable. She complained of neck pain that resolved; imaging showed multilevel degenerative changes. OT/PT evaluated recommending SNF. She will have ziopatch placed and discharge and follow up with Cardiology in 1 month. Stable from medical standpoint for discharge today.  Current Medical Providers:  Patient Care Team:  Mark Hernández DO as PCP - General (Family Medicine)  Cheri Perez, RN as Ambulatory  (Population Health)  Otf Arroyo MD as Consulting Physician (Ophthalmology)  Austyn Roberts MD as Consulting Physician (Cardiology)    I reviewed list of current  Medical providers and suppliers with patient and are listed above to the best of the patient's and my knowledge.    Smoking Status:  Social History     Tobacco Use   Smoking Status Former    Current packs/day: 0.00    Average packs/day: 0.5 packs/day for 29.5 years (14.7 ttl pk-yrs)    Types: Cigarettes    Start date:     Quit date: 2021    Years since quittin.9   Smokeless Tobacco Never       Alcohol Consumption:  Social History     Substance and Sexual Activity   Alcohol Use Yes    Comment: occasionally        Depression Screen:       2024    11:00 AM   PHQ-2/PHQ-9 Depression Screening   Little Interest or Pleasure in Doing Things 0-->not at all   Feeling Down, Depressed or Hopeless 0-->not at all   PHQ-9: Brief Depression Severity Measure Score 0       Health Habits and Functional and Cognitive Screenin/20/2024    11:00 AM   Functional & Cognitive Status   Do you have difficulty preparing food and eating? No   Do you have difficulty bathing yourself, getting dressed or grooming yourself? No   Do you have difficulty using the toilet? No   Do you have difficulty moving around from place to place? No   Do you have trouble with steps or getting out of a bed or a chair? No   Current Diet Well Balanced Diet   Dental Exam Not up to date   Eye Exam Up to date   Exercise (times per week) 0 times per week   Current Exercises Include No Regular Exercise   Do you need help using the phone?  No   Are you deaf or do you have serious difficulty hearing?  No   Do you need help to go to places out of walking distance? Yes   Do you need help shopping? Yes   Do you need help preparing meals?  No   Do you need help with housework?  No   Do you need help with laundry? No   Do you need help taking your medications? No   Do you need help managing money? No   Do you ever drive or ride in a car without wearing a seat belt? No   Have you felt unusual stress, anger or loneliness in the last month? No   Who do you  live with? Alone   If you need help, do you have trouble finding someone available to you? No   Have you been bothered in the last four weeks by sexual problems? No   Do you have difficulty concentrating, remembering or making decisions? No       Does the patient have evidence of cognitive impairment? No    Aspirin use counseling: Does not need ASA (and currently is not on it)    Recent Lab Results:  CMP:  Lab Results   Component Value Date    BUN 17 02/16/2024    CREATININE 0.81 02/16/2024    EGFRIFNONA 76 09/22/2021    EGFRIFAFRI 88 09/22/2021    BCR 21.0 02/16/2024     02/16/2024    K 3.9 02/16/2024    CO2 24.7 02/16/2024    CALCIUM 9.3 02/16/2024    PROTENTOTREF 6.8 03/28/2023    ALBUMIN 4.0 02/16/2024    LABGLOBREF 2.5 03/28/2023    LABGLOBREF 3.3 03/28/2023    LABIL2 1.7 03/28/2023    LABIL2 1.1 03/28/2023    BILITOT 0.4 02/16/2024    ALKPHOS 77 02/16/2024    AST 16 02/16/2024    ALT 11 02/16/2024     Lipid Panel:  Lab Results   Component Value Date    CHOL 133 01/12/2024    TRIG 58 01/12/2024    HDL 39 (L) 01/12/2024    VLDL 12 01/12/2024    LDLHDL 2.11 01/12/2024     HbA1c:  Lab Results   Component Value Date    HGBA1C 5.70 (H) 02/08/2023       Visual Acuity:  No results found.    Age-appropriate Screening Schedule:  Refer to the list below for future screening recommendations based on patient's age, sex and/or medical conditions. Orders for these recommended tests are listed in the plan section. The patient has been provided with a written plan.    Health Maintenance   Topic Date Due    DXA SCAN  01/07/2022    COVID-19 Vaccine (1 - 2023-24 season) 07/23/2024 (Originally 9/1/2023)    RSV Vaccine - Adults (1 - 1-dose 60+ series) 05/03/2025 (Originally 1/13/2005)    Pneumococcal Vaccine 65+ (1 of 1 - PCV) 05/03/2025 (Originally 1/13/2010)    TDAP/TD VACCINES (1 - Tdap) 05/03/2025 (Originally 1/13/1964)    INFLUENZA VACCINE  08/01/2024    LIPID PANEL  01/12/2025    ANNUAL WELLNESS VISIT  06/20/2025     COLORECTAL CANCER SCREENING  02/05/2026    HEPATITIS C SCREENING  Completed          Outpatient Medications Prior to Visit   Medication Sig Dispense Refill    acetaminophen (TYLENOL) 325 MG tablet Take 2 tablets by mouth Every 4 (Four) Hours As Needed for Mild Pain.      albuterol sulfate  (90 Base) MCG/ACT inhaler Inhale 2 puffs Every 4 (Four) Hours As Needed for Wheezing. 18 g 12    amLODIPine (NORVASC) 5 MG tablet Take 1 tablet by mouth Daily. 30 tablet 5    atenolol (TENORMIN) 25 MG tablet Take 1 tablet by mouth 2 (Two) Times a Day.      bisacodyl (DULCOLAX) 5 MG EC tablet Take 1 tablet by mouth Daily As Needed for Constipation. Indications: Constipation      carvedilol (COREG) 25 MG tablet Take 1 tablet by mouth 2 (Two) Times a Day With Meals.      diclofenac (VOLTAREN) 50 MG EC tablet Take 1 tablet by mouth Daily. 60 tablet 0    DULoxetine (CYMBALTA) 60 MG capsule Take 60mg + 30mg cap 1 each together PO daily 90 capsule 1    meclizine (ANTIVERT) 25 MG tablet Take 1 tablet by mouth 3 (Three) Times a Day As Needed for Dizziness. 45 tablet 1    Multiple Vitamins-Minerals (PRESERVISION AREDS PO) Take 1 tablet by mouth Daily. Indications: Vitamin Deficiency      rivaroxaban (Xarelto) 20 MG tablet Take 1 tablet by mouth Daily. 30 tablet 5    rosuvastatin (Crestor) 20 MG tablet Take 1 tablet by mouth Every Night. Indications: High Amount of Fats in the Blood 90 tablet 3    lisinopril (PRINIVIL,ZESTRIL) 40 MG tablet Take 1 tablet by mouth Daily. Indications: High Blood Pressure Disorder 90 tablet 3     No facility-administered medications prior to visit.       Patient Active Problem List   Diagnosis    Avitaminosis D    B12 deficiency    Near syncope    Atrophic vaginitis    Awareness of heartbeats    OP (osteoporosis)    HLD (hyperlipidemia)    Fatigue    Essential hypertension    Moderate episode of recurrent major depressive disorder    Nipple discharge    Abnormal ultrasound of breast    Abnormal mammogram     Lump or mass in breast    Family history of malignant neoplasm of pancreas    Atherosclerosis of both carotid arteries    Hypoxia    Generalized weakness    Dizziness on standing    History of Guillain-[place] syndrome    History of subdural hematoma    Dysautonomia    Cytokine release syndrome, grade 1    Syncope and collapse    Atrial fibrillation with RVR    Chest pain    Left knee pain    Neck pain on left side    Chronic SI joint pain       Advance Care Planning:  ACP discussion was held with the patient during this visit. Patient does not have an advance directive, information provided.    Identification of Risk Factors:  Risk factors include: Cardiovascular risk.    Review of Systems   Respiratory:  Negative for shortness of breath.    Cardiovascular:  Negative for chest pain.   Gastrointestinal:  Negative for nausea and vomiting.   Genitourinary:  Positive for frequency and hematuria. Negative for flank pain and urgency.       Objective     Physical Exam  Vitals and nursing note reviewed.   Constitutional:       Appearance: She is well-developed.   HENT:      Head: Normocephalic and atraumatic.   Neck:      Thyroid: No thyromegaly.      Vascular: No JVD.   Cardiovascular:      Rate and Rhythm: Normal rate and regular rhythm.      Heart sounds: Normal heart sounds. No murmur heard.     No friction rub. No gallop.   Pulmonary:      Effort: Pulmonary effort is normal. No respiratory distress.      Breath sounds: Normal breath sounds. No wheezing or rales.   Abdominal:      General: Bowel sounds are normal. There is no distension.      Palpations: Abdomen is soft.      Tenderness: There is no abdominal tenderness. There is no guarding or rebound.   Musculoskeletal:      Cervical back: Neck supple.   Skin:     General: Skin is warm and dry.   Neurological:      Mental Status: She is alert.   Psychiatric:         Behavior: Behavior normal.         Vitals:    06/20/24 1109   BP: 120/86   Pulse: 66   SpO2: 94%  "  Weight: 65.3 kg (144 lb)   Height: 162.6 cm (64\")   PainSc:   2   PainLoc: Back     Body mass index is 24.72 kg/m².    BMI is within normal parameters. No other follow-up for BMI required.      Assessment & Plan   Patient Self-Management and Personalized Health Advice  The patient has been provided with information about: prevention of cardiac or vascular disease and preventive services including:   Annual Wellness Visit (AWV).    Visit Diagnoses:    ICD-10-CM ICD-9-CM   1. Medicare annual wellness visit, subsequent  Z00.00 V70.0   2. PAF (paroxysmal atrial fibrillation)  I48.0 427.31   3. Primary hypertension  I10 401.9   4. Dyslipidemia  E78.5 272.4   5. Vitamin D deficiency  E55.9 268.9   6. Gross hematuria  R31.0 599.71   7. Acute UTI  N39.0 599.0       Orders Placed This Encounter   Procedures    Urine Culture - , Urine, Clean Catch     Order Specific Question:   Release to patient     Answer:   Routine Release [[id number]]    Comprehensive Metabolic Panel     Order Specific Question:   Release to patient     Answer:   Routine Release [[id number]]    Lipid Panel     Order Specific Question:   Release to patient     Answer:   Routine Release [[id number]]    Vitamin D,25-Hydroxy     Order Specific Question:   Release to patient     Answer:   Routine Release [[id number]]    Urinalysis With Microscopic - Urine, Clean Catch     Order Specific Question:   Release to patient     Answer:   Routine Release [[id number]]       Outpatient Encounter Medications as of 6/20/2024   Medication Sig Dispense Refill    acetaminophen (TYLENOL) 325 MG tablet Take 2 tablets by mouth Every 4 (Four) Hours As Needed for Mild Pain.      albuterol sulfate  (90 Base) MCG/ACT inhaler Inhale 2 puffs Every 4 (Four) Hours As Needed for Wheezing. 18 g 12    amLODIPine (NORVASC) 5 MG tablet Take 1 tablet by mouth Daily. 30 tablet 5    atenolol (TENORMIN) 25 MG tablet Take 1 tablet by mouth 2 (Two) Times a Day.      bisacodyl (DULCOLAX) " 5 MG EC tablet Take 1 tablet by mouth Daily As Needed for Constipation. Indications: Constipation      carvedilol (COREG) 25 MG tablet Take 1 tablet by mouth 2 (Two) Times a Day With Meals.      diclofenac (VOLTAREN) 50 MG EC tablet Take 1 tablet by mouth Daily. 60 tablet 0    DULoxetine (CYMBALTA) 60 MG capsule Take 60mg + 30mg cap 1 each together PO daily 90 capsule 1    lisinopril (PRINIVIL,ZESTRIL) 40 MG tablet Take 1 tablet by mouth Daily. Indications: High Blood Pressure Disorder 90 tablet 3    meclizine (ANTIVERT) 25 MG tablet Take 1 tablet by mouth 3 (Three) Times a Day As Needed for Dizziness. 45 tablet 1    Multiple Vitamins-Minerals (PRESERVISION AREDS PO) Take 1 tablet by mouth Daily. Indications: Vitamin Deficiency      rivaroxaban (Xarelto) 20 MG tablet Take 1 tablet by mouth Daily. 30 tablet 5    rosuvastatin (Crestor) 20 MG tablet Take 1 tablet by mouth Every Night. Indications: High Amount of Fats in the Blood 90 tablet 3    [DISCONTINUED] lisinopril (PRINIVIL,ZESTRIL) 40 MG tablet Take 1 tablet by mouth Daily. Indications: High Blood Pressure Disorder 90 tablet 3    cephalexin (KEFLEX) 500 MG capsule Take 1 capsule by mouth 2 (Two) Times a Day. 14 capsule 0     No facility-administered encounter medications on file as of 2024.       Reviewed use of high risk medication in the elderly: yes  Reviewed for potential of harmful drug interactions in the elderly: yes  No opioid medication identified on active medication list. I have reviewed chart for other potential  high risk medication/s and harmful drug interactions in the elderly.    Social History     Socioeconomic History    Marital status:    Tobacco Use    Smoking status: Former     Current packs/day: 0.00     Average packs/day: 0.5 packs/day for 29.5 years (14.7 ttl pk-yrs)     Types: Cigarettes     Start date:      Quit date: 2021     Years since quittin.9    Smokeless tobacco: Never   Vaping Use    Vaping status: Never  Used   Substance and Sexual Activity    Alcohol use: Yes     Comment: occasionally     Drug use: No    Sexual activity: Not Currently     Partners: Male     Patient was screened for OUD and HARLEY risk factors.  Gita Avelar's pain level was assessed as well today.  I included a review current recommendations on pain management, best use practices, current CDC guidelines, alternatives to opioids including OTC & Rx topicals, non-opioid oral medications (eg nsaids, acetominophen) and life style interventions such as yoga, lucas chi, stretching, regular exercise, PT/OT and referral to specialty care like Pain Management that specialize in pain treatment when appropriate.   I also included a review of serious risks of opioid use and substance use disorder.  I have included all of this in the after visit summary along with other risk factors identified that are pertinent to the patient today.      Follow Up:  Return in about 6 months (around 12/20/2024), or if symptoms worsen or fail to improve.     An After Visit Summary and PPPS with all of these plans were given to the patient.           ++++++++++++++++++++++++++++++++++++++++++++++++++++++++++++++++++   Additional E&M Note during same encounter follows:  Patient has multiple medical problems which are significant and separately identifiable that require additional work above and beyond the Medicare Wellness Visit.   Chief Complaint   Patient presents with    Medicare Wellness-subsequent    Hyperlipidemia    Hypertension    Atrial Fibrillation     Hyperlipidemia  This is a chronic problem. The problem is controlled. Recent lipid tests were reviewed and are normal. Pertinent negatives include no chest pain or shortness of breath. Current antihyperlipidemic treatment includes statins. The current treatment provides moderate improvement of lipids. Risk factors for coronary artery disease include dyslipidemia and hypertension.   Hypertension  This is a chronic problem.  "The problem is unchanged. The problem is controlled. Pertinent negatives include no chest pain or shortness of breath. The current treatment provides moderate improvement.   Atrial Fibrillation  Presents for follow-up visit. Symptoms are negative for chest pain, hypertension, hypotension and shortness of breath. Past medical history includes atrial fibrillation and hyperlipidemia.   Urinary Tract Infection   This is a recurrent problem. The current episode started in the past 7 days. The problem occurs intermittently. The quality of the pain is described as burning. The pain is mild. There has been no fever. Associated symptoms include frequency and hematuria. Pertinent negatives include no flank pain, nausea, urgency or vomiting. She has tried nothing for the symptoms. The treatment provided no relief.       Review of Systems   Cardiovascular:  Negative for chest pain.   Respiratory:  Negative for shortness of breath.    Gastrointestinal:  Negative for nausea and vomiting.   Genitourinary:  Positive for frequency and hematuria. Negative for flank pain and urgency.       Social History     Tobacco Use    Smoking status: Former     Current packs/day: 0.00     Average packs/day: 0.5 packs/day for 29.5 years (14.7 ttl pk-yrs)     Types: Cigarettes     Start date:      Quit date: 2021     Years since quittin.9    Smokeless tobacco: Never   Substance Use Topics    Alcohol use: Yes     Comment: occasionally      O:   Vitals:    24 1109   BP: 120/86   Pulse: 66   SpO2: 94%   Weight: 65.3 kg (144 lb)   Height: 162.6 cm (64\")   PainSc:   2   PainLoc: Back     Body mass index is 24.72 kg/m².  Vitals and nursing note reviewed.   Constitutional:       Appearance: Well-developed.   HENT:      Head: Normocephalic and atraumatic.   Neck:      Thyroid: No thyromegaly.      Vascular: No JVD.   Pulmonary:      Effort: Pulmonary effort is normal. No respiratory distress.      Breath sounds: Normal breath sounds. No " wheezing. No rales.   Cardiovascular:      Normal rate. Regular rhythm. Normal heart sounds.      No gallop.  No friction rub.   Abdominal:      General: Bowel sounds are normal. There is no distension.      Palpations: Abdomen is soft.      Tenderness: There is no abdominal tenderness. There is no guarding or rebound.   Musculoskeletal:      Cervical back: Neck supple. Skin:     General: Skin is warm and dry.   Neurological:      Mental Status: Alert.   Psychiatric:         Behavior: Behavior normal.         Diagnoses and all orders for this visit:    1. Medicare annual wellness visit, subsequent (Primary)    2. PAF (paroxysmal atrial fibrillation)    3. Primary hypertension  -     lisinopril (PRINIVIL,ZESTRIL) 40 MG tablet; Take 1 tablet by mouth Daily. Indications: High Blood Pressure Disorder  Dispense: 90 tablet; Refill: 3    4. Dyslipidemia  -     Cancel: Comprehensive Metabolic Panel  -     Cancel: Lipid Panel  -     Comprehensive Metabolic Panel  -     Lipid Panel    5. Vitamin D deficiency  -     Cancel: Vitamin D,25-Hydroxy  -     Vitamin D,25-Hydroxy    6. Gross hematuria  -     Urinalysis With Microscopic - Urine, Clean Catch  -     Urine Culture - , Urine, Clean Catch  -     cephalexin (KEFLEX) 500 MG capsule; Take 1 capsule by mouth 2 (Two) Times a Day.  Dispense: 14 capsule; Refill: 0    7. Acute UTI  -     Urinalysis With Microscopic - Urine, Clean Catch  -     Urine Culture - , Urine, Clean Catch  -     cephalexin (KEFLEX) 500 MG capsule; Take 1 capsule by mouth 2 (Two) Times a Day.  Dispense: 14 capsule; Refill: 0    PAF _ contineu xarelto  UTI - symptoms send urinary studies and will start abx in interim  Due check vitamin D  -HLD - continue statin, recheck lipids.  -hypertension - controlled, continue medications      Return in about 6 months (around 12/20/2024), or if symptoms worsen or fail to improve.

## 2024-06-24 LAB
25(OH)D3+25(OH)D2 SERPL-MCNC: 25.1 NG/ML (ref 30–100)
ALBUMIN SERPL-MCNC: 4 G/DL (ref 3.8–4.8)
ALP SERPL-CCNC: 88 IU/L (ref 44–121)
ALT SERPL-CCNC: 21 IU/L (ref 0–32)
APPEARANCE UR: ABNORMAL
AST SERPL-CCNC: 27 IU/L (ref 0–40)
BACTERIA #/AREA URNS HPF: NORMAL /[HPF]
BACTERIA UR CULT: ABNORMAL
BACTERIA UR CULT: ABNORMAL
BILIRUB SERPL-MCNC: 0.5 MG/DL (ref 0–1.2)
BILIRUB UR QL STRIP: NEGATIVE
BUN SERPL-MCNC: 18 MG/DL (ref 8–27)
BUN/CREAT SERPL: 23 (ref 12–28)
CALCIUM SERPL-MCNC: 9.2 MG/DL (ref 8.7–10.3)
CASTS URNS QL MICRO: NORMAL /LPF
CHLORIDE SERPL-SCNC: 104 MMOL/L (ref 96–106)
CHOLEST SERPL-MCNC: 187 MG/DL (ref 100–199)
CO2 SERPL-SCNC: 25 MMOL/L (ref 20–29)
COLOR UR: YELLOW
CREAT SERPL-MCNC: 0.78 MG/DL (ref 0.57–1)
EGFRCR SERPLBLD CKD-EPI 2021: 77 ML/MIN/1.73
EPI CELLS #/AREA URNS HPF: NORMAL /HPF (ref 0–10)
GLOBULIN SER CALC-MCNC: 2.2 G/DL (ref 1.5–4.5)
GLUCOSE SERPL-MCNC: 82 MG/DL (ref 70–99)
GLUCOSE UR QL STRIP: NEGATIVE
HDLC SERPL-MCNC: 54 MG/DL
HGB UR QL STRIP: NEGATIVE
KETONES UR QL STRIP: NEGATIVE
LDLC SERPL CALC-MCNC: 116 MG/DL (ref 0–99)
LEUKOCYTE ESTERASE UR QL STRIP: ABNORMAL
MICRO URNS: ABNORMAL
NITRITE UR QL STRIP: NEGATIVE
OTHER ANTIBIOTIC SUSC ISLT: ABNORMAL
PH UR STRIP: 7 [PH] (ref 5–7.5)
POTASSIUM SERPL-SCNC: 4.3 MMOL/L (ref 3.5–5.2)
PROT SERPL-MCNC: 6.2 G/DL (ref 6–8.5)
PROT UR QL STRIP: NEGATIVE
RBC #/AREA URNS HPF: NORMAL /HPF (ref 0–2)
SODIUM SERPL-SCNC: 140 MMOL/L (ref 134–144)
SP GR UR STRIP: 1.02 (ref 1–1.03)
TRIGL SERPL-MCNC: 91 MG/DL (ref 0–149)
UROBILINOGEN UR STRIP-MCNC: 0.2 MG/DL (ref 0.2–1)
VLDLC SERPL CALC-MCNC: 17 MG/DL (ref 5–40)
WBC #/AREA URNS HPF: NORMAL /HPF (ref 0–5)

## 2024-06-24 NOTE — PROGRESS NOTES
Call results to patient.  Vitamin D recommend take a total of vitamin D3 5000 iu daily  Urine culture positive, finish antibiotic  Cholesterol flaquito a little though ok for non-diabetic (and much better than past), work on diet and exercise;  HDL (good cholesterol) improved and great level

## 2024-07-01 RX ORDER — CARVEDILOL 25 MG/1
TABLET ORAL
Qty: 180 TABLET | Refills: 1 | Status: SHIPPED | OUTPATIENT
Start: 2024-07-01

## 2024-07-11 DIAGNOSIS — G89.29 CHRONIC SI JOINT PAIN: ICD-10-CM

## 2024-07-11 DIAGNOSIS — M53.3 CHRONIC SI JOINT PAIN: ICD-10-CM

## 2024-08-07 ENCOUNTER — TELEPHONE (OUTPATIENT)
Dept: FAMILY MEDICINE CLINIC | Facility: CLINIC | Age: 79
End: 2024-08-07
Payer: MEDICARE

## 2024-09-03 ENCOUNTER — TELEPHONE (OUTPATIENT)
Dept: FAMILY MEDICINE CLINIC | Facility: CLINIC | Age: 79
End: 2024-09-03
Payer: MEDICARE

## 2024-09-03 NOTE — TELEPHONE ENCOUNTER
Caller: Gita Avelar    Relationship: Self    Best call back number: [phone]    What medication are you requesting: ANTIBIOTIC FOR DIVERTICULITIS PATIENT STATES SHE  GETS THIS EVERY 5 YEARS     What are your current symptoms: PAIN IN LEFT SIDE TROUBLE BEING CONSTIPATED, TENDER ON LEFT SIDE    How long have you been experiencing symptoms: YESTERDAY 9/1/24    Have you had these symptoms before:    [x] Yes  [] No    Have you been treated for these symptoms before:   [x] Yes  [] No    If a prescription is needed, what is your preferred pharmacy and phone number:      [facility] [address] [place] [id number] [facility] [phone] [facility] [phone]          Additional notes:

## 2024-09-16 DIAGNOSIS — E78.2 MIXED HYPERLIPIDEMIA: ICD-10-CM

## 2024-09-16 RX ORDER — ROSUVASTATIN CALCIUM 20 MG/1
20 TABLET, COATED ORAL NIGHTLY
Qty: 90 TABLET | Refills: 0 | Status: SHIPPED | OUTPATIENT
Start: 2024-09-16

## 2024-09-23 DIAGNOSIS — F33.1 MODERATE EPISODE OF RECURRENT MAJOR DEPRESSIVE DISORDER: ICD-10-CM

## 2024-09-23 RX ORDER — DULOXETIN HYDROCHLORIDE 30 MG/1
CAPSULE, DELAYED RELEASE ORAL
Qty: 90 CAPSULE | Refills: 1 | OUTPATIENT
Start: 2024-09-23

## 2024-12-20 ENCOUNTER — OFFICE VISIT (OUTPATIENT)
Dept: FAMILY MEDICINE CLINIC | Facility: CLINIC | Age: 79
End: 2024-12-20
Payer: MEDICARE

## 2024-12-20 VITALS
BODY MASS INDEX: 24.75 KG/M2 | HEIGHT: 64 IN | WEIGHT: 145 LBS | OXYGEN SATURATION: 94 % | HEART RATE: 62 BPM | DIASTOLIC BLOOD PRESSURE: 80 MMHG | SYSTOLIC BLOOD PRESSURE: 142 MMHG

## 2024-12-20 DIAGNOSIS — H53.16 CHARLES BONNET SYNDROME: ICD-10-CM

## 2024-12-20 DIAGNOSIS — I10 PRIMARY HYPERTENSION: ICD-10-CM

## 2024-12-20 DIAGNOSIS — R44.3 HALLUCINATIONS: ICD-10-CM

## 2024-12-20 DIAGNOSIS — M15.0 PRIMARY OSTEOARTHRITIS INVOLVING MULTIPLE JOINTS: ICD-10-CM

## 2024-12-20 DIAGNOSIS — E78.5 DYSLIPIDEMIA: ICD-10-CM

## 2024-12-20 DIAGNOSIS — I48.0 PAF (PAROXYSMAL ATRIAL FIBRILLATION): Primary | ICD-10-CM

## 2024-12-20 DIAGNOSIS — F32.1 CURRENT MODERATE EPISODE OF MAJOR DEPRESSIVE DISORDER WITHOUT PRIOR EPISODE: ICD-10-CM

## 2024-12-20 DIAGNOSIS — F33.1 MODERATE EPISODE OF RECURRENT MAJOR DEPRESSIVE DISORDER: ICD-10-CM

## 2024-12-20 DIAGNOSIS — E55.9 VITAMIN D DEFICIENCY: ICD-10-CM

## 2024-12-20 DIAGNOSIS — E53.8 B12 DEFICIENCY: ICD-10-CM

## 2024-12-20 DIAGNOSIS — R41.3 MEMORY LOSS: ICD-10-CM

## 2024-12-20 RX ORDER — TRIAMCINOLONE ACETONIDE 40 MG/ML
80 INJECTION, SUSPENSION INTRA-ARTICULAR; INTRAMUSCULAR ONCE
Status: COMPLETED | OUTPATIENT
Start: 2024-12-20 | End: 2024-12-20

## 2024-12-20 RX ORDER — DICLOFENAC EPOLAMINE 0.01 G/1
1 SYSTEM TOPICAL 2 TIMES DAILY
Qty: 60 PATCH | Refills: 5 | Status: SHIPPED | OUTPATIENT
Start: 2024-12-20

## 2024-12-20 RX ORDER — DULOXETIN HYDROCHLORIDE 60 MG/1
CAPSULE, DELAYED RELEASE ORAL
Qty: 90 CAPSULE | Refills: 1 | Status: SHIPPED | OUTPATIENT
Start: 2024-12-20

## 2024-12-20 RX ORDER — DULOXETIN HYDROCHLORIDE 30 MG/1
CAPSULE, DELAYED RELEASE ORAL
Qty: 90 CAPSULE | Refills: 1 | Status: SHIPPED | OUTPATIENT
Start: 2024-12-20

## 2024-12-20 RX ADMIN — TRIAMCINOLONE ACETONIDE 80 MG: 40 INJECTION, SUSPENSION INTRA-ARTICULAR; INTRAMUSCULAR at 12:09

## 2024-12-20 NOTE — PROGRESS NOTES
Subjective   Gita Avelar is a 79 y.o. female. Presents today for   Chief Complaint   Patient presents with    Hypertension    Hyperlipidemia    Fatigue    Hallucinations     Sees people in her room       History of Present Illness  Patient 78 y/o with htn, hld, paf here for f/u;    She is haivng visual hallucinations, but aware not real;  no f/c;  no illnesses, though in a lot of pain; Some memory loss, but relates not much worse;  She does have macular degeneration.   She reports also wide spread arthritis pain;  back is severe;   on DOAC so limited what can take.   Also veyr depressed again;  since recall has not been able to get the 30mg capsule for mood;      Review of Systems   Respiratory:  Negative for shortness of breath.    Cardiovascular:  Negative for chest pain.   Gastrointestinal:  Negative for abdominal pain.   Musculoskeletal:  Positive for arthralgias and gait problem.   Psychiatric/Behavioral:  Positive for hallucinations.        Patient Active Problem List   Diagnosis    Avitaminosis D    B12 deficiency    Near syncope    Atrophic vaginitis    Awareness of heartbeats    OP (osteoporosis)    HLD (hyperlipidemia)    Fatigue    Essential hypertension    Moderate episode of recurrent major depressive disorder    Nipple discharge    Abnormal ultrasound of breast    Abnormal mammogram    Lump or mass in breast    Family history of malignant neoplasm of pancreas    Atherosclerosis of both carotid arteries    Hypoxia    Generalized weakness    Dizziness on standing    History of Guillain-Narka syndrome    History of subdural hematoma    Dysautonomia    Cytokine release syndrome, grade 1    Syncope and collapse    Atrial fibrillation with RVR    Chest pain    Left knee pain    Neck pain on left side    Chronic SI joint pain       Social History     Socioeconomic History    Marital status:    Tobacco Use    Smoking status: Former     Current packs/day: 0.00     Average packs/day: 0.5 packs/day  for 29.5 years (14.7 ttl pk-yrs)     Types: Cigarettes     Start date: 1/1/1992     Quit date: 7/1/2021     Years since quitting: 3.4     Passive exposure: Past    Smokeless tobacco: Never   Vaping Use    Vaping status: Never Used   Substance and Sexual Activity    Alcohol use: Yes     Comment: occasionally     Drug use: No    Sexual activity: Not Currently     Partners: Male       No Known Allergies    Current Outpatient Medications on File Prior to Visit   Medication Sig Dispense Refill    acetaminophen (TYLENOL) 325 MG tablet Take 2 tablets by mouth Every 4 (Four) Hours As Needed for Mild Pain.      albuterol sulfate  (90 Base) MCG/ACT inhaler Inhale 2 puffs Every 4 (Four) Hours As Needed for Wheezing. 18 g 12    amLODIPine (NORVASC) 5 MG tablet Take 1 tablet by mouth Daily. 30 tablet 5    atenolol (TENORMIN) 25 MG tablet Take 1 tablet by mouth 2 (Two) Times a Day.      carvedilol (COREG) 25 MG tablet TAKE 1 TABLET BY MOUTH TWICE DAILY WITH MEALS FOR HIGH BLOOD PRESSURE 180 tablet 1    DULoxetine (CYMBALTA) 60 MG capsule Take 60mg + 30mg cap 1 each together PO daily (Patient taking differently: Take 1 capsule by mouth Daily.) 90 capsule 1    lisinopril (PRINIVIL,ZESTRIL) 40 MG tablet Take 1 tablet by mouth Daily. Indications: High Blood Pressure Disorder 90 tablet 3    meclizine (ANTIVERT) 25 MG tablet Take 1 tablet by mouth 3 (Three) Times a Day As Needed for Dizziness. 45 tablet 1    Multiple Vitamins-Minerals (PRESERVISION AREDS PO) Take 1 tablet by mouth Daily. Indications: Vitamin Deficiency      rivaroxaban (Xarelto) 20 MG tablet Take 1 tablet by mouth Daily. 30 tablet 5    rosuvastatin (Crestor) 20 MG tablet Take 1 tablet by mouth Every Night. Indications: High Amount of Fats in the Blood 90 tablet 0    diclofenac (VOLTAREN) 50 MG EC tablet Take 1 tablet by mouth once daily (Patient not taking: Reported on 12/20/2024) 60 tablet 0    [DISCONTINUED] amoxicillin-clavulanate (AUGMENTIN) 875-125 MG per  "tablet Take 1 tablet by mouth 2 (Two) Times a Day. (Patient not taking: Reported on 12/20/2024) 14 tablet 0    [DISCONTINUED] bisacodyl (DULCOLAX) 5 MG EC tablet Take 1 tablet by mouth Daily As Needed for Constipation. Indications: Constipation (Patient not taking: Reported on 12/20/2024)      [DISCONTINUED] cephalexin (KEFLEX) 500 MG capsule Take 1 capsule by mouth 2 (Two) Times a Day. (Patient not taking: Reported on 12/20/2024) 14 capsule 0     No current facility-administered medications on file prior to visit.       Objective   Vitals:    12/20/24 1140   BP: 142/80   Pulse: 62   SpO2: 94%   Weight: 65.8 kg (145 lb)   Height: 162.6 cm (64\")   PainSc:   6   PainLoc: Back     Body mass index is 24.89 kg/m².    Physical Exam  Vitals and nursing note reviewed.   Constitutional:       Appearance: Normal appearance. She is not toxic-appearing or diaphoretic.   HENT:      Head: Normocephalic and atraumatic.   Musculoskeletal:      Cervical back: Neck supple.   Skin:     General: Skin is warm and dry.      Capillary Refill: Capillary refill takes less than 2 seconds.   Neurological:      Mental Status: She is alert.      Gait: Gait abnormal.   Psychiatric:         Mood and Affect: Mood normal.         Behavior: Behavior normal.         Assessment & Plan   Diagnoses and all orders for this visit:    1. PAF (paroxysmal atrial fibrillation) (Primary)    2. Primary hypertension    3. Dyslipidemia  -     Comprehensive Metabolic Panel  -     Lipid Panel    4. Vitamin D deficiency  -     Vitamin D,25-Hydroxy    5. Hallucinations  -     Urinalysis With Microscopic - Urine, Clean Catch    6. B12 deficiency  -     CBC (No Diff)  -     Vitamin B12    7. Memory loss  -     TSH  -     Vitamin B12  -     RPR Qualitative with Reflex to Quant    8. Hermes Bonnet syndrome    9. Current moderate episode of major depressive disorder without prior episode    10. Primary osteoarthritis involving multiple joints  -     Diclofenac Epolamine " (FLECTOR) 1.3 % patch patch; Apply 1 patch topically to the appropriate area as directed 2 (Two) Times a Day. To back  Dispense: 60 patch; Refill: 5  -     Diclofenac Sodium (VOLTAREN) 1 % gel gel; Apply 4 g topically to the appropriate area as directed 4 (Four) Times a Day As Needed (knee/shoulder pain).  Dispense: 150 g; Refill: 5  -     triamcinolone acetonide (KENALOG-40) injection 80 mg    11. Moderate episode of recurrent major depressive disorder  -     DULoxetine (CYMBALTA) 30 MG capsule; Take 1 po daily with 60mg capsule  Dispense: 90 capsule; Refill: 1  -     DULoxetine (CYMBALTA) 60 MG capsule; Take 60mg + 30mg cap 1 each together PO daily  Dispense: 90 capsule; Refill: 1    -hypertension - controlled, continue medications  -HLD - continue statin, recheck lipids.  -will order labs for memory, hallucionation likely ehsan bonnet syndrome given macular degeneration;  gave handout on;  gave also handicap placard application  Paf - continue xarelto  Will try steroid to calm down pain and topicals  Due chck b12 and vitamin D           BMI is within normal parameters. No other follow-up for BMI required.     -Follow up: 2 to 3 months and prn

## 2024-12-21 LAB
25(OH)D3+25(OH)D2 SERPL-MCNC: 33 NG/ML (ref 30–100)
ALBUMIN SERPL-MCNC: 4 G/DL (ref 3.8–4.8)
ALP SERPL-CCNC: 81 IU/L (ref 44–121)
ALT SERPL-CCNC: 13 IU/L (ref 0–32)
APPEARANCE UR: CLEAR
AST SERPL-CCNC: 21 IU/L (ref 0–40)
BACTERIA #/AREA URNS HPF: ABNORMAL /[HPF]
BILIRUB SERPL-MCNC: 0.5 MG/DL (ref 0–1.2)
BILIRUB UR QL STRIP: NEGATIVE
BUN SERPL-MCNC: 24 MG/DL (ref 8–27)
BUN/CREAT SERPL: 27 (ref 12–28)
CALCIUM SERPL-MCNC: 9.9 MG/DL (ref 8.7–10.3)
CASTS URNS MICRO: ABNORMAL
CASTS URNS QL MICRO: PRESENT /LPF
CHLORIDE SERPL-SCNC: 105 MMOL/L (ref 96–106)
CHOLEST SERPL-MCNC: 169 MG/DL (ref 100–199)
CO2 SERPL-SCNC: 25 MMOL/L (ref 20–29)
COLOR UR: YELLOW
CREAT SERPL-MCNC: 0.89 MG/DL (ref 0.57–1)
CRYSTALS URNS MICRO: ABNORMAL
EGFRCR SERPLBLD CKD-EPI 2021: 66 ML/MIN/1.73
EPI CELLS #/AREA URNS HPF: ABNORMAL /HPF (ref 0–10)
ERYTHROCYTE [DISTWIDTH] IN BLOOD BY AUTOMATED COUNT: 12.2 % (ref 11.7–15.4)
GLOBULIN SER CALC-MCNC: 2.4 G/DL (ref 1.5–4.5)
GLUCOSE SERPL-MCNC: 88 MG/DL (ref 70–99)
GLUCOSE UR QL STRIP: NEGATIVE
HCT VFR BLD AUTO: 46.1 % (ref 34–46.6)
HDLC SERPL-MCNC: 48 MG/DL
HGB BLD-MCNC: 14.7 G/DL (ref 11.1–15.9)
HGB UR QL STRIP: NEGATIVE
KETONES UR QL STRIP: NEGATIVE
LDLC SERPL CALC-MCNC: 98 MG/DL (ref 0–99)
LEUKOCYTE ESTERASE UR QL STRIP: ABNORMAL
MCH RBC QN AUTO: 29.9 PG (ref 26.6–33)
MCHC RBC AUTO-ENTMCNC: 31.9 G/DL (ref 31.5–35.7)
MCV RBC AUTO: 94 FL (ref 79–97)
MICRO URNS: ABNORMAL
MUCOUS THREADS URNS QL MICRO: PRESENT
NITRITE UR QL STRIP: NEGATIVE
PH UR STRIP: 6.5 [PH] (ref 5–7.5)
PLATELET # BLD AUTO: 211 X10E3/UL (ref 150–450)
POTASSIUM SERPL-SCNC: 4.1 MMOL/L (ref 3.5–5.2)
PROT SERPL-MCNC: 6.4 G/DL (ref 6–8.5)
PROT UR QL STRIP: ABNORMAL
RBC # BLD AUTO: 4.91 X10E6/UL (ref 3.77–5.28)
RBC #/AREA URNS HPF: ABNORMAL /HPF (ref 0–2)
RPR SER QL: NON REACTIVE
SODIUM SERPL-SCNC: 143 MMOL/L (ref 134–144)
SP GR UR STRIP: 1.02 (ref 1–1.03)
TRIGL SERPL-MCNC: 127 MG/DL (ref 0–149)
TSH SERPL DL<=0.005 MIU/L-ACNC: 5.38 UIU/ML (ref 0.45–4.5)
UNIDENT CRYS URNS QL MICRO: PRESENT
UROBILINOGEN UR STRIP-MCNC: 1 MG/DL (ref 0.2–1)
VIT B12 SERPL-MCNC: 543 PG/ML (ref 232–1245)
VLDLC SERPL CALC-MCNC: 23 MG/DL (ref 5–40)
WBC # BLD AUTO: 7.5 X10E3/UL (ref 3.4–10.8)
WBC #/AREA URNS HPF: ABNORMAL /HPF (ref 0–5)

## 2024-12-22 DIAGNOSIS — E78.2 MIXED HYPERLIPIDEMIA: ICD-10-CM

## 2024-12-23 RX ORDER — ROSUVASTATIN CALCIUM 20 MG/1
20 TABLET, COATED ORAL NIGHTLY
Qty: 90 TABLET | Refills: 0 | Status: SHIPPED | OUTPATIENT
Start: 2024-12-23

## 2024-12-25 DIAGNOSIS — E03.9 ACQUIRED HYPOTHYROIDISM: Primary | ICD-10-CM

## 2025-01-03 DIAGNOSIS — R39.9 UTI SYMPTOMS: Primary | ICD-10-CM

## 2025-01-06 LAB
APPEARANCE UR: ABNORMAL
BACTERIA #/AREA URNS HPF: ABNORMAL /[HPF]
BILIRUB UR QL STRIP: NEGATIVE
CASTS URNS QL MICRO: ABNORMAL /LPF
COLOR UR: YELLOW
CRYSTALS URNS MICRO: ABNORMAL
EPI CELLS #/AREA URNS HPF: ABNORMAL /HPF (ref 0–10)
GLUCOSE UR QL STRIP: NEGATIVE
HGB UR QL STRIP: NEGATIVE
KETONES UR QL STRIP: NEGATIVE
LEUKOCYTE ESTERASE UR QL STRIP: ABNORMAL
MICRO URNS: ABNORMAL
MUCOUS THREADS URNS QL MICRO: PRESENT
NITRITE UR QL STRIP: NEGATIVE
PH UR STRIP: 6.5 [PH] (ref 5–7.5)
PROT UR QL STRIP: ABNORMAL
RBC #/AREA URNS HPF: ABNORMAL /HPF (ref 0–2)
SP GR UR STRIP: 1.02 (ref 1–1.03)
T3FREE SERPL-MCNC: 2.2 PG/ML (ref 2–4.4)
T4 FREE SERPL-MCNC: 1.14 NG/DL (ref 0.82–1.77)
THYROGLOB AB SERPL-ACNC: <1 IU/ML (ref 0–0.9)
THYROPEROXIDASE AB SERPL-ACNC: 11 IU/ML (ref 0–34)
TSH RECEP AB SER-ACNC: <1.1 IU/L (ref 0–1.75)
TSH SERPL DL<=0.005 MIU/L-ACNC: 4.35 UIU/ML (ref 0.45–4.5)
UNIDENT CRYS URNS QL MICRO: PRESENT
UROBILINOGEN UR STRIP-MCNC: 0.2 MG/DL (ref 0.2–1)
WBC #/AREA URNS HPF: ABNORMAL /HPF (ref 0–5)

## 2025-01-07 DIAGNOSIS — E78.2 MIXED HYPERLIPIDEMIA: ICD-10-CM

## 2025-01-07 DIAGNOSIS — I1A.0 RESISTANT HYPERTENSION: ICD-10-CM

## 2025-01-07 RX ORDER — AMLODIPINE BESYLATE 5 MG/1
5 TABLET ORAL DAILY
Qty: 30 TABLET | Refills: 5 | Status: SHIPPED | OUTPATIENT
Start: 2025-01-07

## 2025-01-07 RX ORDER — ROSUVASTATIN CALCIUM 20 MG/1
20 TABLET, COATED ORAL NIGHTLY
Qty: 90 TABLET | Refills: 0 | Status: SHIPPED | OUTPATIENT
Start: 2025-01-07

## 2025-01-07 NOTE — TELEPHONE ENCOUNTER
Caller: Valentino Gita S    Relationship: Self    Best call back number:  [phone]     Requested Prescriptions:   Requested Prescriptions     Pending Prescriptions Disp Refills    amLODIPine (NORVASC) 5 MG tablet 30 tablet 5     Sig: Take 1 tablet by mouth Daily.    rosuvastatin (CRESTOR) 20 MG tablet 90 tablet 0     Sig: Take 1 tablet by mouth Every Night.        Pharmacy where request should be sent: [address] [phone] [facility] [phone] [facility]     Last office visit with prescribing clinician: 12/20/2024   Last telemedicine visit with prescribing clinician: Visit date not found   Next office visit with prescribing clinician: 3/12/2025     Additional details provided by patient:      Does the patient have less than a 3 day supply:  [x] Yes  [] No    Would you like a call back once the refill request has been completed: [x] Yes [] No    If the office needs to give you a call back, can they leave a voicemail: [x] Yes [] No    Holger Alonso Rep   01/07/25 14:42 EST

## 2025-01-13 RX ORDER — CARVEDILOL 25 MG/1
TABLET ORAL
Qty: 180 TABLET | Refills: 0 | Status: SHIPPED | OUTPATIENT
Start: 2025-01-13 | End: 2025-01-20

## 2025-01-20 RX ORDER — CARVEDILOL 25 MG/1
TABLET ORAL
Qty: 180 TABLET | Refills: 0 | Status: SHIPPED | OUTPATIENT
Start: 2025-01-20

## 2025-02-03 RX ORDER — CARVEDILOL 25 MG/1
TABLET ORAL
Qty: 180 TABLET | Refills: 0 | Status: SHIPPED | OUTPATIENT
Start: 2025-02-03

## 2025-05-30 ENCOUNTER — TELEPHONE (OUTPATIENT)
Dept: FAMILY MEDICINE CLINIC | Facility: CLINIC | Age: 80
End: 2025-05-30

## 2025-05-30 NOTE — TELEPHONE ENCOUNTER
Caller: ЮЛИЯ WITH [employer] [facility]    Relationship:     Best call back number: [phone]     What orders are you requesting (i.e. lab or imaging):      In what timeframe would the patient need to come in:      Where will you receive your lab/imaging services:      Additional notes: CALLED AND NEED VERBAL ORDERS FOR PHYSICAL THERAPY, OCCUPATIONAL THERAPY AND NURSING FOR THE PATIENT.  PATIENT WAS IN [facility].

## 2025-06-02 ENCOUNTER — OFFICE VISIT (OUTPATIENT)
Dept: FAMILY MEDICINE CLINIC | Facility: CLINIC | Age: 80
End: 2025-06-02
Payer: MEDICARE

## 2025-06-02 VITALS
WEIGHT: 142 LBS | OXYGEN SATURATION: 95 % | BODY MASS INDEX: 24.24 KG/M2 | HEIGHT: 64 IN | SYSTOLIC BLOOD PRESSURE: 125 MMHG | DIASTOLIC BLOOD PRESSURE: 85 MMHG | HEART RATE: 72 BPM

## 2025-06-02 DIAGNOSIS — E53.8 B12 DEFICIENCY: ICD-10-CM

## 2025-06-02 DIAGNOSIS — M54.50 CHRONIC MIDLINE LOW BACK PAIN WITHOUT SCIATICA: ICD-10-CM

## 2025-06-02 DIAGNOSIS — I48.0 PAF (PAROXYSMAL ATRIAL FIBRILLATION): Primary | ICD-10-CM

## 2025-06-02 DIAGNOSIS — G89.29 CHRONIC MIDLINE LOW BACK PAIN WITHOUT SCIATICA: ICD-10-CM

## 2025-06-02 DIAGNOSIS — E78.2 MIXED HYPERLIPIDEMIA: ICD-10-CM

## 2025-06-02 DIAGNOSIS — I10 PRIMARY HYPERTENSION: ICD-10-CM

## 2025-06-02 DIAGNOSIS — E55.9 VITAMIN D DEFICIENCY: ICD-10-CM

## 2025-06-02 DIAGNOSIS — K59.04 CHRONIC IDIOPATHIC CONSTIPATION: ICD-10-CM

## 2025-06-02 PROCEDURE — 1125F AMNT PAIN NOTED PAIN PRSNT: CPT | Performed by: FAMILY MEDICINE

## 2025-06-02 PROCEDURE — 3074F SYST BP LT 130 MM HG: CPT | Performed by: FAMILY MEDICINE

## 2025-06-02 PROCEDURE — 3079F DIAST BP 80-89 MM HG: CPT | Performed by: FAMILY MEDICINE

## 2025-06-02 PROCEDURE — 99214 OFFICE O/P EST MOD 30 MIN: CPT | Performed by: FAMILY MEDICINE

## 2025-06-02 PROCEDURE — 1111F DSCHRG MED/CURRENT MED MERGE: CPT | Performed by: FAMILY MEDICINE

## 2025-06-02 PROCEDURE — 96372 THER/PROPH/DIAG INJ SC/IM: CPT | Performed by: FAMILY MEDICINE

## 2025-06-02 RX ORDER — TRIAMCINOLONE ACETONIDE 40 MG/ML
80 INJECTION, SUSPENSION INTRA-ARTICULAR; INTRAMUSCULAR ONCE
Status: COMPLETED | OUTPATIENT
Start: 2025-06-02 | End: 2025-06-02

## 2025-06-02 RX ORDER — LISINOPRIL 10 MG/1
10 TABLET ORAL DAILY
COMMUNITY

## 2025-06-02 RX ORDER — CARVEDILOL 12.5 MG/1
12.5 TABLET ORAL 2 TIMES DAILY WITH MEALS
COMMUNITY

## 2025-06-02 RX ORDER — ASPIRIN 81 MG/1
81 TABLET ORAL DAILY
COMMUNITY
Start: 2025-05-21

## 2025-06-02 RX ORDER — AMLODIPINE BESYLATE 10 MG/1
10 TABLET ORAL DAILY
COMMUNITY

## 2025-06-02 RX ORDER — AMIODARONE HYDROCHLORIDE 200 MG/1
200 TABLET ORAL DAILY
COMMUNITY
Start: 2025-05-21

## 2025-06-02 RX ADMIN — TRIAMCINOLONE ACETONIDE 80 MG: 40 INJECTION, SUSPENSION INTRA-ARTICULAR; INTRAMUSCULAR at 16:29

## 2025-06-02 NOTE — PROGRESS NOTES
Subjective   Gita Avelar is a 80 y.o. female. Presents today for   Chief Complaint   Patient presents with    Hypertension    Hyperlipidemia    Atrial Fibrillation    Hospital Follow Up Visit     [facility]         History of Present Illness  History of Present Illness  The patient is an 80-year-old female with hypertension, hyperlipidemia, and paroxysmal atrial fibrillation. She is on chronic anticoagulation. She was last seen on 12/20/2024. At that visit, she complained of seeing things that were not there, though she was aware that they were not real. Hermes Bonnet syndrome was suspected given her history of macular degeneration. She is also here for a hospital follow-up. She was admitted to [facility] on 05/18/2025 and discharged on 05/21/2025 with a diagnosis of chest tightness accompanied by neck pain and head fullness, which she described as feeling like a heart attack. She has a history of smoking. While in the emergency room, she was found to have atrial fibrillation with rapid ventricular response (A-fib with RVR). Per discharge note, she was started on Eliquis twice daily, Coreg, and amiodarone.    She reports persistent fatigue and severe back pain, which significantly impairs her ability to perform daily activities such as standing, combing her hair, or brushing her teeth. She expresses a desire for stronger analgesics than Tylenol and inquires about the possibility of obtaining prescription patches. She has been utilizing Salonpas patches for relief but attempts to limit their use due to cost considerations.    She also reports constipation. She has tried various over-the-counter stool softeners, including MiraLAX, which she takes daily, and milk of magnesia, which provides some relief.    SOCIAL HISTORY  She has a history of smoking.  Review of Systems   Constitutional:  Positive for fatigue.   Respiratory:  Negative for shortness of breath.    Cardiovascular:  Negative  "for chest pain and palpitations.   Gastrointestinal:  Negative for abdominal pain.       Patient Active Problem List   Diagnosis    Avitaminosis D    B12 deficiency    Near syncope    Atrophic vaginitis    Awareness of heartbeats    OP (osteoporosis)    HLD (hyperlipidemia)    Fatigue    Essential hypertension    Moderate episode of recurrent major depressive disorder    Nipple discharge    Abnormal ultrasound of breast    Abnormal mammogram    Lump or mass in breast    Family history of malignant neoplasm of pancreas    Atherosclerosis of both carotid arteries    Hypoxia    Generalized weakness    Dizziness on standing    History of Guillain-Carey syndrome    History of subdural hematoma    Dysautonomia    Cytokine release syndrome, grade 1    Syncope and collapse    Atrial fibrillation with RVR    Chest pain    Left knee pain    Neck pain on left side    Chronic SI joint pain    Hermes Bonnet syndrome       Social History     Socioeconomic History    Marital status:    Tobacco Use    Smoking status: Former     Current packs/day: 0.00     Average packs/day: 0.5 packs/day for 29.5 years (14.7 ttl pk-yrs)     Types: Cigarettes     Start date: 1/1/1992     Quit date: 7/1/2021     Years since quitting: 3.9     Passive exposure: Past    Smokeless tobacco: Never   Vaping Use    Vaping status: Never Used   Substance and Sexual Activity    Alcohol use: Yes     Comment: occasionally     Drug use: No    Sexual activity: Not Currently     Partners: Male       No Known Allergies      Objective   Vitals:    06/02/25 1557   BP: 134/92   Pulse: 72   SpO2: 95%   Weight: 64.4 kg (142 lb)   Height: 162.6 cm (64\")     Body mass index is 24.37 kg/m².    Physical Exam  Vitals and nursing note reviewed.   Constitutional:       Appearance: She is well-developed.   HENT:      Head: Normocephalic and atraumatic.   Neck:      Thyroid: No thyromegaly.      Vascular: No JVD.   Cardiovascular:      Rate and Rhythm: Normal rate and " regular rhythm.      Heart sounds: Normal heart sounds. No murmur heard.     No friction rub. No gallop.   Pulmonary:      Effort: Pulmonary effort is normal. No respiratory distress.      Breath sounds: Normal breath sounds. No wheezing or rales.   Abdominal:      General: Bowel sounds are normal. There is no distension.      Palpations: Abdomen is soft.      Tenderness: There is no abdominal tenderness. There is no guarding or rebound.   Musculoskeletal:      Cervical back: Neck supple.   Skin:     General: Skin is warm and dry.   Neurological:      Mental Status: She is alert.      Gait: Gait abnormal.   Psychiatric:         Behavior: Behavior normal.       Physical Exam  Respiratory: Clear to auscultation, no wheezing, rales or rhonchi    Results  Imaging   - Echo: 05/18/2025, Mild concentric left ventricular hypertrophy, impaired relaxation, grade 1 diastolic dysfunction, ejection fraction visually estimated 55 to 60%, normal right ventricular size and function, mild mitral regurgitation present, mild to moderate tricuspid regurgitation, RSV  is around 40 mm of mercury.    Diagnostic Testing   - Stress test: 01/14/2024, Findings consistent with a normal EKG stress test. Myocardial perfusion imaging indicated a normal myocardial perfusion study with no evidence of ischemia. Impressions were consistent with a low risk study.     Assessment & Plan   Diagnoses and all orders for this visit:    1. PAF (paroxysmal atrial fibrillation) (Primary)    2. Mixed hyperlipidemia    3. Primary hypertension    4. Vitamin D deficiency    5. B12 deficiency    6. Chronic midline low back pain without sciatica  -     triamcinolone acetonide (KENALOG-40) injection 80 mg    7. Chronic idiopathic constipation  -     linaclotide (Linzess) 145 MCG capsule capsule; Take 1 capsule by mouth Every Morning Before Breakfast. On empty stomach  Dispense: 30 capsule; Refill: 5           Assessment & Plan  1. Hypertension.  - Blood pressure  recorded at 120/85.  - No complaints of belly pain or bleeding.  - Review of records indicates stable blood pressure management.    2. Proximal atrial fibrillation.  - Recent hospitalization from 05/18/2025 to 05/21/2025 due to chest tightness, neck pain, and head fullness, with a diagnosis of A-fib with RVR.  - Echocardiogram on 05/18/2025 showed mild concentric left ventricular hypertrophy, grade 1 diastolic dysfunction, ejection fraction 55-60%, mild mitral regurgitation, mild to moderate tricuspid regurgitation, and RSV  around 40 mmHg.  - Discharge notes indicate initiation of Eliquis b.i.d., Coreg, and amiodarone.  - Prescription for Eliquis confirmed and covered.    3. Back pain.  - Reports persistent fatigue and severe back pain, significantly impairing daily activities such as standing, combing hair, or brushing teeth.  - Prescription pain patches will be provided to manage symptoms.  - Kenalog 80 injection will be administered today to help alleviate back pain.  -gave samples of lidocaine/menthol ointment and extra strength tylenol     4. Constipation.  - Has tried various over-the-counter stool softeners, including MiraLAX daily and milk of magnesia, with some relief.  - Prescription for Linzess issued to manage constipation.  5. -HLD - continue statin, recheck lipids next ov  6. Next ov check b12 and vitamin D as will be due.  7. Current outpatient and discharge medications have been reconciled for the patient.  Reviewed by: Mark Hernández DO      Follow-up  - Repeat blood work scheduled in 3 months.  - Next follow-up appointment in 3 months.    BMI is within normal parameters. No other follow-up for BMI required.     -Follow up:     ________________________________________  Mark Hernández DO, MS    Current Outpatient Medications on File Prior to Visit   Medication Sig Dispense Refill    acetaminophen (TYLENOL) 325 MG tablet Take 2 tablets by mouth Every 4 (Four) Hours As Needed for Mild Pain.       amiodarone (PACERONE) 200 MG tablet Take 1 tablet by mouth Daily.      amLODIPine (NORVASC) 10 MG tablet Take 1 tablet by mouth Daily.      apixaban (ELIQUIS) 2.5 MG tablet tablet Take 1 tablet by mouth Every 12 (Twelve) Hours.      aspirin 81 MG EC tablet Take 1 tablet by mouth Daily.      carvedilol (COREG) 12.5 MG tablet Take 1 tablet by mouth 2 (Two) Times a Day With Meals.      Diclofenac Epolamine (FLECTOR) 1.3 % patch patch Apply 1 patch topically to the appropriate area as directed 2 (Two) Times a Day. To back 60 patch 5    Diclofenac Sodium (VOLTAREN) 1 % gel gel Apply 4 g topically to the appropriate area as directed 4 (Four) Times a Day As Needed (knee/shoulder pain). 150 g 5    DULoxetine (CYMBALTA) 30 MG capsule Take 1 po daily with 60mg capsule 90 capsule 1    DULoxetine (CYMBALTA) 60 MG capsule Take 60mg + 30mg cap 1 each together PO daily 90 capsule 1    lisinopril (PRINIVIL,ZESTRIL) 10 MG tablet Take 1 tablet by mouth Daily.      Multiple Vitamins-Minerals (PRESERVISION AREDS PO) Take 1 tablet by mouth Daily. Indications: Vitamin Deficiency      rosuvastatin (CRESTOR) 20 MG tablet Take 1 tablet by mouth Every Night. 90 tablet 0    albuterol sulfate  (90 Base) MCG/ACT inhaler Inhale 2 puffs Every 4 (Four) Hours As Needed for Wheezing. (Patient not taking: Reported on 6/2/2025) 18 g 12    [DISCONTINUED] amLODIPine (NORVASC) 5 MG tablet Take 1 tablet by mouth Daily. (Patient not taking: Reported on 6/2/2025) 30 tablet 5    [DISCONTINUED] atenolol (TENORMIN) 25 MG tablet Take 1 tablet by mouth 2 (Two) Times a Day. (Patient not taking: Reported on 6/2/2025)      [DISCONTINUED] carvedilol (COREG) 25 MG tablet TAKE 1 TABLET BY MOUTH TWICE DAILY WITH MEALS FOR HIGH BLOOD PRESSURE (Patient not taking: Reported on 6/2/2025) 180 tablet 0    [DISCONTINUED] lisinopril (PRINIVIL,ZESTRIL) 40 MG tablet Take 1 tablet by mouth Daily. Indications: High Blood Pressure Disorder (Patient not taking: Reported  on 6/2/2025) 90 tablet 3    [DISCONTINUED] meclizine (ANTIVERT) 25 MG tablet Take 1 tablet by mouth 3 (Three) Times a Day As Needed for Dizziness. (Patient not taking: Reported on 6/2/2025) 45 tablet 1    [DISCONTINUED] rivaroxaban (Xarelto) 20 MG tablet Take 1 tablet by mouth Daily. 30 tablet 5     No current facility-administered medications on file prior to visit.

## 2025-06-10 ENCOUNTER — TELEPHONE (OUTPATIENT)
Dept: FAMILY MEDICINE CLINIC | Facility: CLINIC | Age: 80
End: 2025-06-10
Payer: MEDICARE

## 2025-06-10 NOTE — TELEPHONE ENCOUNTER
Caller: Gita Avelar    Relationship: Self    Best call back number: [phone]     What medication are you requesting: ANTIBIOTIC    What are your current symptoms: DIVERTICULITIS    How long have you been experiencing symptoms: 1 FULL DAY     Have you had these symptoms before:    [x] Yes  [] No    Have you been treated for these symptoms before:   [x] Yes  [] No    If a prescription is needed, what is your preferred pharmacy and phone number: [facility] [address] [place] [address] [facility] [phone] [facility] [phone]      Additional notes: PATIENT STATES THAT SHE HAS A HISTORY OF DIVERTICULITIS AND WHEN HER SYMPTOMS ARISE SHE NEEDS AN ANTIBIOTIC.     PLEASE CALL PATIENT WITH ANY QUESTIONS OR CONCERNS.

## 2025-06-10 NOTE — TELEPHONE ENCOUNTER
I sent in antibiotic for her.  If pain gets bad, has rectal bleeding and/or high fevers go to ED.

## 2025-06-18 ENCOUNTER — TELEPHONE (OUTPATIENT)
Dept: FAMILY MEDICINE CLINIC | Facility: CLINIC | Age: 80
End: 2025-06-18

## 2025-06-18 DIAGNOSIS — F33.1 MODERATE EPISODE OF RECURRENT MAJOR DEPRESSIVE DISORDER: Primary | ICD-10-CM

## 2025-06-18 RX ORDER — DULOXETIN HYDROCHLORIDE 60 MG/1
60 CAPSULE, DELAYED RELEASE ORAL 2 TIMES DAILY
Qty: 180 CAPSULE | Refills: 1 | Status: SHIPPED | OUTPATIENT
Start: 2025-06-18

## 2025-06-18 NOTE — TELEPHONE ENCOUNTER
Caller: ROMANA    Relationship: [employer]IIG[employer]    Best call back number: [phone]     What is the best time to reach you: ANY     Who are you requesting to speak with (clinical staff, provider,  specific staff member): CLINICAL STAFF     Do you know the name of the person who called: ROMANA    What was the call regarding: CALLER IS REQUESTING FOR THE PATIENT TO HAVE HER DOSAGE FOR HER CYMBALTA HIGHER . PLEASE CALL TO DISCUSS    Is it okay if the provider responds through MyChart:

## 2025-06-22 DIAGNOSIS — F33.1 MODERATE EPISODE OF RECURRENT MAJOR DEPRESSIVE DISORDER: ICD-10-CM

## 2025-06-22 RX ORDER — DULOXETIN HYDROCHLORIDE 30 MG/1
CAPSULE, DELAYED RELEASE ORAL
Qty: 90 CAPSULE | Refills: 0 | OUTPATIENT
Start: 2025-06-22

## 2025-06-25 DIAGNOSIS — F33.1 MODERATE EPISODE OF RECURRENT MAJOR DEPRESSIVE DISORDER: ICD-10-CM

## 2025-06-26 RX ORDER — AMIODARONE HYDROCHLORIDE 200 MG/1
200 TABLET ORAL DAILY
Qty: 90 TABLET | Refills: 1 | Status: SHIPPED | OUTPATIENT
Start: 2025-06-26

## 2025-06-26 RX ORDER — DULOXETIN HYDROCHLORIDE 30 MG/1
CAPSULE, DELAYED RELEASE ORAL
Qty: 90 CAPSULE | Refills: 0 | OUTPATIENT
Start: 2025-06-26

## 2025-06-26 RX ORDER — CARVEDILOL 12.5 MG/1
12.5 TABLET ORAL 2 TIMES DAILY WITH MEALS
Qty: 180 TABLET | Refills: 1 | Status: SHIPPED | OUTPATIENT
Start: 2025-06-26

## 2025-06-29 DIAGNOSIS — F33.1 MODERATE EPISODE OF RECURRENT MAJOR DEPRESSIVE DISORDER: ICD-10-CM

## 2025-06-29 RX ORDER — DULOXETIN HYDROCHLORIDE 30 MG/1
CAPSULE, DELAYED RELEASE ORAL
Qty: 90 CAPSULE | Refills: 0 | OUTPATIENT
Start: 2025-06-29

## 2025-07-06 DIAGNOSIS — F33.1 MODERATE EPISODE OF RECURRENT MAJOR DEPRESSIVE DISORDER: ICD-10-CM

## 2025-07-06 RX ORDER — DULOXETIN HYDROCHLORIDE 30 MG/1
CAPSULE, DELAYED RELEASE ORAL
Qty: 90 CAPSULE | Refills: 0 | OUTPATIENT
Start: 2025-07-06

## 2025-07-07 ENCOUNTER — TELEPHONE (OUTPATIENT)
Dept: FAMILY MEDICINE CLINIC | Facility: CLINIC | Age: 80
End: 2025-07-07

## 2025-07-07 ENCOUNTER — TELEPHONE (OUTPATIENT)
Dept: FAMILY MEDICINE CLINIC | Facility: CLINIC | Age: 80
End: 2025-07-07
Payer: MEDICARE

## 2025-07-07 DIAGNOSIS — F33.1 MODERATE EPISODE OF RECURRENT MAJOR DEPRESSIVE DISORDER: ICD-10-CM

## 2025-07-07 RX ORDER — LISINOPRIL 10 MG/1
10 TABLET ORAL DAILY
Qty: 90 TABLET | Refills: 1 | Status: SHIPPED | OUTPATIENT
Start: 2025-07-07

## 2025-07-07 RX ORDER — DULOXETIN HYDROCHLORIDE 60 MG/1
60 CAPSULE, DELAYED RELEASE ORAL 2 TIMES DAILY
Qty: 180 CAPSULE | Refills: 1 | Status: SHIPPED | OUTPATIENT
Start: 2025-07-07

## 2025-07-07 NOTE — TELEPHONE ENCOUNTER
Caller: Gita Avelar    Relationship: Self    Best call back number: [phone]    Requested Prescriptions:   Requested Prescriptions     Pending Prescriptions Disp Refills    DULoxetine (CYMBALTA) 60 MG capsule 180 capsule 1     Sig: Take 1 capsule by mouth 2 (Two) Times a Day.    lisinopril (PRINIVIL,ZESTRIL) 10 MG tablet       Sig: Take 1 tablet by mouth Daily.        Pharmacy where request should be sent: [address] [phone] [facility] [phone]      Last office visit with prescribing clinician: 6/2/2025   Last telemedicine visit with prescribing clinician: Visit date not found   Next office visit with prescribing clinician: 9/4/2025     Additional details provided by patient:   6 DAYS FOR LISINOPRIL AND OUT OF DULOXETINE.  Does the patient have less than a 3 day supply:  [] Yes  [x] No    Would you like a call back once the refill request has been completed: [x] Yes [] No    If the office needs to give you a call back, can they leave a voicemail: [x] Yes [] No    Holger Johnson   07/07/25 09:20 EDT

## 2025-07-07 NOTE — TELEPHONE ENCOUNTER
Caller: Gita Avelar    Relationship: Self    Best call back number: [phone]    What medication are you requesting: ALTERNATIVE TO apixaban (ELIQUIS) 2.5 MG tablet tablet     AND AN ANXIETY MEDICATION     What are your current symptoms:   STATES IS TOO EXPENSIVE.     How long have you been experiencing symptoms: N/A    Have you had these symptoms before:    [x] Yes  [] No    Have you been treated for these symptoms before:   [x] Yes  [] No    If a prescription is needed, what is your preferred pharmacy and phone number: [facility] [address] [place] [id number] [facility] [phone] [facility] [phone]      Additional notes:      PLEASE CALL TO CONFIRM OR DENY.

## 2025-07-28 ENCOUNTER — READMISSION MANAGEMENT (OUTPATIENT)
Dept: CALL CENTER | Facility: HOSPITAL | Age: 80
End: 2025-07-28
Payer: MEDICARE

## 2025-07-28 NOTE — OUTREACH NOTE
Prep Survey      Flowsheet Row Responses   [facility] facility patient discharged from? Non-BH   Is LACE score < 7 ? Non-BH Discharge   Eligibility Not Eligible   What are the reasons patient is not eligible? Other  [no recent external discharge noted]   Does the patient have one of the following disease processes/diagnoses(primary or secondary)? Other   Prep survey completed? Yes            Leida Greer Registered Nurse

## (undated) DEVICE — CVR PROB GEN PURP W ISOSILK 6X48

## (undated) DEVICE — DRSNG WND GZ PAD BORDERED 4X8IN STRL

## (undated) DEVICE — INTENDED FOR TISSUE SEPARATION, AND OTHER PROCEDURES THAT REQUIRE A SHARP SURGICAL BLADE TO PUNCTURE OR CUT.: Brand: BARD-PARKER ® CARBON RIB-BACK BLADES

## (undated) DEVICE — 3M™ STERI-STRIP™ REINFORCED ADHESIVE SKIN CLOSURES, R1547, 1/2 IN X 4 IN (12 MM X 100 MM), 6 STRIPS/ENVELOPE: Brand: 3M™ STERI-STRIP™

## (undated) DEVICE — SUT VIC 2/0 CT1 36IN

## (undated) DEVICE — GLV SURG BIOGEL LTX PF 7

## (undated) DEVICE — SUT VIC 3/0 SH CR8 18IN J864D

## (undated) DEVICE — PK UNIV COMPL 40

## (undated) DEVICE — BNDG ELAS ELITE V/CLOSE 6IN 5YD LF STRL

## (undated) DEVICE — SUT MNCRYL 4/0 PS2 18 IN

## (undated) DEVICE — SKIN PREP TRAY W/CHG: Brand: MEDLINE INDUSTRIES, INC.